# Patient Record
Sex: MALE | Race: BLACK OR AFRICAN AMERICAN | NOT HISPANIC OR LATINO | Employment: UNEMPLOYED | URBAN - METROPOLITAN AREA
[De-identification: names, ages, dates, MRNs, and addresses within clinical notes are randomized per-mention and may not be internally consistent; named-entity substitution may affect disease eponyms.]

---

## 2020-01-06 ENCOUNTER — APPOINTMENT (OUTPATIENT)
Dept: NON INVASIVE DIAGNOSTICS | Facility: HOSPITAL | Age: 55
DRG: 174 | End: 2020-01-06
Payer: COMMERCIAL

## 2020-01-06 ENCOUNTER — APPOINTMENT (EMERGENCY)
Dept: RADIOLOGY | Facility: HOSPITAL | Age: 55
DRG: 174 | End: 2020-01-06
Payer: COMMERCIAL

## 2020-01-06 ENCOUNTER — HOSPITAL ENCOUNTER (INPATIENT)
Facility: HOSPITAL | Age: 55
LOS: 1 days | Discharge: HOME | DRG: 174 | End: 2020-01-07
Attending: INTERNAL MEDICINE | Admitting: INTERNAL MEDICINE
Payer: COMMERCIAL

## 2020-01-06 DIAGNOSIS — I21.02 ACUTE ST ELEVATION MYOCARDIAL INFARCTION (STEMI) INVOLVING LEFT ANTERIOR DESCENDING (LAD) CORONARY ARTERY (HCC): Primary | ICD-10-CM

## 2020-01-06 DIAGNOSIS — I21.3 STEMI (ST ELEVATION MYOCARDIAL INFARCTION) (HCC): ICD-10-CM

## 2020-01-06 PROBLEM — E11.9 DIABETES (HCC): Status: ACTIVE | Noted: 2020-01-06

## 2020-01-06 LAB
ALBUMIN SERPL BCP-MCNC: 4.6 G/DL (ref 3.5–5)
ALP SERPL-CCNC: 82 U/L (ref 46–116)
ALT SERPL W P-5'-P-CCNC: 41 U/L (ref 12–78)
ANION GAP BLD CALC-SCNC: 16 MMOL/L (ref 4–13)
ANION GAP SERPL CALCULATED.3IONS-SCNC: 11 MMOL/L (ref 4–13)
APTT PPP: 125 SECONDS (ref 23–37)
AST SERPL W P-5'-P-CCNC: 36 U/L (ref 5–45)
ATRIAL RATE: 72 BPM
ATRIAL RATE: 78 BPM
ATRIAL RATE: 88 BPM
BASOPHILS # BLD AUTO: 0.05 THOUSANDS/ΜL (ref 0–0.1)
BASOPHILS NFR BLD AUTO: 1 % (ref 0–1)
BILIRUB SERPL-MCNC: 0.42 MG/DL (ref 0.2–1)
BUN BLD-MCNC: 21 MG/DL (ref 5–25)
BUN SERPL-MCNC: 20 MG/DL (ref 5–25)
CA-I BLD-SCNC: 1.08 MMOL/L (ref 1.12–1.32)
CALCIUM SERPL-MCNC: 9.4 MG/DL (ref 8.3–10.1)
CHLORIDE BLD-SCNC: 105 MMOL/L (ref 100–108)
CHLORIDE SERPL-SCNC: 103 MMOL/L (ref 100–108)
CO2 SERPL-SCNC: 28 MMOL/L (ref 21–32)
CREAT BLD-MCNC: 1.2 MG/DL (ref 0.6–1.3)
CREAT SERPL-MCNC: 1.33 MG/DL (ref 0.6–1.3)
EOSINOPHIL # BLD AUTO: 0.04 THOUSAND/ΜL (ref 0–0.61)
EOSINOPHIL NFR BLD AUTO: 0 % (ref 0–6)
ERYTHROCYTE [DISTWIDTH] IN BLOOD BY AUTOMATED COUNT: 14.4 % (ref 11.6–15.1)
GFR SERPL CREATININE-BSD FRML MDRD: 69 ML/MIN/1.73SQ M
GFR SERPL CREATININE-BSD FRML MDRD: 70 ML/MIN/1.73SQ M
GLUCOSE SERPL-MCNC: 101 MG/DL (ref 65–140)
GLUCOSE SERPL-MCNC: 105 MG/DL (ref 65–140)
GLUCOSE SERPL-MCNC: 74 MG/DL (ref 65–140)
GLUCOSE SERPL-MCNC: 75 MG/DL (ref 65–140)
GLUCOSE SERPL-MCNC: 79 MG/DL (ref 65–140)
GLUCOSE SERPL-MCNC: 91 MG/DL (ref 65–140)
HCT VFR BLD AUTO: 47.6 % (ref 36.5–49.3)
HCT VFR BLD CALC: 48 % (ref 36.5–49.3)
HGB BLD-MCNC: 15.6 G/DL (ref 12–17)
HGB BLDA-MCNC: 16.3 G/DL (ref 12–17)
IMM GRANULOCYTES # BLD AUTO: 0.04 THOUSAND/UL (ref 0–0.2)
IMM GRANULOCYTES NFR BLD AUTO: 0 % (ref 0–2)
INR PPP: 1.1 (ref 0.84–1.19)
KCT BLD-ACNC: 212 SEC (ref 89–137)
LYMPHOCYTES # BLD AUTO: 3.51 THOUSANDS/ΜL (ref 0.6–4.47)
LYMPHOCYTES NFR BLD AUTO: 34 % (ref 14–44)
MCH RBC QN AUTO: 29.9 PG (ref 26.8–34.3)
MCHC RBC AUTO-ENTMCNC: 32.8 G/DL (ref 31.4–37.4)
MCV RBC AUTO: 91 FL (ref 82–98)
MONOCYTES # BLD AUTO: 0.65 THOUSAND/ΜL (ref 0.17–1.22)
MONOCYTES NFR BLD AUTO: 6 % (ref 4–12)
NEUTROPHILS # BLD AUTO: 6.12 THOUSANDS/ΜL (ref 1.85–7.62)
NEUTS SEG NFR BLD AUTO: 59 % (ref 43–75)
NRBC BLD AUTO-RTO: 0 /100 WBCS
P AXIS: 71 DEGREES
P AXIS: 77 DEGREES
P AXIS: 79 DEGREES
PCO2 BLD: 26 MMOL/L (ref 21–32)
PLATELET # BLD AUTO: 202 THOUSANDS/UL (ref 149–390)
PMV BLD AUTO: 11.3 FL (ref 8.9–12.7)
POTASSIUM BLD-SCNC: 4.2 MMOL/L (ref 3.5–5.3)
POTASSIUM SERPL-SCNC: 4.1 MMOL/L (ref 3.5–5.3)
PR INTERVAL: 146 MS
PR INTERVAL: 148 MS
PR INTERVAL: 162 MS
PROT SERPL-MCNC: 8.1 G/DL (ref 6.4–8.2)
PROTHROMBIN TIME: 13.6 SECONDS (ref 11.6–14.5)
QRS AXIS: 57 DEGREES
QRS AXIS: 61 DEGREES
QRS AXIS: 73 DEGREES
QRSD INTERVAL: 74 MS
QRSD INTERVAL: 76 MS
QRSD INTERVAL: 82 MS
QT INTERVAL: 354 MS
QT INTERVAL: 360 MS
QT INTERVAL: 364 MS
QTC INTERVAL: 398 MS
QTC INTERVAL: 403 MS
QTC INTERVAL: 435 MS
RBC # BLD AUTO: 5.22 MILLION/UL (ref 3.88–5.62)
SODIUM BLD-SCNC: 142 MMOL/L (ref 136–145)
SODIUM SERPL-SCNC: 142 MMOL/L (ref 136–145)
SPECIMEN SOURCE: ABNORMAL
SPECIMEN SOURCE: ABNORMAL
SPECIMEN SOURCE: NORMAL
T WAVE AXIS: 32 DEGREES
T WAVE AXIS: 36 DEGREES
T WAVE AXIS: 39 DEGREES
TROPONIN I BLD-MCNC: 0.07 NG/ML (ref 0–0.08)
TROPONIN I SERPL-MCNC: 0.12 NG/ML
VENTRICULAR RATE: 72 BPM
VENTRICULAR RATE: 78 BPM
VENTRICULAR RATE: 88 BPM
WBC # BLD AUTO: 10.41 THOUSAND/UL (ref 4.31–10.16)

## 2020-01-06 PROCEDURE — C1887 CATHETER, GUIDING: HCPCS | Performed by: NURSE PRACTITIONER

## 2020-01-06 PROCEDURE — 36415 COLL VENOUS BLD VENIPUNCTURE: CPT | Performed by: EMERGENCY MEDICINE

## 2020-01-06 PROCEDURE — 93010 ELECTROCARDIOGRAM REPORT: CPT | Performed by: INTERNAL MEDICINE

## 2020-01-06 PROCEDURE — 99152 MOD SED SAME PHYS/QHP 5/>YRS: CPT | Performed by: NURSE PRACTITIONER

## 2020-01-06 PROCEDURE — C1894 INTRO/SHEATH, NON-LASER: HCPCS | Performed by: NURSE PRACTITIONER

## 2020-01-06 PROCEDURE — 93005 ELECTROCARDIOGRAM TRACING: CPT

## 2020-01-06 PROCEDURE — 84484 ASSAY OF TROPONIN QUANT: CPT | Performed by: EMERGENCY MEDICINE

## 2020-01-06 PROCEDURE — 85610 PROTHROMBIN TIME: CPT | Performed by: EMERGENCY MEDICINE

## 2020-01-06 PROCEDURE — 92941 PRQ TRLML REVSC TOT OCCL AMI: CPT | Performed by: INTERNAL MEDICINE

## 2020-01-06 PROCEDURE — C1769 GUIDE WIRE: HCPCS | Performed by: NURSE PRACTITIONER

## 2020-01-06 PROCEDURE — 027035Z DILATION OF CORONARY ARTERY, ONE ARTERY WITH TWO DRUG-ELUTING INTRALUMINAL DEVICES, PERCUTANEOUS APPROACH: ICD-10-PCS | Performed by: INTERNAL MEDICINE

## 2020-01-06 PROCEDURE — 84484 ASSAY OF TROPONIN QUANT: CPT

## 2020-01-06 PROCEDURE — 80053 COMPREHEN METABOLIC PANEL: CPT | Performed by: EMERGENCY MEDICINE

## 2020-01-06 PROCEDURE — 99153 MOD SED SAME PHYS/QHP EA: CPT | Performed by: NURSE PRACTITIONER

## 2020-01-06 PROCEDURE — 85025 COMPLETE CBC W/AUTO DIFF WBC: CPT | Performed by: EMERGENCY MEDICINE

## 2020-01-06 PROCEDURE — 99152 MOD SED SAME PHYS/QHP 5/>YRS: CPT | Performed by: INTERNAL MEDICINE

## 2020-01-06 PROCEDURE — B240ZZ3 ULTRASONOGRAPHY OF SINGLE CORONARY ARTERY, INTRAVASCULAR: ICD-10-PCS | Performed by: INTERNAL MEDICINE

## 2020-01-06 PROCEDURE — 4A023N7 MEASUREMENT OF CARDIAC SAMPLING AND PRESSURE, LEFT HEART, PERCUTANEOUS APPROACH: ICD-10-PCS | Performed by: INTERNAL MEDICINE

## 2020-01-06 PROCEDURE — 80047 BASIC METABLC PNL IONIZED CA: CPT

## 2020-01-06 PROCEDURE — NC001 PR NO CHARGE: Performed by: INTERNAL MEDICINE

## 2020-01-06 PROCEDURE — 85014 HEMATOCRIT: CPT

## 2020-01-06 PROCEDURE — 93306 TTE W/DOPPLER COMPLETE: CPT | Performed by: INTERNAL MEDICINE

## 2020-01-06 PROCEDURE — 93458 L HRT ARTERY/VENTRICLE ANGIO: CPT | Performed by: INTERNAL MEDICINE

## 2020-01-06 PROCEDURE — 99285 EMERGENCY DEPT VISIT HI MDM: CPT | Performed by: EMERGENCY MEDICINE

## 2020-01-06 PROCEDURE — B2111ZZ FLUOROSCOPY OF MULTIPLE CORONARY ARTERIES USING LOW OSMOLAR CONTRAST: ICD-10-PCS | Performed by: INTERNAL MEDICINE

## 2020-01-06 PROCEDURE — 92978 ENDOLUMINL IVUS OCT C 1ST: CPT | Performed by: INTERNAL MEDICINE

## 2020-01-06 PROCEDURE — 93458 L HRT ARTERY/VENTRICLE ANGIO: CPT | Performed by: NURSE PRACTITIONER

## 2020-01-06 PROCEDURE — 96374 THER/PROPH/DIAG INJ IV PUSH: CPT

## 2020-01-06 PROCEDURE — 85347 COAGULATION TIME ACTIVATED: CPT

## 2020-01-06 PROCEDURE — 85730 THROMBOPLASTIN TIME PARTIAL: CPT | Performed by: EMERGENCY MEDICINE

## 2020-01-06 PROCEDURE — C1874 STENT, COATED/COV W/DEL SYS: HCPCS

## 2020-01-06 PROCEDURE — 93306 TTE W/DOPPLER COMPLETE: CPT

## 2020-01-06 PROCEDURE — 82948 REAGENT STRIP/BLOOD GLUCOSE: CPT

## 2020-01-06 PROCEDURE — C1725 CATH, TRANSLUMIN NON-LASER: HCPCS | Performed by: NURSE PRACTITIONER

## 2020-01-06 PROCEDURE — 96375 TX/PRO/DX INJ NEW DRUG ADDON: CPT

## 2020-01-06 PROCEDURE — C9606 PERC D-E COR REVASC W AMI S: HCPCS | Performed by: NURSE PRACTITIONER

## 2020-01-06 PROCEDURE — C1753 CATH, INTRAVAS ULTRASOUND: HCPCS | Performed by: NURSE PRACTITIONER

## 2020-01-06 PROCEDURE — 92978 ENDOLUMINL IVUS OCT C 1ST: CPT | Performed by: NURSE PRACTITIONER

## 2020-01-06 PROCEDURE — 99285 EMERGENCY DEPT VISIT HI MDM: CPT

## 2020-01-06 PROCEDURE — 71045 X-RAY EXAM CHEST 1 VIEW: CPT

## 2020-01-06 RX ORDER — MIDAZOLAM HYDROCHLORIDE 2 MG/2ML
INJECTION, SOLUTION INTRAMUSCULAR; INTRAVENOUS CODE/TRAUMA/SEDATION MEDICATION
Status: COMPLETED | OUTPATIENT
Start: 2020-01-06 | End: 2020-01-06

## 2020-01-06 RX ORDER — ATORVASTATIN CALCIUM 40 MG/1
80 TABLET, FILM COATED ORAL
Status: DISCONTINUED | OUTPATIENT
Start: 2020-01-06 | End: 2020-01-07 | Stop reason: HOSPADM

## 2020-01-06 RX ORDER — VERAPAMIL HCL 2.5 MG/ML
AMPUL (ML) INTRAVENOUS CODE/TRAUMA/SEDATION MEDICATION
Status: COMPLETED | OUTPATIENT
Start: 2020-01-06 | End: 2020-01-06

## 2020-01-06 RX ORDER — ASPIRIN 81 MG/1
81 TABLET, CHEWABLE ORAL DAILY
Status: DISCONTINUED | OUTPATIENT
Start: 2020-01-07 | End: 2020-01-07 | Stop reason: HOSPADM

## 2020-01-06 RX ORDER — ONDANSETRON 2 MG/ML
4 INJECTION INTRAMUSCULAR; INTRAVENOUS EVERY 6 HOURS PRN
Status: DISCONTINUED | OUTPATIENT
Start: 2020-01-06 | End: 2020-01-07 | Stop reason: HOSPADM

## 2020-01-06 RX ORDER — ACETAMINOPHEN 325 MG/1
650 TABLET ORAL EVERY 4 HOURS PRN
Status: DISCONTINUED | OUTPATIENT
Start: 2020-01-06 | End: 2020-01-07 | Stop reason: HOSPADM

## 2020-01-06 RX ORDER — NITROGLYCERIN 20 MG/100ML
INJECTION INTRAVENOUS CODE/TRAUMA/SEDATION MEDICATION
Status: COMPLETED | OUTPATIENT
Start: 2020-01-06 | End: 2020-01-06

## 2020-01-06 RX ORDER — NITROGLYCERIN 0.4 MG/1
0.4 TABLET SUBLINGUAL
Status: DISCONTINUED | OUTPATIENT
Start: 2020-01-06 | End: 2020-01-07 | Stop reason: HOSPADM

## 2020-01-06 RX ORDER — FENTANYL CITRATE 50 UG/ML
50 INJECTION, SOLUTION INTRAMUSCULAR; INTRAVENOUS ONCE
Status: COMPLETED | OUTPATIENT
Start: 2020-01-06 | End: 2020-01-06

## 2020-01-06 RX ORDER — FUROSEMIDE 10 MG/ML
INJECTION INTRAMUSCULAR; INTRAVENOUS CODE/TRAUMA/SEDATION MEDICATION
Status: COMPLETED | OUTPATIENT
Start: 2020-01-06 | End: 2020-01-06

## 2020-01-06 RX ORDER — FENTANYL CITRATE 50 UG/ML
INJECTION, SOLUTION INTRAMUSCULAR; INTRAVENOUS CODE/TRAUMA/SEDATION MEDICATION
Status: COMPLETED | OUTPATIENT
Start: 2020-01-06 | End: 2020-01-06

## 2020-01-06 RX ORDER — INSULIN ASPART 100 [IU]/ML
20 INJECTION, SUSPENSION SUBCUTANEOUS
Status: DISCONTINUED | OUTPATIENT
Start: 2020-01-06 | End: 2020-01-07 | Stop reason: HOSPADM

## 2020-01-06 RX ORDER — LIDOCAINE HYDROCHLORIDE 10 MG/ML
INJECTION, SOLUTION EPIDURAL; INFILTRATION; INTRACAUDAL; PERINEURAL CODE/TRAUMA/SEDATION MEDICATION
Status: COMPLETED | OUTPATIENT
Start: 2020-01-06 | End: 2020-01-06

## 2020-01-06 RX ORDER — HEPARIN SODIUM 5000 [USP'U]/ML
5000 INJECTION, SOLUTION INTRAVENOUS; SUBCUTANEOUS EVERY 8 HOURS SCHEDULED
Status: DISCONTINUED | OUTPATIENT
Start: 2020-01-06 | End: 2020-01-07 | Stop reason: HOSPADM

## 2020-01-06 RX ORDER — HEPARIN SODIUM 1000 [USP'U]/ML
INJECTION, SOLUTION INTRAVENOUS; SUBCUTANEOUS CODE/TRAUMA/SEDATION MEDICATION
Status: COMPLETED | OUTPATIENT
Start: 2020-01-06 | End: 2020-01-06

## 2020-01-06 RX ORDER — HEPARIN SODIUM 1000 [USP'U]/ML
4000 INJECTION, SOLUTION INTRAVENOUS; SUBCUTANEOUS ONCE
Status: COMPLETED | OUTPATIENT
Start: 2020-01-06 | End: 2020-01-06

## 2020-01-06 RX ORDER — SODIUM CHLORIDE 9 MG/ML
75 INJECTION, SOLUTION INTRAVENOUS CONTINUOUS
Status: DISPENSED | OUTPATIENT
Start: 2020-01-06 | End: 2020-01-06

## 2020-01-06 RX ADMIN — ATORVASTATIN CALCIUM 80 MG: 40 TABLET, FILM COATED ORAL at 16:31

## 2020-01-06 RX ADMIN — FENTANYL CITRATE 50 MCG: 50 INJECTION, SOLUTION INTRAMUSCULAR; INTRAVENOUS at 11:20

## 2020-01-06 RX ADMIN — HEPARIN SODIUM 4000 UNITS: 1000 INJECTION INTRAVENOUS; SUBCUTANEOUS at 11:43

## 2020-01-06 RX ADMIN — HEPARIN SODIUM 5000 UNITS: 5000 INJECTION INTRAVENOUS; SUBCUTANEOUS at 21:08

## 2020-01-06 RX ADMIN — INSULIN ASPART 20 UNITS: 100 INJECTION, SUSPENSION SUBCUTANEOUS at 15:26

## 2020-01-06 RX ADMIN — LIDOCAINE HYDROCHLORIDE 0.1 ML: 10 INJECTION, SOLUTION EPIDURAL; INFILTRATION; INTRACAUDAL; PERINEURAL at 11:40

## 2020-01-06 RX ADMIN — VERAPAMIL HYDROCHLORIDE 1.25 MG: 2.5 INJECTION, SOLUTION INTRAVENOUS at 11:43

## 2020-01-06 RX ADMIN — HEPARIN SODIUM 5000 UNITS: 5000 INJECTION INTRAVENOUS; SUBCUTANEOUS at 15:26

## 2020-01-06 RX ADMIN — FUROSEMIDE 20 MG: 10 INJECTION, SOLUTION INTRAMUSCULAR; INTRAVENOUS at 12:41

## 2020-01-06 RX ADMIN — NITROGLYCERIN 200 MCG: 20 INJECTION INTRAVENOUS at 11:43

## 2020-01-06 RX ADMIN — FENTANYL CITRATE 25 MCG: 50 INJECTION, SOLUTION INTRAMUSCULAR; INTRAVENOUS at 12:11

## 2020-01-06 RX ADMIN — MIDAZOLAM HYDROCHLORIDE 1 MG: 1 INJECTION, SOLUTION INTRAMUSCULAR; INTRAVENOUS at 12:00

## 2020-01-06 RX ADMIN — HEPARIN SODIUM 4000 UNITS: 1000 INJECTION INTRAVENOUS; SUBCUTANEOUS at 11:08

## 2020-01-06 RX ADMIN — IOHEXOL 170 ML: 350 INJECTION, SOLUTION INTRAVENOUS at 12:29

## 2020-01-06 RX ADMIN — MIDAZOLAM HYDROCHLORIDE 1 MG: 1 INJECTION, SOLUTION INTRAMUSCULAR; INTRAVENOUS at 12:11

## 2020-01-06 RX ADMIN — MIDAZOLAM HYDROCHLORIDE 2 MG: 1 INJECTION, SOLUTION INTRAMUSCULAR; INTRAVENOUS at 11:36

## 2020-01-06 RX ADMIN — METOPROLOL TARTRATE 25 MG: 25 TABLET ORAL at 21:09

## 2020-01-06 RX ADMIN — FENTANYL CITRATE 50 MCG: 50 INJECTION, SOLUTION INTRAMUSCULAR; INTRAVENOUS at 11:09

## 2020-01-06 RX ADMIN — SODIUM CHLORIDE 75 ML/HR: 0.9 INJECTION, SOLUTION INTRAVENOUS at 13:02

## 2020-01-06 RX ADMIN — HEPARIN SODIUM 4000 UNITS: 1000 INJECTION INTRAVENOUS; SUBCUTANEOUS at 11:55

## 2020-01-06 RX ADMIN — FENTANYL CITRATE 25 MCG: 50 INJECTION, SOLUTION INTRAMUSCULAR; INTRAVENOUS at 11:59

## 2020-01-06 RX ADMIN — FENTANYL CITRATE 50 MCG: 50 INJECTION, SOLUTION INTRAMUSCULAR; INTRAVENOUS at 11:36

## 2020-01-06 RX ADMIN — TICAGRELOR 90 MG: 90 TABLET ORAL at 22:36

## 2020-01-06 RX ADMIN — TICAGRELOR 180 MG: 90 TABLET ORAL at 11:06

## 2020-01-06 NOTE — PLAN OF CARE
Problem: Potential for Falls  Goal: Patient will remain free of falls  Description  INTERVENTIONS:  - Assess patient frequently for physical needs  -  Identify cognitive and physical deficits and behaviors that affect risk of falls    -  [place] fall precautions as indicated by assessment   - Educate patient/family on patient safety including physical limitations  - Instruct patient to call for assistance with activity based on assessment  - Modify environment to reduce risk of injury  - Consider OT/PT consult to assist with strengthening/mobility  Outcome: Progressing     Problem: PAIN - ADULT  Goal: Verbalizes/displays adequate comfort level or baseline comfort level  Description  Interventions:  - Encourage patient to monitor pain and request assistance  - Assess pain using appropriate pain scale  - Administer analgesics based on type and severity of pain and evaluate response  - Implement non-pharmacological measures as appropriate and evaluate response  - Consider cultural and social influences on pain and pain management  - Notify physician/advanced practitioner if interventions unsuccessful or patient reports new pain  Outcome: Progressing

## 2020-01-06 NOTE — SOCIAL WORK
CM was notified by cardiology team that they had escribed Brilinta to [address] to verify coverage and cost     CM received a call from Carolyn Conti who reported that patient will have a $0 co-payment monthly going forward  CM updated Cardiology team and will continue to follow for any additional discharge needs

## 2020-01-06 NOTE — ED PROVIDER NOTES
History  Chief Complaint   Patient presents with    Chest Pain     Patient was at gym, returned home when sudden onset of "crushing chest pain" began       History provided by:  Patient   used: No      Patient is a 41-year-old male presenting to emergency department with chest pain  Started before arrival   Patient went to the gym today morning  Was feeling fine afterwards  Went home  ate breakfast   While at rest start having chest pain  Pressure and squeeze  Called an ambulance  EMS gave aspirin 324  Pre-hospital STEMI alert called based on EMS ECG  Patient was diaphoretic  Heart rate would become bradycardic  No fevers  No vomiting  No tenderness  No history of MI or stents  Diabetic  Smoker  No drug use except for marijuana  No alcohol use  No hypertension hyperlipidemia  EMS ECG consistent with MI with elevations V1 V2 V3 and depressions inferiorly  I did speak with Dr Julito Juan from Interventional Cardiology, he is on his way in to the hospital     Patient given Brilinta, heparin bolus, fentanyl  Repeat fentanyl for pain  ECG in the ER also consistent with STEMI, elevations eval V1 V2 V3, depressions inferiorly  With patient becoming bradycardic, concern for right-sided involvement as well, nitro helped  Prior to Admission Medications   Prescriptions Last Dose Informant Patient Reported? Taking?   insulin aspart protamine-insulin aspart (NovoLOG 70/30) 100 Units/mL injection pen   Yes Yes   Sig: Inject 20 Units under the skin 2 (two) times a day with meals   metFORMIN (GLUCOPHAGE) 1000 MG tablet   Yes Yes   Sig: Take 1,000 mg by mouth 2 (two) times a day with meals      Facility-Administered Medications: None       Past Medical History:   Diagnosis Date    Diabetes mellitus ([facility] [address] )        History reviewed  No pertinent surgical history  History reviewed  No pertinent family history  I have reviewed and agree with the history as documented      Social History     Tobacco Use    Smoking status: Current Every Day Smoker     Types: Cigarettes    Smokeless tobacco: Never Used   Substance Use Topics    Alcohol use: Not Currently    Drug use: Yes     Types: Marijuana        Review of Systems   Constitutional: Positive for diaphoresis  Negative for chills and fever  HENT: Negative for congestion and sore throat  Respiratory: Negative for cough, shortness of breath, wheezing and stridor  Cardiovascular: Positive for chest pain  Negative for palpitations and leg swelling  Gastrointestinal: Negative for abdominal pain, blood in stool, diarrhea, nausea and vomiting  Genitourinary: Negative for dysuria, frequency and urgency  Musculoskeletal: Negative for neck pain and neck stiffness  Skin: Negative for pallor and rash  Neurological: Negative for dizziness, syncope, weakness, light-headedness and headaches  All other systems reviewed and are negative  Physical Exam  Physical Exam   Constitutional: He is oriented to person, place, and time  He appears well-developed and well-nourished  He appears distressed  HENT:   Head: Normocephalic and atraumatic  Eyes: Pupils are equal, round, and reactive to light  Neck: Neck supple  Cardiovascular: Normal rate, regular rhythm, normal heart sounds and intact distal pulses  Pulmonary/Chest: Effort normal and breath sounds normal  No respiratory distress  Abdominal: Soft  Bowel sounds are normal  There is no tenderness  Musculoskeletal: Normal range of motion  He exhibits no edema or deformity  Neurological: He is alert and oriented to person, place, and time  Skin: Skin is warm  Capillary refill takes less than 2 seconds  He is diaphoretic  No erythema  No pallor  Vitals reviewed        Vital Signs  ED Triage Vitals   Temperature Pulse Respirations Blood Pressure SpO2   01/06/20 1248 01/06/20 1109 01/06/20 1248 01/06/20 1105 01/06/20 1109   98 4 °F (36 9 °C) 72 16 145/92 94 %      Temp Source Heart Rate Source Patient Position - Orthostatic VS BP Location FiO2 (%)   01/06/20 1248 01/06/20 1248 01/06/20 1248 01/06/20 1248 --   Oral Monitor Lying Left arm       Pain Score       --                  Vitals:    01/06/20 1830 01/06/20 1856 01/06/20 1900 01/06/20 2000   BP: 128/58 133/63 133/63 140/67   Pulse: 65 65 65 66   Patient Position - Orthostatic VS:  Lying           Visual Acuity  Visual Acuity      Most Recent Value   L Pupil Size (mm)  2   R Pupil Size (mm)  2   L Pupil Shape  Round   R Pupil Shape  Round          ED Medications  Medications   insulin aspart protamine-insulin aspart (NovoLOG 70/30) 100 units/mL subcutaneous injection 20 Units (20 Units Subcutaneous Given 1/6/20 1526)   nicotine (NICODERM CQ) 7 mg/24hr TD 24 hr patch 1 patch (1 patch Transdermal Not Given 1/6/20 1302)   heparin (porcine) subcutaneous injection 5,000 Units (5,000 Units Subcutaneous Given 1/6/20 1526)   acetaminophen (TYLENOL) tablet 650 mg (has no administration in time range)   ondansetron (ZOFRAN) injection 4 mg (has no administration in time range)   aspirin chewable tablet 81 mg (has no administration in time range)   nitroglycerin (NITROSTAT) SL tablet 0 4 mg (has no administration in time range)   insulin lispro (HumaLOG) 100 units/mL subcutaneous injection 1-5 Units (1 Units Subcutaneous Not Given 1/6/20 1529)   insulin lispro (HumaLOG) 100 units/mL subcutaneous injection 1-5 Units (has no administration in time range)   ticagrelor (BRILINTA) tablet 90 mg (has no administration in time range)   sodium chloride 0 9 % infusion (75 mL/hr Intravenous New Bag 1/6/20 1302)   atorvastatin (LIPITOR) tablet 80 mg (80 mg Oral Given 1/6/20 1631)   metoprolol tartrate (LOPRESSOR) tablet 25 mg (has no administration in time range)   fentanyl citrate (PF) 100 MCG/2ML 50 mcg (50 mcg Intravenous Given 1/6/20 1109)   fentanyl citrate (PF) 100 MCG/2ML 50 mcg (50 mcg Intravenous Given 1/6/20 1120)   fentanyl citrate (PF) 100 MCG/2ML (50 mcg Intravenous Given 1/6/20 1136)   midazolam (VERSED) injection (2 mg Intravenous Given 1/6/20 1136)   lidocaine (PF) (XYLOCAINE-MPF) 1 % injection (0 1 mL Infiltration Given 1/6/20 1140)   nitroGLYcerin (TRIDIL) 50 mg in 250 mL infusion (premix) (200 mcg Intra-arterial Given 1/6/20 1143)   verapamil (ISOPTIN) injection (1 25 mg Intra-arterial Given 1/6/20 1143)   heparin (porcine) injection (4,000 Units Intravenous Given 1/6/20 1155)   heparin (porcine) injection 4,000 Units (4,000 Units Intravenous Given 1/6/20 1108)   ticagrelor (BRILINTA) tablet 180 mg (180 mg Oral Given 1/6/20 1106)   fentanyl citrate (PF) 100 MCG/2ML (25 mcg Intravenous Given 1/6/20 1211)   midazolam (VERSED) injection (1 mg Intravenous Given 1/6/20 1211)   iohexol (OMNIPAQUE) 350 MG/ML injection (SINGLE-DOSE) (170 mL Intravenous Given 1/6/20 1229)   furosemide (LASIX) injection (20 mg Intravenous Given 1/6/20 1241)       Diagnostic Studies  Results Reviewed     Procedure Component Value Units Date/Time    Protime-INR [[id number]]  (Normal) Collected:  01/06/20 1118    Lab Status:  Final result Specimen:  Blood from Arm, Right Updated:  01/06/20 1250     Protime 13 6 seconds      INR 1 10    APTT [[id number]]  (Abnormal) Collected:  01/06/20 1118    Lab Status:  Final result Specimen:  Blood from Arm, Right Updated:  01/06/20 1250      seconds     Narrative:       Verified by repeat analysis    POCT activated clotting time [[id number]]  (Abnormal) Collected:  01/06/20 1151    Lab Status:  Final result Specimen:  Venous Updated:  01/06/20 1157     Activated Clotting Time, i-STAT 212 sec      Specimen Type VENOUS    Troponin I [[id number]]  (Abnormal) Collected:  01/06/20 1118    Lab Status:  Final result Specimen:  Blood from Arm, Right Updated:  01/06/20 1147     Troponin I 0 12 ng/mL     Comprehensive metabolic panel [[id number]]  (Abnormal) Collected:  01/06/20 1118    Lab Status:  Final result Specimen:  Blood from Arm, Right Updated:  01/06/20 1144     Sodium 142 mmol/L      Potassium 4 1 mmol/L      Chloride 103 mmol/L      CO2 28 mmol/L      ANION GAP 11 mmol/L      BUN 20 mg/dL      Creatinine 1 33 mg/dL      Glucose 105 mg/dL      Calcium 9 4 mg/dL      AST 36 U/L      ALT 41 U/L      Alkaline Phosphatase 82 U/L      Total Protein 8 1 g/dL      Albumin 4 6 g/dL      Total Bilirubin 0 42 mg/dL      eGFR 70 ml/min/1 73sq m     Narrative:       National Kidney Disease Foundation guidelines for Chronic Kidney Disease (CKD):     Stage 1 with normal or high GFR (GFR > 90 mL/min/1 73 square meters)    Stage 2 Mild CKD (GFR = 60-89 mL/min/1 73 square meters)    Stage 3A Moderate CKD (GFR = 45-59 mL/min/1 73 square meters)    Stage 3B Moderate CKD (GFR = 30-44 mL/min/1 73 square meters)    Stage 4 Severe CKD (GFR = 15-29 mL/min/1 73 square meters)    Stage 5 End Stage CKD (GFR <15 mL/min/1 73 square meters)  Note: GFR calculation is accurate only with a steady state creatinine    POCT troponin [[id number]] Collected:  01/06/20 1118    Lab Status:  Final result Specimen:  Venous Updated:  01/06/20 1131     POC Troponin I 0 07 ng/ml      Specimen Type VENOUS    Narrative:       [employer] i-Stat handheld analyzer 99% cutoff is > 0 08ng/mL in network Emergency Departments    o cTnI 99% cutoff is useful only when applied to patients in the clinical setting of myocardial ischemia  o cTnI 99% cutoff should be interpreted in the context of clinical history, ECG findings and possibly cardiac imaging to establish correct diagnosis  o cTnI 99% cutoff may be suggestive but clearly not indicative of a coronary event without the clinical setting of myocardial ischemia        CBC and differential [[id number]]  (Abnormal) Collected:  01/06/20 1118    Lab Status:  Final result Specimen:  Blood from Arm, Right Updated:  01/06/20 1127     WBC 10 41 Thousand/uL      RBC 5 22 Million/uL      Hemoglobin 15 6 g/dL      Hematocrit 47 6 %      MCV 91 fL MCH 29 9 pg      MCHC 32 8 g/dL      RDW 14 4 %      MPV 11 3 fL      Platelets 180 Thousands/uL      nRBC 0 /100 WBCs      Neutrophils Relative 59 %      Immat GRANS % 0 %      Lymphocytes Relative 34 %      Monocytes Relative 6 %      Eosinophils Relative 0 %      Basophils Relative 1 %      Neutrophils Absolute 6 12 Thousands/µL      Immature Grans Absolute 0 04 Thousand/uL      Lymphocytes Absolute 3 51 Thousands/µL      Monocytes Absolute 0 65 Thousand/µL      Eosinophils Absolute 0 04 Thousand/µL      Basophils Absolute 0 05 Thousands/µL     POCT Chem 8+ [[id number]]  (Abnormal) Collected:  01/06/20 1120    Lab Status:  Final result Specimen:  Venous Updated:  01/06/20 1125     SODIUM, I-STAT 142 mmol/l      Potassium, i-STAT 4 2 mmol/L      Chloride, istat 105 mmol/L      CO2, i-STAT 26 mmol/L      Anion Gap, i-STAT 16 mmol/L      Calcium, Ionized i-STAT 1 08 mmol/L      BUN, I-STAT 21 mg/dl      Creatinine, i-STAT 1 2 mg/dl      eGFR 69 ml/min/1 73sq m      Glucose, i-STAT 101 mg/dl      Hct, i-STAT 48 %      Hgb, i-STAT 16 3 g/dl      Specimen Type VENOUS    Narrative:       National Kidney Disease Foundation guidelines for Chronic Kidney Disease (CKD):     Stage 1 with normal or high GFR (GFR > 90 mL/min/1 73 square meters)    Stage 2 Mild CKD (GFR = 60-89 mL/min/1 73 square meters)    Stage 3A Moderate CKD (GFR = 45-59 mL/min/1 73 square meters)    Stage 3B Moderate CKD (GFR = 30-44 mL/min/1 73 square meters)    Stage 4 Severe CKD (GFR = 15-29 mL/min/1 73 square meters)    Stage 5 End Stage CKD (GFR <15 mL/min/1 73 square meters)  Note: GFR calculation is accurate only with a steady state creatinine    Fingerstick Glucose (POCT) [[id number]]  (Normal) Collected:  01/06/20 1107    Lab Status:  Final result Updated:  01/06/20 1121     POC Glucose 74 mg/dl                  XR chest 1 view portable   Final Result by Jairo Merrill MD (01/06 1417)      No acute cardiopulmonary disease  Workstation performed: [id number]                    Procedures  Procedures         ED Course                               MDM      Disposition  Final diagnoses:   STEMI (ST elevation myocardial infarction) ([facility] [address] )     Time reflects when diagnosis was documented in both MDM as applicable and the Disposition within this note     Time User Action Codes Description Comment    1/6/2020 11:22 AM Stefania Bravo [I21 3] STEMI (ST elevation myocardial infarction) [facility])       ED Disposition     ED Disposition Condition Date/Time Comment    Admit  Mon Jan 6, 2020 12:25 PM Admitting Physician: Radha Hanson [[id number]]   Level of Care: Critical Care [15]        Follow-up Information    None         Current Discharge Medication List      START taking these medications    Details   ticagrelor (BRILINTA) 90 MG Take 1 tablet (90 mg total) by mouth every 12 (twelve) hours  Qty: 60 tablet, Refills: 0    Comments: Price check only, please do not fill  Associated Diagnoses: [facility] infarction) [facility]         CONTINUE these medications which have NOT CHANGED    Details   insulin aspart protamine-insulin aspart (NovoLOG 70/30) 100 Units/mL injection pen Inject 20 Units under the skin 2 (two) times a day with meals      metFORMIN (GLUCOPHAGE) 1000 MG tablet Take 1,000 mg by mouth 2 (two) times a day with meals           No discharge procedures on file      ED Provider  Electronically Signed by           Melchor Correa MD  01/06/20 2042

## 2020-01-06 NOTE — H&P
History and Physical   General Cardiology  [facility] TANNA 49 y o  male MRN: [id number]  Unit/Bed#: BAIRON Encounter: [id number]    Principal Problem: Anterior STEMI      INPATIENT     HPI: [facility] TANNA is a 49y o  year old male who presents with chest pain  Patient has history of diabetes, tobacco abuse and marijuana use  He was a pre hospital MI alert  He reports he went to the gym today and had no report of chest pain at rest or with exertion  Once he came home, he reports he developed 10/10 chest pain in mid sternum with radiation to bilateral arms  He is also reporting abdominal pain  ECG showed ST elevation in anterior leads with reciprocal inferior changes  He was given aspirin en route and loaded with Brilinta 180 mg PO once and heparin 4,000 units  He reports no family history of known CAD  He does have a strong family history of diabetes  He smokes approximately a black and mild every other day and 2 blunts of marijuana a day  He denies other illicit drug use or alcohol use  Review of Systems   Constitution: Negative for chills and fever  Cardiovascular: Positive for chest pain  Respiratory: Positive for shortness of breath  Negative for cough  Musculoskeletal: Negative for falls  Gastrointestinal: Positive for abdominal pain  Negative for nausea and vomiting  Neurological: Negative for dizziness and light-headedness  Psychiatric/Behavioral: Negative for altered mental status  All other systems reviewed and are negative  Historical Information   No past medical history on file  No past surgical history on file    Social History     Substance and Sexual Activity   Alcohol Use Not on file     Social History     Substance and Sexual Activity   Drug Use Not on file     Social History     Tobacco Use   Smoking Status Not on file     Social History     Socioeconomic History    Marital status: Single     Spouse name: Not on file    Number of children: Not on file    Years of education: Not on file    Highest education level: Not on file   Occupational History    Not on file   Social Needs    Financial resource strain: Not on file    Food insecurity:     Worry: Not on file     Inability: Not on file    Transportation needs:     Medical: Not on file     Non-medical: Not on file   Tobacco Use    Smoking status: Not on file   Substance and Sexual Activity    Alcohol use: Not on file    Drug use: Not on file    Sexual activity: Not on file   Lifestyle    Physical activity:     Days per week: Not on file     Minutes per session: Not on file    Stress: Not on file   Relationships    Social connections:     Talks on phone: Not on file     Gets together: Not on file     Attends Rastafari service: Not on file     Active member of club or organization: Not on file     Attends meetings of clubs or organizations: Not on file     Relationship status: Not on file    Intimate partner violence:     Fear of current or ex partner: Not on file     Emotionally abused: Not on file     Physically abused: Not on file     Forced sexual activity: Not on file   Other Topics Concern    Not on file   Social History Narrative    Not on file       Family History:No family history on file  Meds/Allergies      Current Facility-Administered Medications   Medication Dose Route Frequency    fentanyl citrate (PF) 100 MCG/2ML 50 mcg  50 mcg Intravenous Once        None     Invasive Devices     None               Weight (last 2 days)     None          Physical Exam   Constitutional: He is oriented to person, place, and time  He appears well-developed  He appears distressed  Uncomfortable    HENT:   Head: Normocephalic  Neck: Neck supple  Cardiovascular: Normal rate, regular rhythm, normal heart sounds and intact distal pulses  Pulmonary/Chest: Effort normal and breath sounds normal  No stridor  No respiratory distress  NC   Abdominal: Soft   Bowel sounds are normal    Musculoskeletal: He exhibits no edema  Neurological: He is alert and oriented to person, place, and time  No cranial nerve deficit  Skin: Skin is warm and dry  He is not diaphoretic  Psychiatric: He has a normal mood and affect  His behavior is normal        LABORATORY RESULTS:        CBC with diff:   Results from last 7 days   Lab Units 01/06/20  1120 01/06/20  1118   WBC Thousand/uL  --  10 41*   HEMOGLOBIN g/dL  --  15 6   I STAT HEMOGLOBIN g/dl 16 3  --    HEMATOCRIT %  --  47 6   HEMATOCRIT, ISTAT % 48  --    MCV fL  --  91   PLATELETS Thousands/uL  --  202   MCH pg  --  29 9   MCHC g/dL  --  32 8   RDW %  --  14 4   MPV fL  --  11 3   NRBC AUTO /100 WBCs  --  0       CMP:  Results from last 7 days   Lab Units 01/06/20  1120   CO2, I-STAT mmol/L 26   GLUCOSE, ISTAT mg/dl 101   EGFR ml/min/1 73sq m 69       BMP:  Results from last 7 days   Lab Units 01/06/20  1120   CO2, I-STAT mmol/L 26   GLUCOSE, ISTAT mg/dl 101           Lipid Profile:   No results found for: CHOL  No results found for: HDL  No results found for: LDLCALC  No results found for: TRIG      Cardiac Testing:   No results found for this or any previous visit  No procedure found  No results found for this or any previous visit  No results found  I have personally reviewed pertinent reports  EKG: Normal sinus rhythm with ST elevation in anterior leads   Ventricular rate 72 bpm  DE interval 148 ms  QRSD 74 ms  QT interval 364 ms  QTc interval 398 ms     Assessment/Plan     1  Anterior STEMI-   Urgent cardiac cath   Patient was loaded with Brilinta 180 mg PO daily,  heparin 4000 units and received aspirin prior to hospital   Echocardiogram ordered   Further recommendation post cardiac cath     2  Diabetes- check hemoglobin A1C   Patient takes Humalog 70/30 20 units BID and metformin 1000 mg PO BID   He took prior to admission     VTE Prophylaxis: Sequential compression device (Venodyne)   Expected length of stay:   greater than 2 midnights Counseling / Coordination of Care  Total floor / unit time spent today 45 minutes  Greater than 50% of total time was spent with the patient and / or family counseling and / or coordination of care  ERIC Hager      ** Please Note: Dragon 360 Dictation voice to text software may have been used in the creation of this document   **

## 2020-01-07 VITALS
RESPIRATION RATE: 18 BRPM | BODY MASS INDEX: 30.47 KG/M2 | HEART RATE: 55 BPM | OXYGEN SATURATION: 98 % | DIASTOLIC BLOOD PRESSURE: 71 MMHG | SYSTOLIC BLOOD PRESSURE: 122 MMHG | TEMPERATURE: 98.6 F | WEIGHT: 224.65 LBS

## 2020-01-07 PROBLEM — F17.290 CIGAR SMOKER: Status: ACTIVE | Noted: 2020-01-07

## 2020-01-07 LAB
ANION GAP SERPL CALCULATED.3IONS-SCNC: 8 MMOL/L (ref 4–13)
BUN SERPL-MCNC: 20 MG/DL (ref 5–25)
CALCIUM SERPL-MCNC: 8.6 MG/DL (ref 8.3–10.1)
CHLORIDE SERPL-SCNC: 106 MMOL/L (ref 100–108)
CHOLEST SERPL-MCNC: 125 MG/DL (ref 50–200)
CO2 SERPL-SCNC: 26 MMOL/L (ref 21–32)
CREAT SERPL-MCNC: 0.94 MG/DL (ref 0.6–1.3)
ERYTHROCYTE [DISTWIDTH] IN BLOOD BY AUTOMATED COUNT: 14.4 % (ref 11.6–15.1)
EST. AVERAGE GLUCOSE BLD GHB EST-MCNC: 94 MG/DL
GFR SERPL CREATININE-BSD FRML MDRD: 107 ML/MIN/1.73SQ M
GLUCOSE SERPL-MCNC: 112 MG/DL (ref 65–140)
GLUCOSE SERPL-MCNC: 76 MG/DL (ref 65–140)
GLUCOSE SERPL-MCNC: 81 MG/DL (ref 65–140)
HBA1C MFR BLD: 4.9 % (ref 4.2–6.3)
HCT VFR BLD AUTO: 42.2 % (ref 36.5–49.3)
HDLC SERPL-MCNC: 56 MG/DL
HGB BLD-MCNC: 14 G/DL (ref 12–17)
LDLC SERPL CALC-MCNC: 61 MG/DL (ref 0–100)
MCH RBC QN AUTO: 29.9 PG (ref 26.8–34.3)
MCHC RBC AUTO-ENTMCNC: 33.2 G/DL (ref 31.4–37.4)
MCV RBC AUTO: 90 FL (ref 82–98)
PLATELET # BLD AUTO: 190 THOUSANDS/UL (ref 149–390)
PMV BLD AUTO: 11.8 FL (ref 8.9–12.7)
POTASSIUM SERPL-SCNC: 4.6 MMOL/L (ref 3.5–5.3)
RBC # BLD AUTO: 4.68 MILLION/UL (ref 3.88–5.62)
SODIUM SERPL-SCNC: 140 MMOL/L (ref 136–145)
TRIGL SERPL-MCNC: 39 MG/DL
TSH SERPL DL<=0.05 MIU/L-ACNC: 1.17 UIU/ML (ref 0.36–3.74)
WBC # BLD AUTO: 12.33 THOUSAND/UL (ref 4.31–10.16)

## 2020-01-07 PROCEDURE — 84443 ASSAY THYROID STIM HORMONE: CPT | Performed by: NURSE PRACTITIONER

## 2020-01-07 PROCEDURE — NC001 PR NO CHARGE: Performed by: INTERNAL MEDICINE

## 2020-01-07 PROCEDURE — 99238 HOSP IP/OBS DSCHRG MGMT 30/<: CPT | Performed by: INTERNAL MEDICINE

## 2020-01-07 PROCEDURE — 83036 HEMOGLOBIN GLYCOSYLATED A1C: CPT | Performed by: NURSE PRACTITIONER

## 2020-01-07 PROCEDURE — 80048 BASIC METABOLIC PNL TOTAL CA: CPT | Performed by: NURSE PRACTITIONER

## 2020-01-07 PROCEDURE — 82948 REAGENT STRIP/BLOOD GLUCOSE: CPT

## 2020-01-07 PROCEDURE — 85027 COMPLETE CBC AUTOMATED: CPT | Performed by: NURSE PRACTITIONER

## 2020-01-07 PROCEDURE — 80061 LIPID PANEL: CPT | Performed by: NURSE PRACTITIONER

## 2020-01-07 RX ORDER — ATORVASTATIN CALCIUM 80 MG/1
80 TABLET, FILM COATED ORAL
Qty: 30 TABLET | Refills: 1 | Status: SHIPPED | OUTPATIENT
Start: 2020-01-07 | End: 2020-10-27 | Stop reason: SDUPTHER

## 2020-01-07 RX ORDER — NITROGLYCERIN 0.4 MG/1
0.4 TABLET SUBLINGUAL
Qty: 90 TABLET | Refills: 0 | Status: SHIPPED | OUTPATIENT
Start: 2020-01-07

## 2020-01-07 RX ORDER — ASPIRIN 81 MG/1
81 TABLET, CHEWABLE ORAL DAILY
Qty: 30 TABLET | Refills: 11 | Status: SHIPPED | OUTPATIENT
Start: 2020-01-08 | End: 2020-10-27 | Stop reason: SDUPTHER

## 2020-01-07 RX ADMIN — METOPROLOL TARTRATE 25 MG: 25 TABLET ORAL at 08:45

## 2020-01-07 RX ADMIN — TICAGRELOR 90 MG: 90 TABLET ORAL at 12:00

## 2020-01-07 RX ADMIN — INSULIN ASPART 20 UNITS: 100 INJECTION, SUSPENSION SUBCUTANEOUS at 08:44

## 2020-01-07 RX ADMIN — ASPIRIN 81 MG 81 MG: 81 TABLET ORAL at 08:44

## 2020-01-07 RX ADMIN — HEPARIN SODIUM 5000 UNITS: 5000 INJECTION INTRAVENOUS; SUBCUTANEOUS at 06:00

## 2020-01-07 NOTE — PROGRESS NOTES
Progress Note - Cardiology   [facility] 49 y o  male MRN: [id number]  Encounter: [id number]  01/07/20  7:54 AM        Assessment/Plan:    1  Anterior STEMI s/p 2 Synergy MR HALI to proximal LAD 90% stenosis  EF preserved by echo  On aspirin, statin, beta blocker, Brilinta  Check cost of Brilinta for discharge  Referral to cardiac rehab post discharge, he is agreeable  2  DM  On insulin and Metformin PTA  Hgb A1c pending  Lipid panel 1/7/20 total 125, TG 39, HDL 56, LDL 61      3  Tobacco use  Ordered for nicotine patch  Reports he only smokes cigars every other day to help increase the effects of marijuana that he smokes  Agreeable to stopping smoking cigars, wishes to continue marijuana use  Stable for discharge later today, pending CM assessment  Subjective/Objective   Chief Complaint:   Chief Complaint   Patient presents with    Chest Pain     Patient was at gym, returned home when sudden onset of "crushing chest pain" began         Subjective: 49 y o  with history of DM, tobacco use, admitted with chest pain/abdominal pain with radiation to bilateral arms, EKG showed anterior STEMI, taken urgently to cardiac catheterization 1/6/20 which showed 90% proximal LAD stenosis, treated with 2 Synergy MR HALI  EDP was elevated at 20, increasing to 33 mm Hg after atrial systole  Echo 1/6/20 showed normal LV size and function, possible mild anteroseptal hypokinesis, LVH, normal RV size and function, PASP 35-40 mm Hg  No current CP or SOB  No LE edema  Had some oozing/bleeding from right radial cath site, currently has pressure dressing in place  No palpitations  No fevers  No dizziness or lightheadedness         Patient Active Problem List   Diagnosis    Acute ST elevation myocardial infarction (STEMI) involving left anterior descending (LAD) coronary artery (HCC)    Diabetes ([facility] [address] )     Past Medical History:   Diagnosis Date    [facility]        No Known Allergies    Current Facility-Administered Medications   Medication Dose Route Frequency Provider Last Rate Last Dose    acetaminophen (TYLENOL) tablet 650 mg  650 mg Oral Q4H PRN ERIC Sneed        aspirin chewable tablet 81 mg  81 mg Oral Daily ERIC Sneed        atorvastatin (LIPITOR) tablet 80 mg  80 mg Oral Daily [email or web address]   80 mg at 01/06/20 1631    heparin (porcine) subcutaneous injection 5,000 Units  5,000 Units Subcutaneous [facility] ERIC Sneed   5,000 Units at 01/07/20 0600    insulin aspart protamine-insulin aspart (NovoLOG 70/30) 100 units/mL subcutaneous injection 20 Units  20 Units Subcutaneous BID AC ERIC Sneed   20 Units at 01/06/20 1526    insulin lispro (HumaLOG) 100 units/mL subcutaneous injection 1-5 Units  1-5 Units Subcutaneous TID AC ERIC Jenkins        insulin lispro (HumaLOG) 100 units/mL subcutaneous injection 1-5 Units  1-5 Units Subcutaneous HS ERIC Sneed        metoprolol tartrate (LOPRESSOR) tablet 25 mg  25 mg Oral Q12H [address] ERIC Sneed   25 mg at 01/06/20 2109    nicotine (NICODERM CQ) 7 mg/24hr TD 24 hr patch 1 patch  1 patch Transdermal Daily ERIC Sneed        nitroglycerin (NITROSTAT) SL tablet 0 4 mg  0 4 mg Sublingual Q5 Min PRN ERIC Sneed        ondansetron [facility]) injection 4 mg  4 mg Intravenous Q6H PRN ERIC Sneed        ticagror [facility]) tablet 90 mg  90 mg Oral Q12H [address] ERIC Sneed   90 mg at 01/06/20 2236       Vitals: /89   Pulse 60   Temp 98 4 °F (36 9 °C) (Oral)   Resp 16   Wt 102 kg (224 lb 10 4 oz)   SpO2 99%   BMI 30 47 kg/m²     Intake/Output Summary (Last 24 hours) at 1/7/2020 0754  Last data filed at 1/7/2020 0603  Gross per 24 hour   Intake 2026 25 ml   Output 1850 ml   Net 176 25 ml     Wt Readings from Last 3 Encounters:   01/06/20 102 kg (224 lb 10 4 oz)   05/20/15 112 kg (246 lb 6 oz)   04/20/15 110 kg (243 lb 6 2 oz)       Body mass index is 30 47 kg/m²  ,     Vitals:    01/07/20 0400 01/07/20 0500 01/07/20 0600 01/07/20 0700   BP: 118/65 119/64 117/62 129/89   Pulse: 56 58 59 60   Patient Position - Orthostatic VS:           Physical Exam:     GEN: Alert and oriented x 3, in no acute distress  HEENT: Sclera anicteric, conjunctivae pink, mucous membranes moist   NECK: Supple, no carotid bruits, no significant JVD  HEART: Regular rhythm, normal S1 and S2, no murmurs, clicks, gallops or rubs  LUNGS: Clear to auscultation bilaterally; no wheezes, rales, or rhonchi   ABDOMEN: Soft, nontender, nondistended, normoactive bowel sounds  EXTREMITIES: Skin warm and well perfused, no clubbing, cyanosis, or edema  Pressure dressing over right radial cath site  NEURO: No focal findings  SKIN: Normal without suspicious lesions on exposed skin        Lab Results:     BMP:  Results from last 7 days   Lab Units 01/07/20  0433 01/06/20  1120 01/06/20  1118   POTASSIUM mmol/L 4 6  --  4 1   CHLORIDE mmol/L 106  --  103   CO2 mmol/L 26  --  28   CO2, I-STAT mmol/L  --  26  --    BUN mg/dL 20  --  20   CREATININE mg/dL 0 94  --  1 33*   GLUCOSE, ISTAT mg/dl  --  101  --    CALCIUM mg/dL 8 6  --  9 4       CBC:   Results from last 7 days   Lab Units 01/07/20  0433 01/06/20  1120 01/06/20  1118   WBC Thousand/uL 12 33*  --  10 41*   HEMOGLOBIN g/dL 14 0  --  15 6   I STAT HEMOGLOBIN g/dl  --  16 3  --    HEMATOCRIT % 42 2  --  47 6   HEMATOCRIT, ISTAT %  --  48  --    MCV fL 90  --  91   PLATELETS Thousands/uL 190  --  202   MCH pg 29 9  --  29 9   MCHC g/dL 33 2  --  32 8   RDW % 14 4  --  14 4   MPV fL 11 8  --  11 3   NRBC AUTO /100 WBCs  --   --  0       Results from last 7 days   Lab Units 01/06/20  1118   TROPONIN I ng/mL 0 12*     INR:   Results from last 7 days   Lab Units 01/06/20  1118   INR  1 10     Lipid Profile:   No results found for: CHOL  Lab Results   Component Value Date    HDL 56 01/07/2020     Lab Results   Component Value Date    LDLCALC 61 01/07/2020     Lab Results   Component Value Date    TRIG 39 01/07/2020         Hgb A1c:         Telemetry: personally reviewed, SR, SB to 47 BPM, 6 beats NSVT

## 2020-01-07 NOTE — UTILIZATION REVIEW
Initial Clinical Review    Admission: Date/Time/Statement: Inpatient Admission Orders (From admission, onward)     Ordered        01/06/20 1252  Inpatient Admission  Once                   Orders Placed This Encounter   Procedures    Inpatient Admission     Standing Status:   Standing     Number of Occurrences:   1     Order Specific Question:   Admitting Physician     Answer:   Porsche Serna [[id number]]     Order Specific Question:   Level of Care     Answer:   Critical Care [15]     Order Specific Question:   Estimated length of stay     Answer:   More than 2 Midnights     Order Specific Question:   Certification     Answer:   I certify that inpatient services are medically necessary for this patient for a duration of greater than two midnights  See H&P and MD Progress Notes for additional information about the patient's course of treatment  ED Arrival Information     Expected Arrival Acuity Means of Arrival Escorted By Service Admission Type    - 1/6/2020 11:03 Emergent Ambulance [facility] Cardiology Emergency    Arrival Complaint    STEMI alert        Chief Complaint   Patient presents with    Chest Pain     Patient was at gym, returned home when sudden onset of "crushing chest pain" began     Assessment/Plan: 50 yo male to ED by EMS admitted as Inpatient due to STEMI  Patient went to gym in am, feeling baseline after workout  He went home & ate breakfast then began to have chest pain feeling pressure & squeezing in nature  He notified EMS  He was a pre hospital "STEMI" alert based on EMS ECG  Patient symptoms included diaphoresis with HR becoming bradycardiac  Nitro given in EMS with some relief  In ED: EKG:  EKG: Normal sinus rhythm with ST elevation in anterior leads  Labs: elevated TROP & NADEEM  MD exam with patient as distressed, diaphoretic  Received IV anticoagulation, IV pain medication, NTG & IV calcium channel blocker  Consult Cardiology   Called for Urgent cardiac cath: cath revealed acute anterior STEMI S/P HALI x2 to prox LAD      1/6/2020 Cardiology  Assessment/Plan   1  Anterior STEMI-   Urgent cardiac cath   Patient was loaded with Brilinta 180 mg PO daily,  heparin 4000 units and received aspirin prior to hospital   Echocardiogram ordered   Further recommendation post cardiac cath   2  Diabetes- check hemoglobin A1C   Patient takes Humalog 70/30 20 units BID and metformin 1000 mg PO BID   He took prior to admission      ED Triage Vitals   Temperature Pulse Respirations Blood Pressure SpO2   01/06/20 1248 01/06/20 1109 01/06/20 1248 01/06/20 1105 01/06/20 1109   98 4 °F (36 9 °C) 72 16 145/92 94 %      Temp Source Heart Rate Source Patient Position - Orthostatic VS BP Location FiO2 (%)   01/06/20 1248 01/06/20 1248 01/06/20 1248 01/06/20 1248 --   Oral Monitor Lying Left arm       Pain Score       01/06/20 2000       No Pain        Wt Readings from Last 1 Encounters:   01/06/20 102 kg (224 lb 10 4 oz)     Additional Vital Signs:   Date/Time  Temp  Pulse  Resp  BP  MAP (mmHg)  SpO2  O2 Device  Patient Position - Orthostatic VS   01/07/20 1100  98 6 °F (37 °C)  55  18  122/71  91  98 %  --  --   01/07/20 0845  --  63  --  119/63  --  --  --  --   01/07/20 0700  98 4 °F (36 9 °C)  60  16  129/89  105  99 %  --  --   01/07/20 0600  --  59  12  117/62  79  100 %  --  --   01/07/20 0500  --  58  15  119/64  85  99 %  --  --   01/07/20 0400  --  56  15  118/65  86  98 %  --  --   01/07/20 0300  97 9 °F (36 6 °C)  67  18  124/65  88  97 %  Nasal cannula  Lying   01/07/20 0200  --  63  17  139/66  94  97 %  --  --   01/07/20 0100  --  62  9Abnormal   117/67  86  99 %  --  --   01/07/20 0000  --  63  22  147/64  94  97 %  --  --   01/06/20 2300  97 9 °F (36 6 °C)  60  17  131/66  90  98 %  Nasal cannula  Lying   01/06/20 2200  --  64  19  138/70  96  99 %  --  --   01/06/20 2100  --  63  17  130/67  91  98 %  --  --   01/06/20 2000  --  66  18  140/67  97  95 %  --  --   01/06/20 1900  --  65  17  133/63  90 97 %  --  --   01/06/20 1856  98 °F (36 7 °C)  65  16  133/63  90  97 %  Nasal cannula  Lying   01/06/20 1830  --  65  --  128/58  83  --  --  --   01/06/20 1800  --  67  17  130/67  92  97 %  --  --   01/06/20 1730  --  68  --  132/60  87  --  --  --   01/06/20 1700  --  65  13  125/77  95  96 %  --  --   01/06/20 1630  --  61  --  118/61  84  --  --  --   01/06/20 1600  98 °F (36 7 °C)  61  15  123/65  89  100 %  --  --   01/06/20 1530  --  66  --  114/56  77  --  --  --   01/06/20 1500  --  72  14  114/58  84  98 %  --  --   01/06/20 1430  --  73  --  107/55  75  --  --  --   01/06/20 1415  --  78  --  --  --  --  --  --   01/06/20 1400  --  75  --  110/58  81  --  --  --   01/06/20 1345  --  79  --  --  --  --  --  --   01/06/20 1330  --  79  --  130/63  88  --  --  --   01/06/20 1300  --  84  18  141/70  99  94 %  --  --   01/06/20 1248  98 4 °F (36 9 °C)  87  16  --  --  --  --  Lying   01/06/20 11:19:53  --  76  --  --  --  99 %  --  --   01/06/20 11:14:53  --  74  --  --  --  98 %  --  --   01/06/20 11:09:53  --  72  --  --  --  94 %  --  --   01/06/20 11:05:12  --  --  --  145/92  --  --  --  --      Weights (last 14 days) before discharge     Date/Time  Weight  Weight Method   01/06/20 1105  102 kg (224 lb 10 4 oz)  Bed scale       Pertinent Labs/Diagnostic Test Results:   Results from last 7 days   Lab Units 01/07/20  0433 01/06/20  1120 01/06/20  1118   WBC Thousand/uL 12 33*  --  10 41*   HEMOGLOBIN g/dL 14 0  --  15 6   I STAT HEMOGLOBIN g/dl  --  16 3  --    HEMATOCRIT % 42 2  --  47 6   HEMATOCRIT, ISTAT %  --  48  --    PLATELETS Thousands/uL 190  --  202   NEUTROS ABS Thousands/µL  --   --  6 12         Results from last 7 days   Lab Units 01/07/20  0433 01/06/20  1120 01/06/20  1118   SODIUM mmol/L 140  --  142   POTASSIUM mmol/L 4 6  --  4 1   CHLORIDE mmol/L 106  --  103   CO2 mmol/L 26  --  28   CO2, I-STAT mmol/L  --  26  --    AGAP mmol/L  --  16*  --    ANION GAP mmol/L 8  --  11   BUN mg/dL 20  --  20   CREATININE mg/dL 0 94  --  1 33*   EGFR ml/min/1 73sq m 107 69 70   CALCIUM mg/dL 8 6  --  9 4   CALCIUM, IONIZED, ISTAT mmol/L  --  1 08*  --      Results from last 7 days   Lab Units 01/06/20  1118   AST U/L 36   ALT U/L 41   ALK PHOS U/L 82   TOTAL PROTEIN g/dL 8 1   ALBUMIN g/dL 4 6   TOTAL BILIRUBIN mg/dL 0 42     Results from last 7 days   Lab Units 01/07/20  1125 01/07/20  0711 01/06/20  2104 01/06/20  1529 01/06/20  1252 01/06/20  1107   POC GLUCOSE mg/dl 112 76 75 91 79 74     Results from last 7 days   Lab Units 01/07/20  0433 01/06/20  1118   GLUCOSE RANDOM mg/dL 81 105         Results from last 7 days   Lab Units 01/07/20  0433   HEMOGLOBIN A1C % 4 9   EAG mg/dl 94         Results from last 7 days   Lab Units 01/06/20  1118   TROPONIN I ng/mL 0 12*         Results from last 7 days   Lab Units 01/06/20  1118   PROTIME seconds 13 6   INR  1 10   PTT seconds 125*     Results from last 7 days   Lab Units 01/07/20  0433   TSH 3RD GENERATON uIU/mL 1 169     1/6/2020 EKG: Normal sinus rhythm with ST elevation in anterior leads   Ventricular rate 72 bpm  VT interval 148 ms  QRSD 74 ms  QT interval 364 ms  QTc interval 398 ms    1/7/2020 Cardiac cath:  Acute anterior STEMI S/P HALI x2 to prox LAD  Procedures:   Left heart catheterization 01/06/2020  CORONARY CIRCULATION:  Left main: Normal   LAD: The vessel was normal sized  There was a 90% proximal stenosis  This was the culprit for the patient's anterior STEMI  The diagonal branches were free of obstructive disease  Circumflex: The vessel was normal sized and gave rise to two OM brancvhes  There were no significant lesions  RCA: The vessel was normal sized and dominant, giving rise to the PDA and a posterolateral branch  No atherosclerosis was seen    1ST LESION INTERVENTIONS:  Following pre-dilation and IVUS interrogation, a Synergy MR 4 5 x 24mm drug-eluting stent was placed across the 90% lesion in the proximal LAD and deployed at a maximum inflation pressure of 11 ct  Repeat IVUS disclosed incomplete  expansion at the proximal aspect of the stent and 1-2mm of proximal disease that was not covered by the stent  The decision was made to place a second stent  A Synergy MR 5 0 x 12mm drug-eluting stent was positioned to overlap the first    1/7/2020 ECHO:  SUMMARY  LEFT VENTRICLE:  Systolic function was normal   There were no definite regional wall motion abnormalities  Possible mild anteroseptal hypokinesis  Wall thickness was increased  Hypertrophy was noted    Left ventricular diastolic function parameters were normal      ED Treatment:   Medication Administration from 01/06/2020 1103 to 01/06/2020 1246       Date/Time Order Dose Route Action     01/06/2020 1109 fentanyl citrate (PF) 100 MCG/2ML 50 mcg 50 mcg Intravenous Given     01/06/2020 1120 fentanyl citrate (PF) 100 MCG/2ML 50 mcg 50 mcg Intravenous Given     01/06/2020 1136 fentanyl citrate (PF) 100 MCG/2ML 50 mcg Intravenous Given     01/06/2020 1136 midazolam (VERSED) injection 2 mg Intravenous Given     01/06/2020 1140 lidocaine (PF) (XYLOCAINE-MPF) 1 % injection 0 1 mL Infiltration Given     01/06/2020 1143 nitroGLYcerin (TRIDIL) 50 mg in 250 mL infusion (premix) 200 mcg Intra-arterial Given     01/06/2020 1143 verapamil (ISOPTIN) injection 1 25 mg Intra-arterial Given     01/06/2020 1155 heparin (porcine) injection 4,000 Units Intravenous Given     01/06/2020 1143 heparin (porcine) injection 4,000 Units Intravenous Given     01/06/2020 1108 heparin (porcine) injection 4,000 Units 4,000 Units Intravenous Given     01/06/2020 1106 ticagrelor (BRILINTA) tablet 180 mg 180 mg Oral Given     01/06/2020 1211 fentanyl citrate (PF) 100 MCG/2ML 25 mcg Intravenous Given     01/06/2020 1159 fentanyl citrate (PF) 100 MCG/2ML 25 mcg Intravenous Given     01/06/2020 1211 midazolam (VERSED) injection 1 mg Intravenous Given     01/06/2020 1200 midazolam (VERSED) injection 1 mg Intravenous Given 01/06/2020 1241 furosemide (LASIX) injection 20 mg Intravenous Given        Past Medical History:   Diagnosis Date    Diabetes mellitus ([facility] )      Present on Admission:   Acute ST elevation myocardial infarction (STEMI) involving left anterior descending (LAD) coronary artery (HCC)   Diabetes ([facility] )   Cigar smoker    Admitting Diagnosis: STEMI (ST elevation myocardial infarction) ([facility] ) [I21 3]  Age/Sex: 49 y o  male  Admission Orders:  Telemetry  cardiac cath  echo  Scheduled Medications:    Medications:  aspirin 81 mg Oral Daily   atorvastatin 80 mg Oral Daily With Dinner   heparin (porcine) 5,000 Units Subcutaneous Q8H [facility]   insulin aspart protamine-insulin aspart 20 Units Subcutaneous BID AC   insulin lispro 1-5 Units Subcutaneous TID AC   insulin lispro 1-5 Units Subcutaneous HS   metoprolol tartrate 25 mg Oral Q12H [facility]   nicotine 1 patch Transdermal Daily   ticagrelor 90 mg Oral Q12H [facility]     Continuous IV Infusions:  PRN Meds:    acetaminophen 650 mg Oral Q4H PRN   nitroglycerin 0 4 mg Sublingual Q5 Min PRN   ondansetron 4 mg Intravenous Q6H PRN     Network Utilization Review Department  [email or web address]  ATTENTION: Please call with any questions or concerns to [phone] and carefully listen to the prompts so that you are directed to the right person  All voicemails are confidential   Sallye Jerel all requests for admission clinical reviews, approved or denied determinations and any other requests to dedicated fax number below belonging to the campus where the patient is receiving treatment   List of dedicated fax numbers for the Facilities:  FACILITY NAME UR FAX NUMBER   ADMISSION DENIALS (Administrative/Medical Necessity) [phone]   [address]  (Maternity/NICU/Pediatrics) [phone]   Geovani Augustin [phone]   Chichi Doshi [phone]   Jamison Bolaños [phone]   Dillon Hdez   [address] [place] [address] [facility] [phone]   [facility] [address] [phone]   [address] [facility] [phone]

## 2020-01-07 NOTE — NURSING NOTE
Pt received Brilinta from [address] [place] [address], other prescriptions sent to selected [facility] pharmacy, AVS explained and given to patient  Pt discharged walking with family member

## 2020-01-07 NOTE — DISCHARGE INSTRUCTIONS
1  Please see the post cardiac catheterization/ angioseal closure device instructions and stent booklet  No heavy lifting, greater than 10 lbs  or strenuous  activity for 48 hrs  See the post cardiac catheterization/ angioseal closure device instructions  2 Remove band aid tomorrow  Shower and wash area- wrist gently with soap and water- beginning tomorrow  Rinse and pat dry  Apply new water seal band aid  Repeat this process for 5 days  No powders, creams lotions or antibiotic ointments  for 5 days  No tub baths, hot tubs or swimming for 5 days  3 No driving for 2 days    4  Do not stop aspirin or Brilinta (ticagrelor) for any reason without a cardiologists consent, or the stent could block up and cause a heart attack  Coronary Intravascular Stent Placement   AMBULATORY CARE:   What you need to know about coronary intravascular stent placement:  Coronary intravascular stent placement is a procedure to place a stent in an artery of your heart that has plaque buildup  Plaque is a mixture of fat and cholesterol  A stent is a small mesh tube made of metal that helps keep your artery open  Your healthcare provider may place a bare metal stent or a drug-eluting stent (HALI) in your artery  A HALI is coated with medicine that is slowly released and helps prevent more plaque buildup in the area where the stent is placed  The stent remains in your artery for life  You may need more than one stent  How to prepare for your procedure:   · Your healthcare provider will talk to you about how to prepare for your procedure  He may tell you not to eat or drink anything after midnight on the day of your procedure  He will tell you what medicines to take or not take on the day of your procedure  Arrange to have someone drive you home when you leave the hospital      · You may need blood tests and a stress test before your procedure  Talk to your healthcare provider about these or other tests you may need  Contrast liquid will be used during your procedure to help healthcare providers see your heart better  Tell the healthcare provider if you have ever had an allergic reaction to contrast liquid  What will happen during the procedure:   · You may be given an antibiotic through your IV to help prevent a bacterial infection  You will be given medicine in your IV to help you relax or make you drowsy  You will also get local anesthesia that will numb the area where the catheter will be placed  You will be awake during the procedure so healthcare providers can give you instructions  You may be asked to cough, hold your breath, or to tell them how you feel during the procedure  · A catheter (long, thin tube) will be put into an artery in your wrist, groin, or neck  The catheter will be gently guided through this artery to your heart and into the narrowed or blocked artery  Healthcare providers will use x-rays and contrast liquid to find the area where the stent needs to be placed  You may feel warm as the contrast liquid is put into the catheter  This feeling should go away quickly  · A guidewire will then be placed into the catheter  The balloon catheter will be guided into the narrowed or blocked artery using the guidewire  Healthcare providers will inflate the balloon several times for short periods  The inflated balloon pushes the plaque against the artery walls  This opens them and allows more blood flow to your heart  Another balloon catheter with a stent is then inserted into the artery  The balloon is inflated  This expands the stent and pushes it into place against the artery wall  The stent will be left in your artery to help keep it open  What will happen after the procedure:   · The catheter and guidewire will be taken out of the artery and a pressure bandage will be put on the area  Your healthcare provider may apply a collagen plug or other closure device to stop the bleeding   Healthcare providers will put pressure on the bandaged area to help stop the bleeding  · You will be taken to a room to rest  Healthcare providers will monitor you closely for any problems  You will then be taken to your hospital room  You may need to lie still and flat for 3 to 6 hours after the procedure to prevent bleeding  Do not get out of bed until your healthcare provider says it is okay  Risks of the procedure:   · You may develop a hematoma (swelling caused by collection of blood) or bleed more than expected from your catheter site  The contrast liquid used during this procedure may cause an allergic reaction or kidney problems  You may develop an infection  · Your artery may be injured when the catheter is inserted  During or after your procedure, blood clots may form, or plaque may break off  The blood clot or plaque may block your artery and cause a heart attack or stroke  The stent could collapse, or a clot could form on the stent  This could cause the artery to become blocked again  You may need another procedure to open your artery  Call 911 for any of the following:   · You have any of the following signs of a heart attack:      ¨ Squeezing, pressure, or pain in your chest that lasts longer than 5 minutes or returns    ¨ Discomfort or pain in your back, neck, jaw, stomach, or arm     ¨ Trouble breathing    ¨ Nausea or vomiting    ¨ Lightheadedness or a sudden cold sweat, especially with chest pain or trouble breathing    · You have any of the following signs of a stroke:      ¨ Numbness or drooping on one side of your face     ¨ Weakness in an arm or leg    ¨ Confusion or difficulty speaking    ¨ Dizziness, a severe headache, or vision loss  Seek care immediately if:   · Your arm or leg feels warm, tender, and painful  It may look swollen and red  · Your leg or arm becomes numb or turns white or blue      · The area where the catheter was placed is swollen, red, or has pus or foul-smelling fluid coming from it      · You start to bleed from your catheter site again  Contact your cardiologist if:   · You have a fever or chills  · You have questions or concerns about your condition or care  Medicine:   · Medicines  may be given to prevent blood clots around your stent, lower your blood pressure, and decrease cholesterol in your blood  You may also be given medicine to relieve chest pain  · Take your medicine as directed  Contact your healthcare provider if you think your medicine is not helping or if you have side effects  Tell him or her if you are allergic to any medicine  Keep a list of the medicines, vitamins, and herbs you take  Include the amounts, and when and why you take them  Bring the list or the pill bottles to follow-up visits  Carry your medicine list with you in case of an emergency  Activity:   · Avoid unnecessary stair climbing for 48 hours, if a catheter was put in your groin  · Do not place pressure on your arm, hand, or wrist, if the catheter was placed in your wrist  Avoid pushing, pulling, or heavy lifting with that arm  · If you need to cough, support the area where the catheter was inserted with your hand  · Ask your cardiologist how long you need to limit movement and avoid certain activities  · You may feel like resting more after your procedure  Slowly start to do more each day  Rest when you feel it is needed  Wound care:  Ask your cardiologist about how to care for your incision wound  Ask when you can get into a tub, shower, or pool  Do not smoke:  Nicotine and other chemicals in cigarettes and cigars can cause heart and lung damage  Ask your healthcare provider for information if you currently smoke and need help to quit  E-cigarettes or smokeless tobacco still contain nicotine  Talk to your healthcare provider before you use these products  Cardiac rehab:  Your cardiologist may recommend that you attend cardiac rehabilitation (rehab)   This is a program run by specialists who will help you safely strengthen your heart and reduce the risk for more heart disease  The plan includes exercise, relaxation, stress management, and heart-healthy nutrition  Healthcare providers will also check to make sure any medicines you are taking are working  Follow up with your cardiologist as directed: If you need an MRI, wait at least 6 to 8 weeks after stent placement, or as directed  Write down your questions so you remember to ask them during your visits  © 2017 [employer]  Information is for End User's use only and may not be sold, redistributed or otherwise used for commercial purposes  All illustrations and images included in CareNotes® are the copyrighted property of [employer]  or [employer] Candel[employer]  The above information is an  only  It is not intended as medical advice for individual conditions or treatments  Talk to your doctor, nurse or pharmacist before following any medical regimen to see if it is safe and effective for you  Myocardial Infarction   WHAT YOU NEED TO KNOW:   A myocardial infarction (MI) is a heart attack  A heart attack happens when the blood vessels that supply blood to your heart (coronary arteries) are blocked  This can damage your heart  It can lead to an abnormal heart rhythm, heart failure, or may become life-threatening  DISCHARGE INSTRUCTIONS:   Medicines: You may  need any of the following:  · Heart medicines  help decrease blood pressure, control your heart rate, and help your heart function better  · Nitroglycerin  opens the arteries to your heart, increases oxygen levels, and can decrease chest pain  You may get your nitroglycerin as a pill, a patch, or a paste  Ask your healthcare provider or cardiologist how to safely take this medicine  · Aspirin  helps prevent clots from forming and causing blood flow problems   If healthcare providers want you to take aspirin daily, do not take acetaminophen or ibuprofen instead  Do not take more or less aspirin than healthcare providers say to take  If you are on another blood thinner medicine, ask your healthcare provider or cardiologist before you take aspirin for any reason  · Blood thinners    help prevent blood clots  Examples of blood thinners include heparin and warfarin  Clots can cause strokes, heart attacks, and death  The following are general safety guidelines to follow while you are taking a blood thinner:    ¨ Watch for bleeding and bruising while you take blood thinners  Watch for bleeding from your gums or nose  Watch for blood in your urine and bowel movements  Use a soft washcloth on your skin, and a soft toothbrush to brush your teeth  This can keep your skin and gums from bleeding  If you shave, use an electric shaver  Do not play contact sports  ¨ Tell your dentist and other healthcare providers that you take anticoagulants  Wear a bracelet or necklace that says you take this medicine  ¨ Do not start or stop any medicines unless your healthcare provider tells you to  Many medicines cannot be used with blood thinners  ¨ Tell your healthcare provider right away if you forget to take the medicine, or if you take too much  ¨ Warfarin  is a blood thinner that you may need to take  The following are things you should be aware of if you take warfarin  § Foods and medicines can affect the amount of warfarin in your blood  Do not make major changes to your diet while you take warfarin  Warfarin works best when you eat about the same amount of vitamin K every day  Vitamin K is found in green leafy vegetables and certain other foods  Ask for more information about what to eat when you are taking warfarin  § You will need to see your healthcare provider for follow-up visits when you are on warfarin  You will need regular blood tests  These tests are used to decide how much medicine you need      · Cholesterol medicine decreases cholesterol and the amount of plaque in your blood  · Do not take certain medicines without asking your healthcare provider first   These include NSAIDs, herbal or vitamin supplements, or hormones (estrogen or progestin)  · Take your medicine as directed  Contact your healthcare provider if you think your medicine is not helping or if you have side effects  Tell him or her if you are allergic to any medicine  Keep a list of the medicines, vitamins, and herbs you take  Include the amounts, and when and why you take them  Bring the list or the pill bottles to follow-up visits  Carry your medicine list with you in case of an emergency  Cardiac rehabilitation (rehab)  is a program run by specialists who will help you safely strengthen your heart and prevent more heart disease  The plan includes exercise, relaxation, stress management, and heart-healthy nutrition  Healthcare providers will also check to make sure any medicines you take are working  The plan may also include instructions for when you can drive, return to work, and do other normal daily activities  Follow up with your healthcare provider or cardiologist within 14 days or as directed:  Ask for information about continuing care, treatments, and home services  Write down your questions so you remember to ask them during your visits  Lifestyle changes:   · Go to cardiac rehabilitation as directed  This is a program run by specialists who will help you safely strengthen your heart and prevent more heart disease  This plan includes exercise, relaxation, stress management, and heart-healthy nutrition  Healthcare providers will also check to make sure any medicines you are taking are working  The plan may also include instructions for when you can drive, return to work, and do other normal daily activities  · Eat a heart healthy diet    Get enough calories, protein, vitamins, and minerals to help prevent poor nutrition and promote muscle strength  You may be told to eat foods low in cholesterol or sodium (salt)  You also may be told to limit saturated and trans fats  Eat foods that contain healthy fats, such as walnuts, salmon, and canola and soybean oils  Eat foods that help protect the heart, including plenty of fruits and vegetables, nuts, and sources of fiber  · Do not smoke  If you smoke, it is never too late to quit  Smoking increases your risk of another MI      · Exercise  Ask your healthcare provider or cardiologist about the best exercise plan for you  Exercise makes your heart stronger, lowers blood pressure, and helps prevent an MI  The goal is 30 to 60 minutes a day, 5 to 7 days a week  Ask your healthcare provider or cardiologist how often and how long to exercise  · Maintain a healthy weight  Ask your healthcare provider or cardiologist how much you should weigh  Ask him to help you create a weight loss plan if you are overweight  · Manage your stress  Stress may slow healing and lead to illness  Learn ways to control stress, such as relaxation, deep breathing, and music  Talk to someone about things that upset you  · Get a flu vaccine  every year as soon as it is available  The vaccine will help prevent the flu  Ask about other vaccinations you may need  Contact your healthcare provider or cardiologist if:   · You have trouble taking your heart medicine  · You have questions or concerns about your condition or care  Seek care immediately or call 911 if:   · You have any of the following signs of a heart attack:      ¨ Squeezing, pressure, or pain in your chest that lasts longer than 5 minutes or returns    ¨ Discomfort or pain in your back, neck, jaw, stomach, or arm     ¨ Trouble breathing    ¨ Nausea or vomiting    ¨ Lightheadedness or a sudden cold sweat, especially with chest pain or trouble breathing    · You are tired and cannot think clearly       · Your heart is beating faster than usual  · You are bleeding from your gums or nose  · You see blood in your urine or bowel movements  · You urinate less than usual or not at all  · You have new or increased swelling in your feet or ankles  © 2017 [employer] Information is for End User's use only and may not be sold, redistributed or otherwise used for commercial purposes  All illustrations and images included in CareNotes® are the copyrighted property of [employer]  or Christos Gaona  The above information is an  only  It is not intended as medical advice for individual conditions or treatments  Talk to your doctor, nurse or pharmacist before following any medical regimen to see if it is safe and effective for you

## 2020-01-07 NOTE — DISCHARGE SUMMARY
Discharge Summary - Chace Sigala 49 y o  male MRN: [id number]    Unit/Bed#: ICU 04 Encounter: [id number]    Admission Date: 1/6/2020   Discharge Date: 1/7/2020    Disposition: Home    PCP: Dr Adeline Swanson  Interventional cardiologist: Dr Sabino Emmanuel    Discharge Diagnosis:  Acute anterior STEMI S/P HALI x2 to prox LAD  Secondary Diagnoses: Type 2 diabetes, preserved LV function  Condition at Discharge: good   Consultants: N/A  Procedures:   Left heart catheterization 01/06/2020  CORONARY CIRCULATION:  Left main: Normal   LAD: The vessel was normal sized  There was a 90% proximal stenosis  This was the culprit for the patient's anterior STEMI  The diagonal branches were free of obstructive disease  Circumflex: The vessel was normal sized and gave rise to two OM brancvhes  There were no significant lesions  RCA: The vessel was normal sized and dominant, giving rise to the PDA and a posterolateral branch  No atherosclerosis was seen      1ST LESION INTERVENTIONS:  Following pre-dilation and IVUS interrogation, a Synergy MR 4 5 x 24mm drug-eluting stent was placed across the 90% lesion in the proximal LAD and deployed at a maximum inflation pressure of 11 ct  Repeat IVUS disclosed incomplete  expansion at the proximal aspect of the stent and 1-2mm of proximal disease that was not covered by the stent  The decision was made to place a second stent  A Synergy MR 5 0 x 12mm drug-eluting stent was positioned to overlap the first  stent and to cover 1-2mm of residual disease proximal to the first stent, without extending into the left main ostiaum  The stent was deployed at a maximum inflation pressure of 11 ct  There was 0% residual stenosis  IVUS disclosed full  expansion and apposition of both stents  Echocardiogram 1/6/2020: Normal LV systolic function, possible mild anteroseptal hypokinesis, hypertrophy was noted  Normal LV diastolic function  Bi-atrial dilatation, trace MR, trace TR       /71   Pulse 55 Temp 98 6 °F (37 °C) (Oral)   Resp 18   Wt 102 kg (224 lb 10 4 oz)   SpO2 98%   BMI 30 47 kg/m²   ROS  Physical Exam  Please see progress note from day of discharge for ROS and physical exam     HPI and Hospital Course: Elo Murcia is a 49year old male with diabetes, tobacco use, and marijuana use who presented to [facility] ED as a pre-hospital MI alert 1/6/2020  He went to the gym that morning without any exertional symptoms  Once he returned home, however, he developed 10/10 mid-sternal chest pain with radiation to both arms, his back, and his abdomen  This was associated with shortness of breath  ECG showed anterior ST elevations with reciprocal changes in inferior leads  He received 324 mg ASA en route and was then loaded with 180 mg Brilinta and 4,000 units IV heparin upon arrival to the ED  He was taken emergently for cardiac catheterization which showed 90% proximal LAD stenosis  He is now status post successful placement of 2 overlapping drug-eluting stents with 0% residual stenosis  An echocardiogram demonstrated normal LV systolic function  He recovered without event; vitals and labs are stable on day of discharge  Right radial cath site healing well without active bleeding or hematoma  He will be discharged on DAPT with ASA and Brilinta  Jaelynjose Shresthai will be a $0 copay for him  High intensity statin and beta blocker will also be prescribed at discharge  He has follow up scheduled in the office on 1/20/20 at which time he will be referred to cardiac rehab, if appropriate  Discharge Medications:  See after visit summary for reconciled discharge medications provided to patient and family        Pertinent Labs/diagnostics:  Results from last 7 days   Lab Units 01/06/20  1118   TROPONIN I ng/mL 0 12*     CBC with diff:   Results from last 7 days   Lab Units 01/07/20  0433 01/06/20  1120 01/06/20  1118   WBC Thousand/uL 12 33*  --  10 41*   HEMOGLOBIN g/dL 14 0  --  15 6   I STAT HEMOGLOBIN g/dl  --  16 3  --    HEMATOCRIT % 42 2  --  47 6   HEMATOCRIT, ISTAT %  --  48  --    MCV fL 90  --  91   PLATELETS Thousands/uL 190  --  202   MCH pg 29 9  --  29 9   MCHC g/dL 33 2  --  32 8   RDW % 14 4  --  14 4   MPV fL 11 8  --  11 3   NRBC AUTO /100 WBCs  --   --  0     CMP:  Results from last 7 days   Lab Units 20  0433 20  1120 20  1118   POTASSIUM mmol/L 4 6  --  4 1   CHLORIDE mmol/L 106  --  103   CO2 mmol/L 26  --  28   CO2, I-STAT mmol/L  --  26  --    BUN mg/dL 20  --  20   CREATININE mg/dL 0 94  --  1 33*   GLUCOSE, ISTAT mg/dl  --  101  --    CALCIUM mg/dL 8 6  --  9 4   AST U/L  --   --  36   ALT U/L  --   --  41   ALK PHOS U/L  --   --  82   EGFR ml/min/1 73sq m 107 69 70     Lipid Profile:   No results found for: CHOL  Lab Results   Component Value Date    HDL 56 2020     Lab Results   Component Value Date    LDLCALC 61 2020     Lab Results   Component Value Date    TRIG 39 2020     Cardiac testing:   Results for orders placed during the hospital encounter of 20   Echo complete with contrast if indicated    Narrative [place] [address] [address] [place] [address]  [phone]    Transthoracic Echocardiogram  2D, M-mode, Doppler, and Color Doppler    Study date:  2020    Patient: Prabha Lerner  MR number: [id number]  Account number: [de-identified]  : 1965  Age: 49 years  Gender: Male  Status: Inpatient  Location: Bedside  Height: 72 in  Weight: 223 5 lb  BP: 145/ 92 mmHg    Indications: MI    Diagnoses: I21 3 - ST elevation (STEMI) myocardial infarction of unspecified site    Sonographer:  Ayesha Lazcano RDCS  Interpreting Physician:  Johanny Peterson MD  Referring Physician:  Johanny Peterson MD  Group:  [facility]    SUMMARY    LEFT VENTRICLE:  Systolic function was normal   There were no definite regional wall motion abnormalities  Possible mild anteroseptal hypokinesis    Wall thickness was increased  Hypertrophy was noted  Left ventricular diastolic function parameters were normal     TRICUSPID VALVE:  Estimated peak PA pressure was 35 - 40 mmHg  HISTORY: PRIOR HISTORY: DM, Smoker    PROCEDURE: The procedure was performed at the bedside  This was a routine study  The transthoracic approach was used  The study included complete 2D imaging, M-mode, complete spectral Doppler, and color Doppler  The heart rate was 67 bpm,  at the start of the study  Images were obtained from the parasternal, apical, subcostal, and suprasternal notch acoustic windows  Image quality was adequate  LEFT VENTRICLE: Size was normal  Systolic function was normal  There were no definite regional wall motion abnormalities  Possible mild anteroseptal hypokinesis  Wall thickness was increased  Hypertrophy was noted  DOPPLER: The ratio of  early ventricular filling to atrial contraction velocities was within the normal range  Left ventricular diastolic function parameters were normal     RIGHT VENTRICLE: The size was normal  Systolic function was normal     LEFT ATRIUM: The atrium was mildly dilated  RIGHT ATRIUM: The atrium was mildly dilated  MITRAL VALVE: Valve structure was normal  There was normal leaflet separation  DOPPLER: There was no evidence for stenosis  There was trace regurgitation  AORTIC VALVE: The valve was probably trileaflet  Leaflets exhibited normal thickness and normal cuspal separation  DOPPLER: There was no evidence for stenosis  There was no significant regurgitation  TRICUSPID VALVE: The valve structure was normal  There was normal leaflet separation  DOPPLER: There was no evidence for stenosis  There was mild regurgitation  Estimated peak PA pressure was 35 - 40 mmHg  PULMONIC VALVE: Leaflets exhibited normal thickness, no calcification, and normal cuspal separation  DOPPLER: The transpulmonic velocity was within the normal range  There was trace regurgitation      PERICARDIUM: There was no pericardial effusion  The pericardium was normal in appearance  AORTA: The root exhibited normal size  SYSTEM MEASUREMENT TABLES    2D  %FS: 36 46 %  Ao Diam: 2 8 cm  EDV(Teich): 126 07 ml  EF(Teich): 65 86 %  ESV(Teich): 43 03 ml  IVSd: 1 26 cm  LA Area: 18 92 cm2  LA Diam: 3 24 cm  LVEDV MOD A4C: 105 93 ml  LVEF MOD A4C: 62 01 %  LVESV MOD A4C: 40 24 ml  LVIDd: 5 14 cm  LVIDs: 3 27 cm  LVLd A4C: 9 15 cm  LVLs A4C: 7 25 cm  LVPWd: 1 cm  RA Area: 20 84 cm2  RVIDd: 3 43 cm  SV MOD A4C: 65 68 ml  SV(Teich): 83 04 ml    CW  TR MaxP 16 mmHg  TR Vmax: 2 92 m/s    PW  E': 0 09 m/s  E/E': 11 08  MV A Jg: 0 81 m/s  MV Dec Wells: 5 31 m/s2  MV DecT: 180 64 ms  MV E Jg: 0 96 m/s  MV E/A Ratio: 1 19  MV PHT: 52 39 ms  MVA By PHT: 4 2 cm2    [facility]    Prepared and electronically signed by    Kelsie Saavedra MD  Signed 2020 15:56:52       Discharge instructions/Information to patient and family:   See after visit summary for information provided to patient and family  Provisions for Follow-Up Care:  See after visit summary for information related to follow-up care and any pertinent home health orders  Planned Readmission: No    Discharge Statement   I spent 45 minutes minutes discharging the patient  This time was spent on the day of discharge  I had direct contact with the patient on the day of discharge  Additional documentation is required if more than 30 minutes were spent on discharge  ** Please Note: Dragon 360 Dictation voice to text software may have been used in the creation of this document   **

## 2020-02-29 DIAGNOSIS — I21.02 ACUTE ST ELEVATION MYOCARDIAL INFARCTION (STEMI) INVOLVING LEFT ANTERIOR DESCENDING (LAD) CORONARY ARTERY (HCC): ICD-10-CM

## 2020-03-03 DIAGNOSIS — I21.02 ACUTE ST ELEVATION MYOCARDIAL INFARCTION (STEMI) INVOLVING LEFT ANTERIOR DESCENDING (LAD) CORONARY ARTERY (HCC): ICD-10-CM

## 2020-07-08 RX ORDER — NITROGLYCERIN 0.4 MG/1
0.4 TABLET SUBLINGUAL
Qty: 100 TABLET | Refills: 0 | OUTPATIENT
Start: 2020-07-08

## 2020-07-08 RX ORDER — ATORVASTATIN CALCIUM 80 MG/1
TABLET, FILM COATED ORAL
Qty: 30 TABLET | Refills: 1 | OUTPATIENT
Start: 2020-07-08

## 2020-08-29 DIAGNOSIS — I21.02 ACUTE ST ELEVATION MYOCARDIAL INFARCTION (STEMI) INVOLVING LEFT ANTERIOR DESCENDING (LAD) CORONARY ARTERY (HCC): ICD-10-CM

## 2020-08-29 DIAGNOSIS — I21.3 ST ELEVATION (STEMI) MYOCARDIAL INFARCTION OF UNSPECIFIED SITE (HCC): ICD-10-CM

## 2020-09-01 RX ORDER — ATORVASTATIN CALCIUM 80 MG/1
TABLET, FILM COATED ORAL
Qty: 90 TABLET | Refills: 1 | OUTPATIENT
Start: 2020-09-01

## 2020-09-01 RX ORDER — ASPIRIN 81 MG/1
TABLET, COATED ORAL
Qty: 90 TABLET | Refills: 1 | OUTPATIENT
Start: 2020-09-01

## 2020-09-01 NOTE — TELEPHONE ENCOUNTER
He must come in to establish care considering he had a STEMI in January and it appears that he had no follow-up anywhere status post hospitalization    Please call and schedule this patient urgently

## 2020-09-17 ENCOUNTER — TELEPHONE (OUTPATIENT)
Dept: CARDIOLOGY CLINIC | Facility: CLINIC | Age: 55
End: 2020-09-17

## 2020-09-17 DIAGNOSIS — I21.3 STEMI (ST ELEVATION MYOCARDIAL INFARCTION) (HCC): ICD-10-CM

## 2020-09-17 NOTE — TELEPHONE ENCOUNTER
Called patient  He sounded confused and said he didn't call in for anything but was going to check with his regular PCP  He will call us back after he talks to his doctor

## 2020-10-27 ENCOUNTER — OFFICE VISIT (OUTPATIENT)
Dept: FAMILY MEDICINE CLINIC | Facility: CLINIC | Age: 55
End: 2020-10-27
Payer: COMMERCIAL

## 2020-10-27 VITALS
BODY MASS INDEX: 29.93 KG/M2 | TEMPERATURE: 97.8 F | SYSTOLIC BLOOD PRESSURE: 128 MMHG | OXYGEN SATURATION: 98 % | HEIGHT: 72 IN | DIASTOLIC BLOOD PRESSURE: 78 MMHG | RESPIRATION RATE: 12 BRPM | WEIGHT: 221 LBS | HEART RATE: 76 BPM

## 2020-10-27 DIAGNOSIS — E78.2 MIXED HYPERLIPIDEMIA: ICD-10-CM

## 2020-10-27 DIAGNOSIS — E11.00 TYPE 2 DIABETES MELLITUS WITH HYPEROSMOLARITY WITHOUT COMA, WITH LONG-TERM CURRENT USE OF INSULIN (HCC): ICD-10-CM

## 2020-10-27 DIAGNOSIS — Z79.4 TYPE 2 DIABETES MELLITUS WITH HYPEROSMOLARITY WITHOUT COMA, WITH LONG-TERM CURRENT USE OF INSULIN (HCC): ICD-10-CM

## 2020-10-27 DIAGNOSIS — I21.02 ACUTE ST ELEVATION MYOCARDIAL INFARCTION (STEMI) INVOLVING LEFT ANTERIOR DESCENDING (LAD) CORONARY ARTERY (HCC): ICD-10-CM

## 2020-10-27 DIAGNOSIS — E08.41 DIABETIC MONONEUROPATHY ASSOCIATED WITH DIABETES MELLITUS DUE TO UNDERLYING CONDITION (HCC): ICD-10-CM

## 2020-10-27 DIAGNOSIS — I21.3 STEMI (ST ELEVATION MYOCARDIAL INFARCTION) (HCC): Primary | ICD-10-CM

## 2020-10-27 PROCEDURE — 99213 OFFICE O/P EST LOW 20 MIN: CPT | Performed by: NURSE PRACTITIONER

## 2020-10-27 PROCEDURE — 3725F SCREEN DEPRESSION PERFORMED: CPT | Performed by: NURSE PRACTITIONER

## 2020-10-27 PROCEDURE — 3008F BODY MASS INDEX DOCD: CPT | Performed by: NURSE PRACTITIONER

## 2020-10-27 RX ORDER — GABAPENTIN 600 MG/1
600 TABLET ORAL 2 TIMES DAILY
Qty: 180 TABLET | Refills: 1 | Status: SHIPPED | OUTPATIENT
Start: 2020-10-27 | End: 2021-03-11

## 2020-10-27 RX ORDER — GABAPENTIN 600 MG/1
1 TABLET ORAL 2 TIMES DAILY
COMMUNITY
End: 2020-10-27 | Stop reason: SDUPTHER

## 2020-10-27 RX ORDER — ASPIRIN 81 MG/1
81 TABLET, CHEWABLE ORAL DAILY
Qty: 90 TABLET | Refills: 1 | Status: SHIPPED | OUTPATIENT
Start: 2020-10-27 | End: 2021-06-16 | Stop reason: SDUPTHER

## 2020-10-27 RX ORDER — ATORVASTATIN CALCIUM 80 MG/1
80 TABLET, FILM COATED ORAL
Qty: 90 TABLET | Refills: 1 | Status: SHIPPED | OUTPATIENT
Start: 2020-10-27 | End: 2021-05-24

## 2020-10-27 RX ORDER — INSULIN NPH HUM/REG INSULIN HM 70-30/ML
1 VIAL (ML) SUBCUTANEOUS 2 TIMES DAILY
COMMUNITY
End: 2021-03-11

## 2021-01-05 ENCOUNTER — TELEPHONE (OUTPATIENT)
Dept: FAMILY MEDICINE CLINIC | Facility: CLINIC | Age: 56
End: 2021-01-05

## 2021-01-05 NOTE — TELEPHONE ENCOUNTER
L/M for patient to inform him his upcoming appointment was changed from 8:30AM to 2:00PM for 1/27/2021 per provider request  If he needs different time or date please reschedule

## 2021-01-19 DIAGNOSIS — I21.02 ACUTE ST ELEVATION MYOCARDIAL INFARCTION (STEMI) INVOLVING LEFT ANTERIOR DESCENDING (LAD) CORONARY ARTERY (HCC): ICD-10-CM

## 2021-03-10 PROBLEM — Z79.4 TYPE 2 DIABETES MELLITUS WITHOUT COMPLICATION, WITH LONG-TERM CURRENT USE OF INSULIN (HCC): Status: ACTIVE | Noted: 2020-01-06

## 2021-03-10 PROBLEM — I25.10 CORONARY ARTERY DISEASE INVOLVING NATIVE CORONARY ARTERY OF NATIVE HEART WITHOUT ANGINA PECTORIS: Status: ACTIVE | Noted: 2021-03-10

## 2021-03-10 PROBLEM — I21.02 ACUTE ST ELEVATION MYOCARDIAL INFARCTION (STEMI) INVOLVING LEFT ANTERIOR DESCENDING (LAD) CORONARY ARTERY (HCC): Status: RESOLVED | Noted: 2020-01-06 | Resolved: 2021-03-10

## 2021-03-10 PROBLEM — E11.42 DIABETIC POLYNEUROPATHY (HCC): Status: ACTIVE | Noted: 2021-03-10

## 2021-03-11 ENCOUNTER — OFFICE VISIT (OUTPATIENT)
Dept: FAMILY MEDICINE CLINIC | Facility: CLINIC | Age: 56
End: 2021-03-11
Payer: COMMERCIAL

## 2021-03-11 VITALS
DIASTOLIC BLOOD PRESSURE: 82 MMHG | BODY MASS INDEX: 30.61 KG/M2 | TEMPERATURE: 97.2 F | HEIGHT: 72 IN | OXYGEN SATURATION: 97 % | WEIGHT: 226 LBS | HEART RATE: 67 BPM | SYSTOLIC BLOOD PRESSURE: 142 MMHG

## 2021-03-11 DIAGNOSIS — Z76.89 ENCOUNTER TO ESTABLISH CARE WITH NEW DOCTOR: Primary | ICD-10-CM

## 2021-03-11 DIAGNOSIS — Z12.11 COLON CANCER SCREENING: ICD-10-CM

## 2021-03-11 DIAGNOSIS — Z79.4 TYPE 2 DIABETES MELLITUS WITHOUT COMPLICATION, WITH LONG-TERM CURRENT USE OF INSULIN (HCC): ICD-10-CM

## 2021-03-11 DIAGNOSIS — E11.9 TYPE 2 DIABETES MELLITUS WITHOUT COMPLICATION, WITH LONG-TERM CURRENT USE OF INSULIN (HCC): ICD-10-CM

## 2021-03-11 DIAGNOSIS — Z12.5 PROSTATE CANCER SCREENING: ICD-10-CM

## 2021-03-11 DIAGNOSIS — Z28.21 INFLUENZA VACCINATION DECLINED: ICD-10-CM

## 2021-03-11 DIAGNOSIS — I25.10 CORONARY ARTERY DISEASE INVOLVING NATIVE CORONARY ARTERY OF NATIVE HEART WITHOUT ANGINA PECTORIS: ICD-10-CM

## 2021-03-11 PROBLEM — E78.2 MIXED HYPERLIPIDEMIA: Status: ACTIVE | Noted: 2021-03-11

## 2021-03-11 PROBLEM — G62.9 NEUROPATHY: Status: ACTIVE | Noted: 2021-03-11

## 2021-03-11 LAB — SL AMB POCT HEMOGLOBIN AIC: 5.4 (ref ?–6.5)

## 2021-03-11 PROCEDURE — 36416 COLLJ CAPILLARY BLOOD SPEC: CPT | Performed by: INTERNAL MEDICINE

## 2021-03-11 PROCEDURE — 83036 HEMOGLOBIN GLYCOSYLATED A1C: CPT | Performed by: INTERNAL MEDICINE

## 2021-03-11 PROCEDURE — 3044F HG A1C LEVEL LT 7.0%: CPT | Performed by: INTERNAL MEDICINE

## 2021-03-11 PROCEDURE — 3008F BODY MASS INDEX DOCD: CPT | Performed by: INTERNAL MEDICINE

## 2021-03-11 PROCEDURE — 1036F TOBACCO NON-USER: CPT | Performed by: INTERNAL MEDICINE

## 2021-03-11 PROCEDURE — 99214 OFFICE O/P EST MOD 30 MIN: CPT | Performed by: INTERNAL MEDICINE

## 2021-03-11 RX ORDER — PEN NEEDLE, DIABETIC 29 G X1/2"
NEEDLE, DISPOSABLE MISCELLANEOUS 4 TIMES DAILY
COMMUNITY
Start: 2020-12-24 | End: 2021-06-16

## 2021-03-11 RX ORDER — INSULIN NPH HUM/REG INSULIN HM 70-30/ML
20 VIAL (ML) SUBCUTANEOUS 2 TIMES DAILY
Qty: 12 ML | Refills: 3 | Status: CANCELLED | OUTPATIENT
Start: 2021-03-11 | End: 2021-04-10

## 2021-03-11 NOTE — ASSESSMENT & PLAN NOTE
Lab Results   Component Value Date    HGBA1C 5 4 03/11/2021   In light of today's A1c, will d/c /hold insulin and continue the metformin-pt was advised to adhere to his diet and exercise program-needs podiatrist and ophthalmologist

## 2021-03-11 NOTE — ASSESSMENT & PLAN NOTE
Lab Results   Component Value Date    HGBA1C 5 4 03/11/2021   On gabapentin, but not really using it

## 2021-03-11 NOTE — PROGRESS NOTES
Assessment/Plan:         Problem List Items Addressed This Visit        Endocrine    Type 2 diabetes mellitus without complication, with long-term current use of insulin ([facility] [address] )       Lab Results   Component Value Date    HGBA1C 5 4 03/11/2021   In light of today's A1c, will d/c /hold insulin and continue the metformin-pt was advised to adhere to his diet and exercise program-needs podiatrist and ophthalmologist         Relevant Orders    CBC and differential    Comprehensive metabolic panel    TSH, 3rd generation    Microalbumin / creatinine urine ratio    POCT hemoglobin A1c (Completed)    Ambulatory referral to Podiatry    Ambulatory referral to Ophthalmology       Cardiovascular and Mediastinum    Coronary artery disease involving native coronary artery of native heart without angina pectoris     Needs to see cardiologist          Relevant Orders    Ambulatory referral to Cardiology      Other Visit Diagnoses     Encounter to establish care with new doctor    -  Primary    Prostate cancer screening        Relevant Orders    PSA, Total Screen    Colon cancer screening        discussed cologuard and colonoscopy but pt declines at the moment    BMI 30 0-30 9,adult        Influenza vaccination declined                Subjective:      Patient ID: Pritesh El is a 48 y o  male  Rachel Escalona here to establish care-he has a hx of CAD s/p placement of 2 HALI in January 2020, DM II, HTN, HL, has been out of his insulin for several days but A1c% came back at 5 4% today!  He is on a very strict lean protein, low carb diet, and exercises essentially all day-doing great with no residual cardiac symptoms-he has not seen a cardiologist in follow up because of covid, so needs to be referred-is compliant with his other medications-declines other screening tests, such as colonoscopy or cologuard for now-still taking Yoselin Donahue, so will defer to Cardiology as to how much longer to continue that      The following portions of the patient's history were reviewed and updated as appropriate:   Past Medical History:  He has a past medical history of Acute ST segment elevation myocardial infarction ([facility] ), Hyperlipidemia, Myocardial infarction ([facility] ), Neuropathy, Nocturia, Type II diabetes mellitus ([facility] ), and Vitamin D deficiency  ,  _______________________________________________________________________  Medical Problems:  does not have any pertinent problems on file ,  _______________________________________________________________________  Past Surgical History:   has a past surgical history that includes Other surgical history; Cardiac catheterization (1/60/20); and Coronary angioplasty with stent (01/06/2020)  ,  _______________________________________________________________________  Family History:  family history includes Diabetes in his mother; Pancreatic cancer in his mother ,  _______________________________________________________________________  Social History:   reports that he has quit smoking  His smoking use included cigarettes  He has never used smokeless tobacco  He reports previous alcohol use  He reports current drug use  Drug: Marijuana  ,  _______________________________________________________________________  Allergies:  has No Known Allergies     _______________________________________________________________________  Current Outpatient Medications   Medication Sig Dispense Refill    aspirin 81 mg chewable tablet Chew 1 tablet (81 mg total) daily 90 tablet 1    atorvastatin (LIPITOR) 80 mg tablet Take 1 tablet (80 mg total) by mouth daily with dinner 90 tablet 1    metFORMIN (GLUCOPHAGE) 1000 MG tablet Take 1 tablet by mouth 2 (two) times a day      metoprolol tartrate (LOPRESSOR) 25 mg tablet TAKE 1 TABLET (25 MG TOTAL) BY MOUTH EVERY 12 (TWELVE) HOURS 60 tablet 2    nitroglycerin (NITROSTAT) 0 4 mg SL tablet Place 1 tablet (0 4 mg total) under the tongue every 5 (five) minutes as needed for chest pain 90 tablet 0    ticagrelor (BRILINTA) 90 MG Take 1 tablet (90 mg total) by mouth every 12 (twelve) hours 90 tablet 1    BD Insulin Syringe U/F 31G X 5/16" 0 5 ML MISC 4 (four) times a day       No current facility-administered medications for this visit       _______________________________________________________________________  Review of Systems   Constitutional: Negative  Negative for fever  HENT: Negative  Respiratory: Negative  Cardiovascular: Negative  Gastrointestinal: Negative  Endocrine: Negative  Genitourinary: Negative  Musculoskeletal: Negative  Neurological: Positive for numbness  Hematological: Negative for adenopathy  Psychiatric/Behavioral: Negative  Patient's shoes and socks removed  Right Foot/Ankle   Right Foot Inspection  Skin Exam: skin normal and skin intact no dry skin, no warmth, no callus, no erythema, no maceration, no abnormal color, no pre-ulcer, no ulcer and no callus                          Toe Exam: ROM and strength within normal limits  Sensory       Monofilament testing: diminished  Vascular  Capillary refills: < 3 seconds  The right DP pulse is 1+  The right PT pulse is 1+  Left Foot/Ankle  Left Foot Inspection  Skin Exam: skin normal and skin intactno dry skin, no warmth, no erythema, no maceration, normal color, no pre-ulcer, no ulcer and no callus                         Toe Exam: ROM and strength within normal limits                   Sensory       Monofilament: diminished  Vascular  Capillary refills: < 3 seconds  The left DP pulse is 1+  The left PT pulse is 1+  Assign Risk Category:  No deformity present; Loss of protective sensation; Weak pulses       Risk: 2    Objective:  Vitals:    03/11/21 0959   BP: 142/82   BP Location: Left arm   Patient Position: Sitting   Cuff Size: Standard   Pulse: 67   Temp: (!) 97 2 °F (36 2 °C)   TempSrc: Temporal   SpO2: 97%   Weight: 103 kg (226 lb)   Height: 6' (1 829 m)     Body mass index is 30 65 kg/m²  Physical Exam  Vitals signs reviewed  Constitutional:       Appearance: He is well-developed  HENT:      Head: Normocephalic and atraumatic  Right Ear: External ear normal       Left Ear: External ear normal       Nose: Nose normal    Eyes:      Conjunctiva/sclera: Conjunctivae normal       Pupils: Pupils are equal, round, and reactive to light  Neck:      Musculoskeletal: Normal range of motion and neck supple  Cardiovascular:      Rate and Rhythm: Normal rate and regular rhythm  Pulses: Pulses are weak  Dorsalis pedis pulses are 1+ on the right side and 1+ on the left side  Posterior tibial pulses are 1+ on the right side and 1+ on the left side  Heart sounds: No murmur  Pulmonary:      Effort: Pulmonary effort is normal       Breath sounds: Normal breath sounds  Abdominal:      Palpations: Abdomen is soft  Tenderness: There is no abdominal tenderness  There is no guarding  Musculoskeletal: Normal range of motion  Feet:      Right foot:      Skin integrity: No ulcer, skin breakdown, erythema, warmth, callus or dry skin  Left foot:      Skin integrity: No ulcer, skin breakdown, erythema, warmth, callus or dry skin  Skin:     General: Skin is warm  Findings: No rash  Neurological:      Mental Status: He is alert and oriented to person, place, and time  Psychiatric:         Behavior: Behavior normal          Thought Content: Thought content normal          Judgment: Judgment normal        BMI Counseling: Body mass index is 30 65 kg/m²  The BMI is above normal  Nutrition recommendations include reducing portion sizes, decreasing overall calorie intake, 3-5 servings of fruits/vegetables daily, reducing fast food intake, consuming healthier snacks, decreasing soda and/or juice intake and moderation in carbohydrate intake  Exercise recommendations include joining a gym

## 2021-05-24 DIAGNOSIS — I21.02 ACUTE ST ELEVATION MYOCARDIAL INFARCTION (STEMI) INVOLVING LEFT ANTERIOR DESCENDING (LAD) CORONARY ARTERY (HCC): ICD-10-CM

## 2021-05-24 RX ORDER — ATORVASTATIN CALCIUM 80 MG/1
TABLET, FILM COATED ORAL
Qty: 90 TABLET | Refills: 1 | Status: SHIPPED | OUTPATIENT
Start: 2021-05-24

## 2021-06-11 DIAGNOSIS — I21.3 STEMI (ST ELEVATION MYOCARDIAL INFARCTION) (HCC): ICD-10-CM

## 2021-06-11 RX ORDER — TICAGRELOR 90 MG/1
TABLET ORAL
Qty: 60 TABLET | Refills: 1 | Status: SHIPPED | OUTPATIENT
Start: 2021-06-11 | End: 2021-08-20

## 2021-06-16 ENCOUNTER — OFFICE VISIT (OUTPATIENT)
Dept: FAMILY MEDICINE CLINIC | Facility: CLINIC | Age: 56
End: 2021-06-16
Payer: COMMERCIAL

## 2021-06-16 VITALS
TEMPERATURE: 97.9 F | BODY MASS INDEX: 29.61 KG/M2 | RESPIRATION RATE: 16 BRPM | HEIGHT: 72 IN | HEART RATE: 73 BPM | SYSTOLIC BLOOD PRESSURE: 130 MMHG | DIASTOLIC BLOOD PRESSURE: 90 MMHG | OXYGEN SATURATION: 98 % | WEIGHT: 218.6 LBS

## 2021-06-16 DIAGNOSIS — I21.02 ACUTE ST ELEVATION MYOCARDIAL INFARCTION (STEMI) INVOLVING LEFT ANTERIOR DESCENDING (LAD) CORONARY ARTERY (HCC): ICD-10-CM

## 2021-06-16 DIAGNOSIS — I25.10 CORONARY ARTERY DISEASE INVOLVING NATIVE CORONARY ARTERY OF NATIVE HEART WITHOUT ANGINA PECTORIS: ICD-10-CM

## 2021-06-16 DIAGNOSIS — Z12.12 SCREENING FOR COLORECTAL CANCER: ICD-10-CM

## 2021-06-16 DIAGNOSIS — E78.2 MIXED HYPERLIPIDEMIA: ICD-10-CM

## 2021-06-16 DIAGNOSIS — E11.42 TYPE 2 DIABETES MELLITUS WITH DIABETIC POLYNEUROPATHY, WITHOUT LONG-TERM CURRENT USE OF INSULIN (HCC): Primary | ICD-10-CM

## 2021-06-16 DIAGNOSIS — Z11.59 NEED FOR HEPATITIS C SCREENING TEST: ICD-10-CM

## 2021-06-16 DIAGNOSIS — Z12.5 PROSTATE CANCER SCREENING: ICD-10-CM

## 2021-06-16 DIAGNOSIS — Z00.00 ANNUAL PHYSICAL EXAM: ICD-10-CM

## 2021-06-16 DIAGNOSIS — Z12.11 SCREENING FOR COLORECTAL CANCER: ICD-10-CM

## 2021-06-16 PROBLEM — G62.9 NEUROPATHY: Status: RESOLVED | Noted: 2021-03-11 | Resolved: 2021-06-16

## 2021-06-16 PROBLEM — F17.290 CIGAR SMOKER: Status: RESOLVED | Noted: 2020-01-07 | Resolved: 2021-06-16

## 2021-06-16 LAB — SL AMB POCT HEMOGLOBIN AIC: 5.9 (ref ?–6.5)

## 2021-06-16 PROCEDURE — 3008F BODY MASS INDEX DOCD: CPT | Performed by: FAMILY MEDICINE

## 2021-06-16 PROCEDURE — 1036F TOBACCO NON-USER: CPT | Performed by: FAMILY MEDICINE

## 2021-06-16 PROCEDURE — 36416 COLLJ CAPILLARY BLOOD SPEC: CPT | Performed by: FAMILY MEDICINE

## 2021-06-16 PROCEDURE — 3725F SCREEN DEPRESSION PERFORMED: CPT | Performed by: FAMILY MEDICINE

## 2021-06-16 PROCEDURE — 99396 PREV VISIT EST AGE 40-64: CPT | Performed by: FAMILY MEDICINE

## 2021-06-16 PROCEDURE — 3044F HG A1C LEVEL LT 7.0%: CPT | Performed by: FAMILY MEDICINE

## 2021-06-16 PROCEDURE — 83036 HEMOGLOBIN GLYCOSYLATED A1C: CPT | Performed by: FAMILY MEDICINE

## 2021-06-16 RX ORDER — ASPIRIN 81 MG/1
81 TABLET, CHEWABLE ORAL DAILY
Qty: 90 TABLET | Refills: 1 | Status: SHIPPED | OUTPATIENT
Start: 2021-06-16

## 2021-06-16 NOTE — PROGRESS NOTES
Assessment/Plan:    No problem-specific Assessment & Plan notes found for this encounter  Diagnoses and all orders for this visit:    Type 2 diabetes mellitus with diabetic polyneuropathy, without long-term current use of insulin (HCC)  -     Microalbumin / creatinine urine ratio ([facility]); Future  -     Ambulatory Referral to Ophthalmology; Future  -     metFORMIN (GLUCOPHAGE) 1000 MG tablet; Take 1 tablet (1,000 mg total) by mouth 2 (two) times a day  His A1c in office today is 5 9  Will continue the metformin  Refilled today  Encouraged him to see eye doctor  Referral placed today  Need for hepatitis C screening test  -     Hepatitis C Antibody ([facility]); Future    Screening for colorectal cancer    Acute ST elevation myocardial infarction (STEMI) involving left anterior descending (LAD) coronary artery (HCC)  -     aspirin 81 mg chewable tablet; Chew 1 tablet (81 mg total) daily  -     Ambulatory referral to Cardiology; Future    Mixed hyperlipidemia  -     Lipid panel; Future  Lab Results   Component Value Date    CHOLESTEROL 125 01/07/2020     Lab Results   Component Value Date    HDL 56 01/07/2020     Lab Results   Component Value Date    TRIG 39 01/07/2020     No results found for: Haley  Tolerating high dose statin  Will repeat lipid panel and LFTs  Coronary artery disease involving native coronary artery of native heart without angina pectoris  -     Lipid panel; Future  -     Ambulatory referral to Cardiology; Future  S/p HALI in January of 2020  Still on brillinta  Encouraged f/u with cardiology referral placed again today  Prostate cancer screening  -     PSA, Total Screen; Future    Annual physical exam  Discussed diet and exercise  He does not see dentist    Discussed USPSTF recommendations for prostate cancer screening  Ordered PSA  Has never had colonoscopy  Declines this  Is agreeable to oli  Advised him to call insurance company to be sure it is covered     He declines pneumovax  Had both covid vaccines  Other orders  -     Cancel: PNEUMOCOCCAL POLYSACCHARIDE VACCINE 23-VALENT =>1YO SQ IM  -     Cancel: IRIS Diabetic eye exam  -     Cancel: HIV 1/2 Antigen/Antibody (4th Generation) w Reflex [facility]; Future  -     Cancel: TDAP VACCINE GREATER THAN OR EQUAL TO 8YO IM  -     Cancel: Ambulatory referral to Gastroenterology; Future  -     Cancel: Hemoglobin A1C ([facility]); Future          Subjective:      Patient ID: Kalina Gasca is a 48 y o  male with well controlled type 2 diabetes, diabetic neuropathy, CAD s/p HALI and hyperlipidemia who is here today for annual physical      He saw Dr Kamini Gupta in March as new patient  A1c at that visit was 5 4  his insulin was d/c'd at that visit  He was referred to cardiology for f/u on his CAD  No appt scheduled  He has not had colonoscopy  He declines this  Is willing to do cologuard  Is overdue for diabetic eye exam and foot exam  Was given referral to both ophthalmology and podiatry at last office visit in March  He did not get labs that were ordered at his last visit (including urine microalbumin) done  He declines pneumovax  Declines HIV screening  Has diabetic neuropathy  Feels like he is walking on glass  Had tried gabapentin with no relief  States that he currently uses marijuana "heavily" for his neuropathic pain  HPI    The following portions of the patient's history were reviewed and updated as appropriate:   He  has a past medical history of Acute ST segment elevation myocardial infarction ([facility] [address] ), Hyperlipidemia, Myocardial infarction ([facility] [address] ), Neuropathy, Nocturia, Type II diabetes mellitus ([facility] [address] ), and Vitamin D deficiency  He  has a past surgical history that includes Other surgical history; Cardiac catheterization (1/60/20); and Coronary angioplasty with stent (01/06/2020)  He  reports that he quit smoking about 2 months ago  His smoking use included cigarettes and cigars   He has a 10 00 pack-year smoking history  He has never used smokeless tobacco  He reports previous alcohol use  He reports current drug use  Drug: Marijuana  Current Outpatient Medications on File Prior to Visit   Medication Sig    atorvastatin (LIPITOR) 80 mg tablet TAKE 1 TABLET BY MOUTH DAILY WITH DINNER    Brilinta 90 MG TAKE 1 TABLET (90 MG TOTAL) BY MOUTH EVERY 12 (TWELVE) HOURS    metoprolol tartrate (LOPRESSOR) 25 mg tablet TAKE 1 TABLET (25 MG TOTAL) BY MOUTH EVERY 12 (TWELVE) HOURS    nitroglycerin (NITROSTAT) 0 4 mg SL tablet Place 1 tablet (0 4 mg total) under the tongue every 5 (five) minutes as needed for chest pain    [DISCONTINUED] aspirin 81 mg chewable tablet Chew 1 tablet (81 mg total) daily    [DISCONTINUED] metFORMIN (GLUCOPHAGE) 1000 MG tablet Take 1 tablet by mouth 2 (two) times a day    [DISCONTINUED] BD Insulin Syringe U/F 31G X 5/16" 0 5 ML MISC 4 (four) times a day     No current facility-administered medications on file prior to visit  He has No Known Allergies       Review of Systems   Constitutional: Negative for chills, fatigue, fever and unexpected weight change  Eyes: Positive for visual disturbance  Respiratory: Negative for cough, shortness of breath and wheezing  Cardiovascular: Negative for chest pain, palpitations and leg swelling  Gastrointestinal: Negative for constipation, diarrhea, nausea and vomiting  Endocrine: Negative for polydipsia, polyphagia and polyuria  Genitourinary: Negative for enuresis, frequency, hematuria and urgency  Musculoskeletal: Positive for arthralgias (left shoulder pain)  Skin: Negative for rash  Neurological: Negative for dizziness, light-headedness and headaches  Neuropathic pain in feet   Psychiatric/Behavioral: Negative for dysphoric mood and sleep disturbance  The patient is not nervous/anxious            Objective:      /90 (BP Location: Left arm, Patient Position: Sitting, Cuff Size: Large)   Pulse 73   Temp 97 9 °F (36 6 °C) (Temporal)   Resp 16   Ht 6' (1 829 m)   Wt 99 2 kg (218 lb 9 6 oz)   SpO2 98%   BMI 29 65 kg/m²          Physical Exam  Vitals and nursing note reviewed  Constitutional:       General: He is not in acute distress  Appearance: Normal appearance  He is not ill-appearing, toxic-appearing or diaphoretic  HENT:      Head: Normocephalic and atraumatic  Right Ear: Tympanic membrane normal       Left Ear: Tympanic membrane normal       Nose: Nose normal       Mouth/Throat:      Mouth: Mucous membranes are moist       Pharynx: No oropharyngeal exudate or posterior oropharyngeal erythema  Eyes:      General:         Right eye: No discharge  Left eye: No discharge  Conjunctiva/sclera: Conjunctivae normal       Pupils: Pupils are equal, round, and reactive to light  Cardiovascular:      Rate and Rhythm: Normal rate and regular rhythm  Pulses: Normal pulses  Heart sounds: No murmur heard  Pulmonary:      Effort: Pulmonary effort is normal       Breath sounds: Normal breath sounds  Abdominal:      General: Bowel sounds are normal       Palpations: Abdomen is soft  There is no mass  Tenderness: There is no abdominal tenderness  There is no guarding  Musculoskeletal:         General: Normal range of motion  Cervical back: Normal range of motion  Right lower leg: No edema  Left lower leg: No edema  Lymphadenopathy:      Cervical: No cervical adenopathy  Skin:     General: Skin is warm  Findings: No rash  Neurological:      General: No focal deficit present  Mental Status: He is alert     Psychiatric:         Mood and Affect: Mood normal

## 2021-07-14 ENCOUNTER — TELEPHONE (OUTPATIENT)
Dept: FAMILY MEDICINE CLINIC | Facility: CLINIC | Age: 56
End: 2021-07-14

## 2021-07-14 NOTE — TELEPHONE ENCOUNTER
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

## 2021-07-14 NOTE — TELEPHONE ENCOUNTER
Patient called requesting to have form filled out for 's license exam  States he just had ov on 6/16/21 and wants to know if he can just have form filled out or does he need another office visit? Please call patient to let him know

## 2021-07-14 NOTE — TELEPHONE ENCOUNTER
Patient seen for PE in June  Normally would be able to just complete his  physical form  He, however, had drug eluting stent placed in January of 2020 and has not followed up with cardiology  Would need to see cardiology in f/u before I can complete his  physical form

## 2021-08-20 DIAGNOSIS — I25.10 CORONARY ARTERY DISEASE INVOLVING NATIVE CORONARY ARTERY OF NATIVE HEART WITHOUT ANGINA PECTORIS: Primary | ICD-10-CM

## 2021-08-20 DIAGNOSIS — I21.3 STEMI (ST ELEVATION MYOCARDIAL INFARCTION) (HCC): ICD-10-CM

## 2021-08-20 NOTE — TELEPHONE ENCOUNTER
Spoke to pt and advised him about refill request  He had an appt with cardiology and they changed the bus schedule and was unable to make his appt  He is nervous about going out of the house with COVID and taking public transportation  I did make him aware [facility] provides transportation for pt   When he calls to schedule he just needs to let the office know he needs it and they can set it up for him  John Arrieta

## 2021-08-20 NOTE — TELEPHONE ENCOUNTER
Pt is s/p HALI in January of 2020  Still on brillinta 90 bid  I first saw this patient on 6/16/21 and advised him that he needed to see cardiology in consultation  Referral was placed  Note on referral states "Referral Worked in last Six months "    Not sure what that means, but in any case, patient has NOT scheduled with cardiology as recommended  Since recommendation is for decrease in dose after first year to 60 mg bid, I will send rx refill but at 60 mg dose  Please call him to have him schedule appt with cardiology  Thanks

## 2021-08-29 NOTE — PROGRESS NOTES
Cardiology   Follow Up   Office Visit Note  Romeo Knowles   48 y o    male   MRN: [id number]  [address]  [address] [place] [address] Margot Michele  [id number] [phone]    PCP: Sushil Woodard DO  Cardiologist: will be Dr Max Bowers                Summary of recommendations  Smoking cessation  The 1 800 quit now phone number was provided  Heart healthy diet  Educational information provided  Await updated lasb, ordered by his PCP   will get an updated echocardiogram  Follow up will be scheduled with Dr Marcus Lei (new pt) 6-8 weeks       Assessment/plan  CAD; S/P anterior STEMI, S/P PCI with 2 overlapping HALI prLAD Jan 2020  --on aspirin, Brilinta 60 mg Q 12, high-intensity statin, beta-blocker  Tobacco abuse-cessation strongly advised  Smokes 1 cigar a day  Denies cigarettes  Hyperlipidemia, on atorvastatin 80 mg daily  --Lipid panel 1/7/20:  LDL 61, non HDL 69  Diet heart healthy diet recommended  Follow-up lipid profile is pending  Type 2 diabetes mellitus  HgA1c 5 9 June 2021   On insulin and metformin  Marijuana use daily  Used since 16 yo  COVID 19 vaccine 5/19 and 6/10/21  Cardiac testing  --TTE 1/6/2020  normal LV size and function, possible mild anteroseptal hypokinesis, LVH, normal RV size and function, PASP 35-40 mm Hg    --cardiac catheterization 1/6/20  Left main: Normal   LAD: The vessel was normal sized  There was a 90% proximal stenosis  This was the culprit for the patient's anterior STEMI  The diagonal branches were free of obstructive disease  Circumflex: The vessel was normal sized and gave rise to two OM brancvhes  There were no significant lesions  RCA: The vessel was normal sized and dominant, giving rise to the PDA and a posterolateral branch  No atherosclerosis was seen  -->Single vessel disease, with a 90% proximal LAD stenosis representing the culprit for the patient's anterior STEMI    Successful IVUS-guided PCI, proximal LAD, with placement of two overlapping drug-eluting stents  Mildly-moderately elevated LVEDP            HPI  Aaron Ruffin is a 48 yo male with diabetes, tobacco abuse and marijuana use  He presented to [facility] as a pre-hospital MI alert on 1/6/20  That morning he went to the gym and upon returning home he developed 10/10 midsternal substernal chest pain and abdominal pain with radiation to his both his arms, back and abdomen with associated dyspnea  His EKG on arrival showed anterior ST elevation with reciprocal ST changes in the inferior leads  He was taken emergently for coronary angiography  This demonstrated a 90% proximal LAD stenosis  He underwent PCI and deployment of 2 overlapping drug-eluting stents with a good result  An echocardiogram demonstrated preserved LV function  He was placed on dual antiplatelet therapy with aspirin and Brilinta  He was placed on a high-intensity statin  He was advised complete tobacco cessation  Reports he only smokes cigars every other day to help increase the affects of marijuana that he smokes regularly  He is agreeable to stopping smoking cigars but desires to continue marijuana use  His echocardiogram showed preserved LV function possible mild anterior septal hypokinesis and left ventricular hypertrophy  RV size was normal     8/30/21   General cardiology follow-up  This is his 1st office visit since January 2020 when he presented with an anterior MI and received overlapping2 drug-eluting stents to his  Proximal LAD  He has transportation issues  He has no car  He tries to protect himself given the COVID-19 pandemic  He does not like to use public transportation with the bus, etc     Reports where he lives most people are not masking  He did receive the  PfizerCOVID-19 vaccine himself, May 19 and Yulissa 10  Today, he received a ride from [place]  Unfortunately, they were late  He need to call alternative transportation    Our visit was abbreviated, given this   He is on disability, since around 2007  He works out regularly, at home  He does weights, and has a lot of lifting equipment  He does ride the stationary bike, "for fun"  Sometimes this can be for 10 minutes or 30 minutes or 2 hours  With his usual activity, he denies chest pain, shortness of breath, palpitations lightheadedness or dizziness  He currently has a broken toe on his left foot    He continues to smoke marijuana, daily  He tells me he has chronic lower extremity neuropathy from his diabetes and this helps  He smokes 1 cigar a day  He denies any cigarette use  He is compliant with his medications  He has had refills, per his PCP  For now, he will remain on Brilinta 60 mg b i d  along with his aspirin  Will get an updated echocardiogram   He will return in a short interval, to meet an office cardiologist for the 1st time  He has a full set of labs requested, by his PCP which he anticipates getting very soon  His EKG today in the office showed sinus rhythm 68 beats per minute with normal intervals  Blood pressure by me 132/90 in the left arm  He tells me it was elevated because he was angry about his transportation issues  Will continue to monitor  He does not have equipment at home      Assessment  Diagnoses and all orders for this visit:    Coronary artery disease involving native coronary artery of native heart without angina pectoris    Mixed hyperlipidemia    History of placement of stent in LAD coronary artery    History of acute anterior wall MI    Tobacco abuse    Essential hypertension          Past Medical History:   Diagnosis Date    [facility]     Hyperlipidemia     [facility]     Neuropathy     Nocturia     Type II diabetes mellitus (HCC)     Vitamin D deficiency        Review of Systems   Constitutional: Negative for chills     Cardiovascular: Negative for chest pain, claudication, cyanosis, dyspnea on exertion, irregular heartbeat, leg swelling, near-syncope, orthopnea, palpitations, paroxysmal nocturnal dyspnea and syncope  Respiratory: Negative for cough and shortness of breath  Gastrointestinal: Negative for heartburn and nausea  Neurological: Positive for numbness  Negative for dizziness, focal weakness, headaches, light-headedness and weakness  Reports bilateral lower extremity neuropathy   All other systems reviewed and are negative  No Known Allergies        Current Outpatient Medications:     aspirin 81 mg chewable tablet, Chew 1 tablet (81 mg total) daily, Disp: 90 tablet, Rfl: 1    atorvastatin (LIPITOR) 80 mg tablet, TAKE 1 TABLET BY MOUTH DAILY WITH DINNER, Disp: 90 tablet, Rfl: 1    metFORMIN (GLUCOPHAGE) 1000 MG tablet, Take 1 tablet (1,000 mg total) by mouth 2 (two) times a day, Disp: 180 tablet, Rfl: 3    metoprolol tartrate (LOPRESSOR) 25 mg tablet, TAKE 1 TABLET (25 MG TOTAL) BY MOUTH EVERY 12 (TWELVE) HOURS, Disp: 60 tablet, Rfl: 2    nitroglycerin (NITROSTAT) 0 4 mg SL tablet, Place 1 tablet (0 4 mg total) under the tongue every 5 (five) minutes as needed for chest pain, Disp: 90 tablet, Rfl: 0    ticagrelor 60 MG, Take 1 tablet (60 mg total) by mouth every 12 (twelve) hours, Disp: 60 tablet, Rfl: 0        Social History     Socioeconomic History    Marital status: Single     Spouse name: Not on file    Number of children: Not on file    Years of education: Not on file    Highest education level: Not on file   Occupational History    Not on file   Tobacco Use    Smoking status: Former Smoker     Packs/day: 1 00     Years: 10 00     Pack years: 10 00     Types: Cigarettes, Cigars     Quit date: 2021     Years since quittin 4    Smokeless tobacco: Never Used   Vaping Use    Vaping Use: Never used   Substance and Sexual Activity    Alcohol use: Not Currently    Drug use: Yes     Types: Marijuana     Comment: "heavily"    Sexual activity: Yes Partners: Female   Other Topics Concern    Not on file   Social History Narrative    Not working  States that he is on SSI for his diabetes? ? Social Determinants of Health     Financial Resource Strain:     Difficulty of Paying Living Expenses:    Food Insecurity:     Worried About Running Out of Food in the Last Year:     [address] in the Last Year:    Transportation Needs:     Lack of Transportation (Medical):  Lack of Transportation (Non-Medical):    Physical Activity:     Days of Exercise per Week:     Minutes of Exercise per Session:    Stress:     Feeling of Stress :    Social Connections:     Frequency of Communication with Friends and Family:     Frequency of Social Gatherings with Friends and Family:     Attends Church Services:     Active Member of Clubs or Organizations:     Attends Club or Organization Meetings:     Marital Status:    Intimate Partner Violence:     Fear of Current or Ex-Partner:     Emotionally Abused:     Physically Abused:     Sexually Abused:        Family History   Problem Relation Age of Onset    Diabetes Mother     Pancreatic cancer Mother     Alcohol abuse Father     No Known Problems Sister     No Known Problems Brother     Mental illness Daughter     No Known Problems Brother     No Known Problems Sister     No Known Problems Sister        Physical Exam  Vitals and nursing note reviewed  Constitutional:       General: He is not in acute distress  HENT:      Head: Normocephalic and atraumatic  Eyes:      Conjunctiva/sclera: Conjunctivae normal    Cardiovascular:      Rate and Rhythm: Normal rate and regular rhythm  Pulses: Intact distal pulses  Heart sounds: Normal heart sounds  Pulmonary:      Effort: Pulmonary effort is normal       Breath sounds: Normal breath sounds  Abdominal:      General: Bowel sounds are normal       Palpations: Abdomen is soft  Musculoskeletal:         General: Normal range of motion  Cervical back: Normal range of motion and neck supple  Skin:     General: Skin is warm and dry  Neurological:      Mental Status: He is alert and oriented to person, place, and time  Vitals: There were no vitals taken for this visit  Wt Readings from Last 3 Encounters:   21 99 2 kg (218 lb 9 6 oz)   21 103 kg (226 lb)   10/27/20 100 kg (221 lb)         Labs & Results:  Lab Results   Component Value Date    WBC 12 33 (H) 2020    HGB 14 0 2020    HCT 42 2 2020    MCV 90 2020     2020     No results found for: BNP  No components found for: CHEM  Troponin I   Date Value Ref Range Status   2020 0 12 (H) <=0 04 ng/mL Final     Comment:       Siemens Chemistry analyzer 99% cutoff is > 0 04 ng/mL in network labs     o cTnI 99% cutoff is useful only when applied to patients in the clinical setting of myocardial ischemia   o cTnI 99% cutoff should be interpreted in the context of clinical history, ECG findings and possibly cardiac imaging to establish correct diagnosis  o cTnI 99% cutoff may be suggestive but clearly not indicative of a coronary event without the clinical setting of myocardial ischemia      Results indicate test should be repeated on new specimen collected within 4-6 hours of the original     Results for orders placed during the hospital encounter of 20    Echo complete with contrast if indicated    Narrative  [place] [address], [address] [facility]  [phone]    Transthoracic Echocardiogram  2D, M-mode, Doppler, and Color Doppler    Study date:  2020    Patient: Felix Raman  MR number: [id number]  Account number: [de-identified]  : 1965  Age: 49 years  Gender: Male  Status: Inpatient  Location: Bedside  Height: 72 in  Weight: 223 5 lb  BP: 145/ 92 mmHg    Indications: MI    Diagnoses: I21 3 - ST elevation (STEMI) myocardial infarction of unspecified site    Sonographer:  Simone Lu [facility]  Interpreting Physician:  Michael Parham MD  Referring Physician:  Michael Parham MD  Group:  [facility]    SUMMARY    LEFT VENTRICLE:  Systolic function was normal   There were no definite regional wall motion abnormalities  Possible mild anteroseptal hypokinesis  Wall thickness was increased  Hypertrophy was noted  Left ventricular diastolic function parameters were normal     TRICUSPID VALVE:  Estimated peak PA pressure was 35 - 40 mmHg  HISTORY: PRIOR HISTORY: DM, Smoker    PROCEDURE: The procedure was performed at the bedside  This was a routine study  The transthoracic approach was used  The study included complete 2D imaging, M-mode, complete spectral Doppler, and color Doppler  The heart rate was 67 bpm,  at the start of the study  Images were obtained from the parasternal, apical, subcostal, and suprasternal notch acoustic windows  Image quality was adequate  LEFT VENTRICLE: Size was normal  Systolic function was normal  There were no definite regional wall motion abnormalities  Possible mild anteroseptal hypokinesis  Wall thickness was increased  Hypertrophy was noted  DOPPLER: The ratio of  early ventricular filling to atrial contraction velocities was within the normal range  Left ventricular diastolic function parameters were normal     RIGHT VENTRICLE: The size was normal  Systolic function was normal     LEFT ATRIUM: The atrium was mildly dilated  RIGHT ATRIUM: The atrium was mildly dilated  MITRAL VALVE: Valve structure was normal  There was normal leaflet separation  DOPPLER: There was no evidence for stenosis  There was trace regurgitation  AORTIC VALVE: The valve was probably trileaflet  Leaflets exhibited normal thickness and normal cuspal separation  DOPPLER: There was no evidence for stenosis  There was no significant regurgitation  TRICUSPID VALVE: The valve structure was normal  There was normal leaflet separation   DOPPLER: There was no evidence for stenosis  There was mild regurgitation  Estimated peak PA pressure was 35 - 40 mmHg  PULMONIC VALVE: Leaflets exhibited normal thickness, no calcification, and normal cuspal separation  DOPPLER: The transpulmonic velocity was within the normal range  There was trace regurgitation  PERICARDIUM: There was no pericardial effusion  The pericardium was normal in appearance  AORTA: The root exhibited normal size  SYSTEM MEASUREMENT TABLES    2D  %FS: 36 46 %  Ao Diam: 2 8 cm  EDV(Teich): 126 07 ml  EF(Teich): 65 86 %  ESV(Teich): 43 03 ml  IVSd: 1 26 cm  LA Area: 18 92 cm2  LA Diam: 3 24 cm  LVEDV MOD A4C: 105 93 ml  LVEF MOD A4C: 62 01 %  LVESV MOD A4C: 40 24 ml  LVIDd: 5 14 cm  LVIDs: 3 27 cm  LVLd A4C: 9 15 cm  LVLs A4C: 7 25 cm  LVPWd: 1 cm  RA Area: 20 84 cm2  RVIDd: 3 43 cm  SV MOD A4C: 65 68 ml  SV(Teich): 83 04 ml    CW  TR MaxP 16 mmHg  TR Vmax: 2 92 m/s    PW  E': 0 09 m/s  E/E': 11 08  MV A Jg: 0 81 m/s  MV Dec [place]: 5 31 m/s2  MV DecT: 180 64 ms  MV E Jg: 0 96 m/s  MV E/A Ratio: 1 19  MV PHT: 52 39 ms  MVA By PHT: 4 2 cm2    [employer] Accredited Echocardiography Laboratory    Prepared and electronically signed by    El Branham MD  Signed 2020 15:56:52    No results found for this or any previous visit  This note was completed in part utilizing m-Bad[employer] fluency direct voice recognition software  Grammatical errors, random word insertion, spelling mistakes, and incomplete sentences may be an occasional consequence of the system secondary to software limitations, ambient noise and hardware issues  At the time of dictation, efforts were made to edit, clarify and /or correct errors  Please read the chart carefully and recognize, using context, where substitutions have occurred    If you have any questions or concerns about the context, text or information contained within the body of this dictation, please contact myself, the provider, for further clarification

## 2021-08-30 ENCOUNTER — OFFICE VISIT (OUTPATIENT)
Dept: CARDIOLOGY CLINIC | Facility: CLINIC | Age: 56
End: 2021-08-30
Payer: COMMERCIAL

## 2021-08-30 VITALS
WEIGHT: 220 LBS | BODY MASS INDEX: 29.8 KG/M2 | SYSTOLIC BLOOD PRESSURE: 132 MMHG | HEIGHT: 72 IN | OXYGEN SATURATION: 99 % | DIASTOLIC BLOOD PRESSURE: 90 MMHG | HEART RATE: 80 BPM

## 2021-08-30 DIAGNOSIS — I25.10 CORONARY ARTERY DISEASE INVOLVING NATIVE CORONARY ARTERY OF NATIVE HEART WITHOUT ANGINA PECTORIS: Primary | ICD-10-CM

## 2021-08-30 DIAGNOSIS — E78.2 MIXED HYPERLIPIDEMIA: ICD-10-CM

## 2021-08-30 DIAGNOSIS — Z72.0 TOBACCO ABUSE: ICD-10-CM

## 2021-08-30 DIAGNOSIS — Z95.5 HISTORY OF PLACEMENT OF STENT IN LAD CORONARY ARTERY: ICD-10-CM

## 2021-08-30 DIAGNOSIS — I25.2 HISTORY OF ACUTE ANTERIOR WALL MI: ICD-10-CM

## 2021-08-30 DIAGNOSIS — I10 ESSENTIAL HYPERTENSION: ICD-10-CM

## 2021-08-30 PROCEDURE — 3080F DIAST BP >= 90 MM HG: CPT | Performed by: NURSE PRACTITIONER

## 2021-08-30 PROCEDURE — 3075F SYST BP GE 130 - 139MM HG: CPT | Performed by: NURSE PRACTITIONER

## 2021-08-30 PROCEDURE — 93000 ELECTROCARDIOGRAM COMPLETE: CPT | Performed by: NURSE PRACTITIONER

## 2021-08-30 PROCEDURE — 99214 OFFICE O/P EST MOD 30 MIN: CPT | Performed by: NURSE PRACTITIONER

## 2021-08-30 NOTE — PATIENT INSTRUCTIONS
Mediterranean Diet   AMBULATORY CARE:   A Mediterranean diet  is a meal plan that includes foods that are commonly eaten in countries that border the [place]  This meal plan may provide several health benefits  These include losing or maintaining weight, and decreasing blood pressure, blood sugar, and cholesterol levels  It may also help protect against certain health conditions such as heart disease, cancer, type 2 diabetes, and Alzheimer disease  Work with a dietitian to develop a meal plan that is right for you  Foods to include in the [address] diet:   · Include fruits and vegetables in each meal   Eat a variety of fresh fruits and vegetables  · Choose whole grains every day  These foods include whole-grain breads, pastas, and cereals  It also includes brown rice, quinoa, and millet  · Use unsaturated fats instead of saturated fats  Cook with olive or canola oil  Limit saturated fats, such as butter, margarine, and shortening  Saturated fat is an unhealthy fat that can increase your cholesterol levels  · Choose plant foods, poultry, and fish as your main sources of protein  ? Eat plant-based foods that provide protein,  such as lentils, beans, chickpeas, nuts, and seeds  Choose mostly plant-based foods in place of meat on most days of the week  ? Eat protein foods high in omega-3 fats  Fish high in omega-3 fats include salmon, trout, and tuna  Include these types of fish 1 or 2 times each week  Limit fish high in mercury, such as shark, swordfish, tilefish, and ron mackerel  Omega-3 fats are also found in walnuts and flaxseed  ? Choose poultry (chicken or turkey)  without skin instead of red meat  Red meat is high in saturated fat  Limit eggs and high-fat meats, such as olson, sausage, and hot dogs  · Choose low-fat dairy foods  such as nonfat or 1% milk, or low-fat almond, cashew, or soy milk   Other examples include low-fat cheese, yogurt, and cottage cheese  · Limit sweets  Limit your intake of high-sugar foods, such as soda, desserts, and candy  · Talk to your healthcare provider about alcohol  Studies have shown that moderate intake of wine may reduce the risk of heart disease  A moderate amount of wine is 1 serving for women and men 65 years and older each day  Two servings is recommended for men 24to 59years of age each day  A serving of wine is 5 ounces  Other things you need to know if you follow the Mediterranean diet:   · Include foods high in iron and vitamin C   Plant-based foods that are high in iron include spinach, beans, tofu, and artichoke  Eat a serving of vitamin C with any iron-rich food to help your body absorb more iron  Examples include oranges, strawberries, cantaloupe, broccoli, and yellow peppers  · Get regular physical activity  The Mediterranean diet will have the most benefit if you get regular physical activity  Get 30 minutes of physical activity at least 5 days a week  Choose physical activities that increase your heart rate  Examples include walking, hiking, swimming, and riding a bike  Ask your healthcare provider about the best exercise plan for you  © Copyright [employer] 2021 Information is for End User's use only and may not be sold, redistributed or otherwise used for commercial purposes  All illustrations and images included in CareNotes® are the copyrighted property of [employer]  or Chris Galindo  The above information is an  only  It is not intended as medical advice for individual conditions or treatments  Talk to your doctor, nurse or pharmacist before following any medical regimen to see if it is safe and effective for you

## 2021-08-30 NOTE — LETTER
August 30, 2021     Silvana Davidson, [address] [facility]  [address]  [address] [facility]    Patient: Sinai Estrella   YOB: 1965   Date of Visit: 8/30/2021       Dear Dr Dinah Laurent:    Thank you for referring Sinai Estrella to me for evaluation  Below are my notes for this consultation  If you have questions, please do not hesitate to call me  I look forward to following your patient along with you  Sincerely,        ERIC Rogers        CC: MD Lynn Mart, [address]  8/30/2021  8:37 AM  Sign when Signing Visit  Cardiology   Follow Up   Office Visit Note  Sinai Estrella   48 y o    male   MRN: [id number]  [address]  [address]  [facility] [address] [facility] [address] [phone]    PCP: Silvana Davidson DO  Cardiologist: will be Dr Nakia Galarza                Summary of recommendations  Smoking cessation  The 1 800 quit now phone number was provided  Heart healthy diet  Educational information provided  Await updated lasb, ordered by his PCP   will get an updated echocardiogram  Follow up will be scheduled with Dr Jose C Ramirez (new pt) 6-8 weeks       Assessment/plan  CAD; S/P anterior STEMI, S/P PCI with 2 overlapping HALI prLAD Jan 2020  --on aspirin, Brilinta 60 mg Q 12, high-intensity statin, beta-blocker  Tobacco abuse-cessation strongly advised  Smokes 1 cigar a day  Denies cigarettes  Hyperlipidemia, on atorvastatin 80 mg daily  --Lipid panel 1/7/20:  LDL 61, non HDL 69  Diet heart healthy diet recommended  Follow-up lipid profile is pending  Type 2 diabetes mellitus  HgA1c 5 9 June 2021   On insulin and metformin  Marijuana use daily  Used since 16 yo  COVID 19 vaccine 5/19 and 6/10/21  Cardiac testing  --TTE 1/6/2020  normal LV size and function, possible mild anteroseptal hypokinesis, LVH, normal RV size and function, PASP 35-40 mm Hg    --cardiac catheterization 1/6/20  Left main: Normal   LAD: The vessel was normal sized  There was a 90% proximal stenosis  This was the culprit for the patient's anterior STEMI  The diagonal branches were free of obstructive disease  Circumflex: The vessel was normal sized and gave rise to two OM brancvhes  There were no significant lesions  RCA: The vessel was normal sized and dominant, giving rise to the PDA and a posterolateral branch  No atherosclerosis was seen  -->Single vessel disease, with a 90% proximal LAD stenosis representing the culprit for the patient's anterior STEMI  Successful IVUS-guided PCI, proximal LAD, with placement of two overlapping drug-eluting stents  Mildly-moderately elevated LVEDP            HPI  Dari Talavera is a 50 yo male with diabetes, tobacco abuse and marijuana use  He presented to [facility] as a pre-hospital MI alert on 1/6/20  That morning he went to the gym and upon returning home he developed 10/10 midsternal substernal chest pain and abdominal pain with radiation to his both his arms, back and abdomen with associated dyspnea  His EKG on arrival showed anterior ST elevation with reciprocal ST changes in the inferior leads  He was taken emergently for coronary angiography  This demonstrated a 90% proximal LAD stenosis  He underwent PCI and deployment of 2 overlapping drug-eluting stents with a good result  An echocardiogram demonstrated preserved LV function  He was placed on dual antiplatelet therapy with aspirin and Brilinta  He was placed on a high-intensity statin  He was advised complete tobacco cessation  Reports he only smokes cigars every other day to help increase the affects of marijuana that he smokes regularly  He is agreeable to stopping smoking cigars but desires to continue marijuana use  His echocardiogram showed preserved LV function possible mild anterior septal hypokinesis and left ventricular hypertrophy  RV size was normal     8/30/21   General cardiology follow-up      This is his 1st office visit since January 2020 when he presented with an anterior MI and received overlapping2 drug-eluting stents to his  Proximal LAD  He has transportation issues  He has no car  He tries to protect himself given the COVID-19 pandemic  He does not like to use public transportation with the bus, etc     Reports where he lives most people are not masking  He did receive the  PfizerCOVID-19 vaccine himself, May 19 and Yulissa 10  Today, he received a ride from [place]  Unfortunately, they were late  He need to call alternative transportation  Our visit was abbreviated, given this  He is on disability, since around 2007  He works out regularly, at home  He does weights, and has a lot of lifting equipment  He does ride the stationary bike, "for fun"  Sometimes this can be for 10 minutes or 30 minutes or 2 hours  With his usual activity, he denies chest pain, shortness of breath, palpitations lightheadedness or dizziness  He currently has a broken toe on his left foot    He continues to smoke marijuana, daily  He tells me he has chronic lower extremity neuropathy from his diabetes and this helps  He smokes 1 cigar a day  He denies any cigarette use  He is compliant with his medications  He has had refills, per his PCP  For now, he will remain on Brilinta 60 mg b i d  along with his aspirin  Will get an updated echocardiogram   He will return in a short interval, to meet an office cardiologist for the 1st time  He has a full set of labs requested, by his PCP which he anticipates getting very soon  His EKG today in the office showed sinus rhythm 68 beats per minute with normal intervals  Blood pressure by me 132/90 in the left arm  He tells me it was elevated because he was angry about his transportation issues  Will continue to monitor    He does not have equipment at home      Assessment  Diagnoses and all orders for this visit:    Coronary artery disease involving native coronary artery of native heart without angina pectoris    Mixed hyperlipidemia    History of placement of stent in LAD coronary artery    History of acute anterior wall MI    Tobacco abuse    Essential hypertension          Past Medical History:   Diagnosis Date    Acute ST segment elevation myocardial infarction ([facility] [address] )     Hyperlipidemia     Myocardial infarction (HCC)     Neuropathy     Nocturia     Type II diabetes mellitus (HCC)     Vitamin D deficiency        Review of Systems   Constitutional: Negative for chills  Cardiovascular: Negative for chest pain, claudication, cyanosis, dyspnea on exertion, irregular heartbeat, leg swelling, near-syncope, orthopnea, palpitations, paroxysmal nocturnal dyspnea and syncope  Respiratory: Negative for cough and shortness of breath  Gastrointestinal: Negative for heartburn and nausea  Neurological: Positive for numbness  Negative for dizziness, focal weakness, headaches, light-headedness and weakness  Reports bilateral lower extremity neuropathy   All other systems reviewed and are negative  No Known Allergies        Current Outpatient Medications:     aspirin 81 mg chewable tablet, Chew 1 tablet (81 mg total) daily, Disp: 90 tablet, Rfl: 1    atorvastatin (LIPITOR) 80 mg tablet, TAKE 1 TABLET BY MOUTH DAILY WITH DINNER, Disp: 90 tablet, Rfl: 1    metFORMIN (GLUCOPHAGE) 1000 MG tablet, Take 1 tablet (1,000 mg total) by mouth 2 (two) times a day, Disp: 180 tablet, Rfl: 3    metoprolol tartrate (LOPRESSOR) 25 mg tablet, TAKE 1 TABLET (25 MG TOTAL) BY MOUTH EVERY 12 (TWELVE) HOURS, Disp: 60 tablet, Rfl: 2    nitroglycerin (NITROSTAT) 0 4 mg SL tablet, Place 1 tablet (0 4 mg total) under the tongue every 5 (five) minutes as needed for chest pain, Disp: 90 tablet, Rfl: 0    ticagrelor 60 MG, Take 1 tablet (60 mg total) by mouth every 12 (twelve) hours, Disp: 60 tablet, Rfl: 0        Social History     Socioeconomic History    Marital status: Single     Spouse name: Not on file    Number of children: Not on file    Years of education: Not on file    Highest education level: Not on file   Occupational History    Not on file   Tobacco Use    Smoking status: Former Smoker     Packs/day: 1 00     Years: 10 00     Pack years: 10 00     Types: Cigarettes, Cigars     Quit date: 2021     Years since quittin 4    Smokeless tobacco: Never Used   Vaping Use    Vaping Use: Never used   Substance and Sexual Activity    Alcohol use: Not Currently    Drug use: Yes     Types: Marijuana     Comment: "heavily"    Sexual activity: Yes     Partners: Female   Other Topics Concern    Not on file   Social History Narrative    Not working  States that he is on SSI for his diabetes? ? Social Determinants of Health     Financial Resource Strain:     Difficulty of Paying Living Expenses:    Food Insecurity:     Worried About Running Out of Food in the Last Year:     [address] in the Last Year:    Transportation Needs:     Lack of Transportation (Medical):      Lack of Transportation (Non-Medical):    Physical Activity:     Days of Exercise per Week:     Minutes of Exercise per Session:    Stress:     Feeling of Stress :    Social Connections:     Frequency of Communication with Friends and Family:     Frequency of Social Gatherings with Friends and Family:     Attends [employer] Services:     Active Member of Clubs or Organizations:     Attends Club or Organization Meetings:     Marital Status:    Intimate Partner Violence:     Fear of Current or Ex-Partner:     Emotionally Abused:     Physically Abused:     Sexually Abused:        Family History   Problem Relation Age of Onset    Diabetes Mother     Pancreatic cancer Mother     Alcohol abuse Father     No Known Problems Sister     No Known Problems Brother     Mental illness Daughter     No Known Problems Brother     No Known Problems Sister     No Known Problems Sister        Physical Exam  Vitals and nursing note reviewed  Constitutional:       General: He is not in acute distress  HENT:      Head: Normocephalic and atraumatic  Eyes:      Conjunctiva/sclera: Conjunctivae normal    Cardiovascular:      Rate and Rhythm: Normal rate and regular rhythm  Pulses: Intact distal pulses  Heart sounds: Normal heart sounds  Pulmonary:      Effort: Pulmonary effort is normal       Breath sounds: Normal breath sounds  Abdominal:      General: Bowel sounds are normal       Palpations: Abdomen is soft  Musculoskeletal:         General: Normal range of motion  Cervical back: Normal range of motion and neck supple  Skin:     General: Skin is warm and dry  Neurological:      Mental Status: He is alert and oriented to person, place, and time  Vitals: There were no vitals taken for this visit  Wt Readings from Last 3 Encounters:   06/16/21 99 2 kg (218 lb 9 6 oz)   03/11/21 103 kg (226 lb)   10/27/20 100 kg (221 lb)         Labs & Results:  Lab Results   Component Value Date    WBC 12 33 (H) 01/07/2020    HGB 14 0 01/07/2020    HCT 42 2 01/07/2020    MCV 90 01/07/2020     01/07/2020     No results found for: BNP  No components found for: CHEM  Troponin I   Date Value Ref Range Status   01/06/2020 0 12 (H) <=0 04 ng/mL Final     Comment:       [employer] Chemistry analyzer 99% cutoff is > 0 04 ng/mL in network labs     o cTnI 99% cutoff is useful only when applied to patients in the clinical setting of myocardial ischemia   o cTnI 99% cutoff should be interpreted in the context of clinical history, ECG findings and possibly cardiac imaging to establish correct diagnosis  o cTnI 99% cutoff may be suggestive but clearly not indicative of a coronary event without the clinical setting of myocardial ischemia      Results indicate test should be repeated on new specimen collected within 4-6 hours of the original     Results for orders placed during the hospital encounter of 01/06/20    Echo complete with contrast if indicated    Narrative  [facility], [address] [facility]  [phone]    Transthoracic Echocardiogram  2D, M-mode, Doppler, and Color Doppler    Study date:  2020    Patient: Sameer Santos  MR number: [id number]  Account number: [de-identified]  : 1965  Age: 49 years  Gender: Male  Status: Inpatient  Location: Bedside  Height: 72 in  Weight: 223 5 lb  BP: 145/ 92 mmHg    Indications: MI    Diagnoses: I21 3 - ST elevation (STEMI) myocardial infarction of unspecified site    Sonographer:  Yumiko Kiser RDCS  Interpreting Physician:  West Fuller MD  Referring Physician:  West Fuller MD  Group:  [facility]    SUMMARY    LEFT VENTRICLE:  Systolic function was normal   There were no definite regional wall motion abnormalities  Possible mild anteroseptal hypokinesis  Wall thickness was increased  Hypertrophy was noted  Left ventricular diastolic function parameters were normal     TRICUSPID VALVE:  Estimated peak PA pressure was 35 - 40 mmHg  HISTORY: PRIOR HISTORY: DM, Smoker    PROCEDURE: The procedure was performed at the bedside  This was a routine study  The transthoracic approach was used  The study included complete 2D imaging, M-mode, complete spectral Doppler, and color Doppler  The heart rate was 67 bpm,  at the start of the study  Images were obtained from the parasternal, apical, subcostal, and suprasternal notch acoustic windows  Image quality was adequate  LEFT VENTRICLE: Size was normal  Systolic function was normal  There were no definite regional wall motion abnormalities  Possible mild anteroseptal hypokinesis  Wall thickness was increased  Hypertrophy was noted  DOPPLER: The ratio of  early ventricular filling to atrial contraction velocities was within the normal range   Left ventricular diastolic function parameters were normal     RIGHT VENTRICLE: The size was normal  Systolic function was normal     LEFT ATRIUM: The atrium was mildly dilated  RIGHT ATRIUM: The atrium was mildly dilated  MITRAL VALVE: Valve structure was normal  There was normal leaflet separation  DOPPLER: There was no evidence for stenosis  There was trace regurgitation  AORTIC VALVE: The valve was probably trileaflet  Leaflets exhibited normal thickness and normal cuspal separation  DOPPLER: There was no evidence for stenosis  There was no significant regurgitation  TRICUSPID VALVE: The valve structure was normal  There was normal leaflet separation  DOPPLER: There was no evidence for stenosis  There was mild regurgitation  Estimated peak PA pressure was 35 - 40 mmHg  PULMONIC VALVE: Leaflets exhibited normal thickness, no calcification, and normal cuspal separation  DOPPLER: The transpulmonic velocity was within the normal range  There was trace regurgitation  PERICARDIUM: There was no pericardial effusion  The pericardium was normal in appearance  AORTA: The root exhibited normal size  SYSTEM MEASUREMENT TABLES    2D  %FS: 36 46 %  Ao Diam: 2 8 cm  EDV(Teich): 126 07 ml  EF(Teich): 65 86 %  ESV(Teich): 43 03 ml  IVSd: 1 26 cm  LA Area: 18 92 cm2  LA Diam: 3 24 cm  LVEDV MOD A4C: 105 93 ml  LVEF MOD A4C: 62 01 %  LVESV MOD A4C: 40 24 ml  LVIDd: 5 14 cm  LVIDs: 3 27 cm  LVLd A4C: 9 15 cm  LVLs A4C: 7 25 cm  LVPWd: 1 cm  RA Area: 20 84 cm2  RVIDd: 3 43 cm  SV MOD A4C: 65 68 ml  SV(Teich): 83 04 ml    CW  TR MaxP 16 mmHg  TR Vmax: 2 92 m/s    PW  E': 0 09 m/s  E/E': 11 08  MV A Jg: 0 81 m/s  MV Dec [place]: 5 31 m/s2  MV DecT: 180 64 ms  MV E Jg: 0 96 m/s  MV E/A Ratio: 1 19  MV PHT: 52 39 ms  MVA By PHT: 4 2 cm2    [employer] Accredited Echocardiography Laboratory    Prepared and electronically signed by    Katarina Mcgee MD  Signed 2020 15:56:52    No results found for this or any previous visit  This note was completed in part utilizing [employer] direct voice recognition software     Grammatical errors, random word insertion, spelling mistakes, and incomplete sentences may be an occasional consequence of the system secondary to software limitations, ambient noise and hardware issues  At the time of dictation, efforts were made to edit, clarify and /or correct errors  Please read the chart carefully and recognize, using context, where substitutions have occurred    If you have any questions or concerns about the context, text or information contained within the body of this dictation, please contact myself, the provider, for further clarification

## 2021-10-14 PROBLEM — F12.90 MARIJUANA USE: Status: ACTIVE | Noted: 2021-10-14

## 2021-10-21 DIAGNOSIS — I25.10 CORONARY ARTERY DISEASE INVOLVING NATIVE CORONARY ARTERY OF NATIVE HEART WITHOUT ANGINA PECTORIS: ICD-10-CM

## 2021-10-21 DIAGNOSIS — I21.3 STEMI (ST ELEVATION MYOCARDIAL INFARCTION) (HCC): ICD-10-CM

## 2021-10-27 ENCOUNTER — HOSPITAL ENCOUNTER (OUTPATIENT)
Dept: NON INVASIVE DIAGNOSTICS | Facility: CLINIC | Age: 56
Discharge: HOME | End: 2021-10-27
Payer: COMMERCIAL

## 2021-10-27 VITALS
SYSTOLIC BLOOD PRESSURE: 132 MMHG | DIASTOLIC BLOOD PRESSURE: 90 MMHG | BODY MASS INDEX: 29.8 KG/M2 | HEART RATE: 90 BPM | WEIGHT: 220 LBS | HEIGHT: 72 IN

## 2021-10-27 DIAGNOSIS — I25.10 CORONARY ARTERY DISEASE INVOLVING NATIVE CORONARY ARTERY OF NATIVE HEART WITHOUT ANGINA PECTORIS: ICD-10-CM

## 2021-10-27 LAB
AORTIC ROOT: 2.7 CM
APICAL FOUR CHAMBER EJECTION FRACTION: 71 %
AV LVOT MEAN GRADIENT: 3 MMHG
AV LVOT PEAK GRADIENT: 6 MMHG
DOP CALC LVOT PEAK VEL VTI: 26.4 CM
DOP CALC LVOT PEAK VEL: 1.22 M/S
FRACTIONAL SHORTENING: 34 % (ref 28–44)
INTERVENTRICULAR SEPTUM IN DIASTOLE (PARASTERNAL SHORT AXIS VIEW): 1 CM
LEFT INTERNAL DIMENSION IN SYSTOLE: 3.3 CM (ref 2.1–4)
LEFT VENTRICULAR INTERNAL DIMENSION IN DIASTOLE: 5 CM (ref 6.91–10.3)
LEFT VENTRICULAR POSTERIOR WALL IN END DIASTOLE: 1.1 CM
LEFT VENTRICULAR STROKE VOLUME: 73 ML
LV EF: 58 %
RIGHT VENTRICLE ID DIMENSION: 3.4 CM
SL CV LV EF: 65
SL CV PED ECHO LEFT VENTRICLE DIASTOLIC VOLUME (MOD BIPLANE) 2D: 119 ML
SL CV PED ECHO LEFT VENTRICLE SYSTOLIC VOLUME (MOD BIPLANE) 2D: 45 ML
TR PEAK VELOCITY: 2.6 M/S
TRICUSPID VALVE PEAK REGURGITATION VELOCITY: 2.63 M/S
TRICUSPID VALVE S': 1.2 CM/S
TV PEAK GRADIENT: 28 MMHG
Z-SCORE OF LEFT VENTRICULAR DIMENSION IN END SYSTOLE: -5.23

## 2021-10-27 PROCEDURE — 93306 TTE W/DOPPLER COMPLETE: CPT

## 2021-10-27 PROCEDURE — 93306 TTE W/DOPPLER COMPLETE: CPT | Performed by: INTERNAL MEDICINE

## 2021-11-19 ENCOUNTER — TELEPHONE (OUTPATIENT)
Dept: CARDIOLOGY CLINIC | Facility: CLINIC | Age: 56
End: 2021-11-19
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
## 2022-05-11 NOTE — PATIENT INSTRUCTIONS
Diabetes and Nutrition   WHAT YOU NEED TO KNOW:   Why are nutrition plans important? Nutrition plans help with healthy eating patterns that improve health  Nutrition plans and regular exercise help keep your blood sugar levels steady  They also help delay or prevent complications of diabetes, such as diabetic kidney disease  How do I create a nutrition plan? A dietitian will help you create a nutrition plan to meet your needs and your family's needs  The goal is for you to reach or maintain healthy weight, blood sugar, blood pressure, and lipid levels  You should meet with the dietitian at least 1 time each year  You will learn the following: How food affects your blood sugar levels    How to create healthy eating habits    How to make food choices based on your activity level, weight, and glucose levels    How your favorite foods may fit into your plan    Foods that contain carbohydrates (sugars and starches), including simple and complex carbohydrates    How to keep track of all carbohydrates    Correct portion sizes for each food    Changes you can make to your plan if you get pregnant or are breastfeeding    What are some tips to do until I meet with the dietitian? Do not skip meals  The goal is to keep your blood sugar level steady  Blood sugar levels may drop too low if you have received insulin and do not eat  Eat more high-fiber foods, such as fresh or frozen fruits and vegetables, whole-grain breads, and beans  Fiber helps control or lower blood sugar and cholesterol levels  Choose whole fruits instead of fruit juice as much as possible  Sugar may be added to juice, and fiber may be removed  Choose heart-healthy fats  Foods high in heart-healthy fats include olive oil, nuts, avocados, and fatty fish, such as salmon and tuna  Foods high in unhealthy fats include red meat, full-fat dairy products, and soft margarine   Unhealthy fats can increase your risk for heart disease, increase bad cholesterol, and lower good cholesterol  Choose complex carbohydrates  Foods with complex carbohydrates include brown rice, whole-grain breads and cereals, and cooked beans  Foods with simple carbohydrates include white bread, white rice, most cold cereals, and snack foods  Your plan will include the amount of carbohydrate to have at one time or in a day  Your blood sugar level can get too high if you eat too much carbohydrate at one time  Blood sugar levels do not spike as high or drop as quickly with complex carbohydrates as with simple carbohydrates  Choose complex carbohydrates whenever possible  Have less sodium (salt)  The risk for high blood pressure (BP) increases with high-sodium foods  Limit high-sodium foods, such as soy sauce, potato chips, and canned soup  Do not add salt to food you cook  Limit your use of table salt  Read labels to have no more than 2,300 milligrams of sodium in one day  Limit artificial sweeteners  These may be found in food or drinks, such as diet soft drinks or other low-calorie beverages  Artificial sweeteners are low in calories  They may help you lower your overall calories and carbohydrates  It is important not to have more calories from other foods to make up for the calories saved  Artificial sweeteners do not have any nutrition  Eat whole foods and drink water as much as possible  Your plan may include beverages with artificial sweeteners for a short time  These can help you transition from high-sugar beverages to water  Use the plate method for each meal   This method can help you eat the right amount of carbohydrates and keep your blood sugar levels under control  Draw an imaginary line down the middle of a 9-inch dinner plate  On one side, draw another line to divide that section in half  Your plate will have one large section and 2 small sections  Fill the largest section with non-starchy vegetables   These include broccoli, spinach, cucumbers, peppers, cauliflower, and tomatoes  Add a starch to one of the small sections  Starches include pasta, rice, whole-grain bread, tortillas, corn, potatoes, and beans  Add meat or another source of protein to the other small section  Examples include chicken or turkey without skin, fish, lean beef or pork, low-fat cheese, tofu, and eggs  Add dairy products or fruit next to your plate if your meal plan allows  Examples of dairy include skim or 1% milk and low-fat yogurt  If you do not drink milk or eat dairy products, you may be able to add another serving of starchy food instead  Have a low-calorie or calorie-free drink with your meal  Examples include water or unsweetened tea or coffee  What are the risks of alcohol? Alcohol can cause hypoglycemia (very low blood sugar level), especially if you use insulin  Alcohol can cause high blood sugar and BP levels, and weight gain if you drink too much  Women 21 years or older and men 72 years or older should limit alcohol to 1 drink a day  Men aged 24 to 59 years should limit alcohol to 2 drinks a day  A drink of alcohol is 12 ounces of beer, 5 ounces of wine, or 1½ ounces of liquor  Hypoglycemia can happen hours after you drink alcohol  Check your blood sugar level for several hours after you drink alcohol  Have a source of fast-acting carbohydrates with you in case your level goes too low  You need immediate care if you have signs or symptoms of hypoglycemia, such as sweating, confusion, or fainting  Why is it important to maintain a healthy weight? A healthy weight can help you control your diabetes  You can maintain a healthy weight with a nutrition plan and exercise  Ask your healthcare provider how much you should weigh  Ask him or her to help you create a weight loss plan if you are overweight  Together you can set weight loss and maintenance goals    Call your local emergency number [phone] in the [address]) if:   You have any of the following signs of a heart attack:      Squeezing, pressure, or pain in your chest    You may  also have any of the following:     Discomfort or pain in your back, neck, jaw, stomach, or arm    Shortness of breath    Nausea or vomiting    Lightheadedness or a sudden cold sweat      When should I seek immediate care? You have a low blood sugar level and it does not improve with treatment  Symptoms are trouble thinking, a pounding heartbeat, and sweating  Your blood sugar level is above 240 mg/dL and does not come down within 15 minutes of treatment  You have ketones in your blood or urine  You have nausea or are vomiting and cannot keep any food or liquid down  You have blurred or double vision  Your breath has a fruity, sweet smell, or your breathing is shallow  When should I call my doctor or diabetes care team?   Your blood sugar levels are higher than your target goals  You often have low blood sugar levels  You have trouble coping with diabetes, or you feel anxious or depressed  You have questions or concerns about your condition or care  CARE AGREEMENT:   You have the right to help plan your care  Learn about your health condition and how it may be treated  Discuss treatment options with your healthcare providers to decide what care you want to receive  You always have the right to refuse treatment  The above information is an  only  It is not intended as medical advice for individual conditions or treatments  Talk to your doctor, nurse or pharmacist before following any medical regimen to see if it is safe and effective for you  © Copyright [employer] 2022 Information is for End User's use only and may not be sold, redistributed or otherwise used for commercial purposes   All illustrations and images included in CareNotes® are the copyrighted property of A D A M [employer] Inc  or Chris Galindo  Diabetes and Exercise   WHAT [place]:   How will exercise help me manage diabetes? Physical activity, such as exercise, can help keep your blood sugar level steady or improve insulin resistance  Activity can help decrease your risk for heart disease, and help you lose weight  Exercise can also help lower your A1c  Your diabetes care team will help you create an exercise plan  The plan will be based on the type of diabetes you have and your starting fitness level  What are some tips to help me create and meet my exercise goals? Set a goal for 150 minutes (2 5 hours) of moderate to vigorous aerobic activity each week  Aerobic activity helps your heart stay strong  Aerobic activity includes walking, bicycling, dancing, swimming, and raking leaves  Spread aerobic activity over 3 to 5 days  Do not take more than 2 days off in a row  It is best to do at least 10 minutes at a time and 30 minutes each day  You can work up to these goals  Remember that any activity is better than no activity  Over time, you can make exercise more intense or last longer  You can also add more days of exercise as your fitness level improves  Your diabetes care team can help you make a step-by-step plan to achieve your goals  Set a strength training goal of 2 to 3 times a week  Take at least 1 day off in between strength training sessions  Strength training helps you keep the muscles you have and build new muscles  Strength training includes lifting weights, climbing stairs, yoga, and sue chi          Older adults should include balance training 2 to 3 times each week  These include walking backwards, standing on one foot, and walking heel to toe in a straight line  What are some other healthy exercise tips? Stretch before and after you exercise to prevent injury  Drink water or liquids that do not contain sugar before, during, and after exercise  Ask your dietitian or healthcare provider which liquids you should drink when you exercise      Do not sit for longer than 30 minutes at a time during your day  If you cannot walk around, at least stand up  This will help you stay active and keep your blood circulating  What do I need to know about exercise and my blood sugar levels? Check your blood sugar level before and after exercise, if you use insulin  Healthcare providers may tell you to change the amount of insulin you take or food you eat  If your blood sugar level is high, check your blood or urine for ketones before you exercise  Do not exercise if your blood sugar level is high and you have ketones  If your blood sugar level is less than 100 mg/dL, have a carbohydrate snack before you exercise  Examples are 4 to 6 crackers, ½ banana, 8 ounces (1 cup) of milk, or 4 ounces (½ cup) of juice  Call your local emergency number (911 in the [address]) if:   You have chest pain or shortness of breath  When should I seek immediate care? You have a low blood sugar level and it does not improve with treatment  Symptoms are trouble thinking, a pounding heartbeat, and sweating  Your blood sugar level is above 240 mg/dL and does not come down within 15 minutes of treatment  You have blurred or double vision  Your breath has a fruity, sweet smell, or your breathing is shallow  When should I call my doctor or diabetes care team?   You have ketones in your blood or urine  You have a fever  Your blood sugar levels are higher than your target goals  You often have low blood sugar levels  Your skin is red, dry, warm, or swollen  You have a wound that does not heal     You have trouble coping with diabetes, or you feel anxious or depressed  You have questions or concerns about your condition or care  CARE AGREEMENT:   You have the right to help plan your care  Learn about your health condition and how it may be treated  Discuss treatment options with your healthcare providers to decide what care you want to receive  You always have the right to refuse treatment  The above information is an  only  It is not intended as medical advice for individual conditions or treatments  Talk to your doctor, nurse or pharmacist before following any medical regimen to see if it is safe and effective for you  © Copyright [employer] 2022 Information is for End User's use only and may not be sold, redistributed or otherwise used for commercial purposes  All illustrations and images included in CareNotes® are the copyrighted property of [employer]  or Chris Smith   Diabetic Foot Ulcers   WHAT YOU NEED TO KNOW:   What is a diabetic foot ulcer? A diabetic foot ulcer can be redness over a bony area or an open sore  The ulcer can develop anywhere on your foot or toes  Ulcers usually develop on the bottom of the foot  You may not know you have an ulcer until you notice drainage on your sock  Drainage is fluid that may be yellow, brown, or red  The fluid may also contain pus or blood  What increases my risk for a diabetic foot ulcer? Blood sugar levels that are not controlled    Nerve damage and numbness in your feet    Poor blood flow     A foot deformity, such as a bunion or hammertoe    Calluses or corns on your feet or toes    A decrease in vision that keeps you from seeing your feet clearly    Being overweight    Cigarette smoking or alcohol use    How is a diabetic foot ulcer diagnosed and treated? Your healthcare provider will ask about your symptoms and examine your foot and the ulcer  He or she may check your shoes  He or she may also send you to a podiatrist (foot doctor) for treatment  The goal of treatment is to start healing your foot ulcer as soon as possible  The risk for infection decreases with faster healing  Do the following to help your ulcer heal:  Prevent or treat an infection  A bandage will be put on your ulcer  Your healthcare provider will give you instructions on changing your bandage   You may need to clean the wound and change the bandage daily  The bandage may contain medicines to help your ulcer heal  You may be asked to put medicine on your foot ulcer before you put on the bandage  The medicine may also prevent growth of tissue that is not healthy  If you have an infection, your healthcare provider will give you antibiotics to treat it  Have any dead tissue debrided (removed)  The removal of dead skin and tissue around your foot ulcer can help with healing  Manage your blood sugar levels and other health problems  Your blood sugar, blood pressure, and cholesterol levels need to be controlled to help your foot ulcer heal  Your healthcare provider can help you make a plan to manage your health problems  Offload (take the pressure off) the foot ulcer  You may need special shoes with insoles, cushions, or braces  You may be asked to use a wheelchair or crutches until your foot ulcer heals  These items will help keep pressure and irritation off the area of your foot ulcer  Your foot ulcer can heal faster without pressure and irritation  Have blood flow to your foot increased  Your healthcare provider may use hyperbaric oxygen therapy or negative pressure wound therapy to increase blood flow  Ask for more information about these therapies  Go to specialists as directed  Your healthcare provider may recommend you see a podiatrist, or an orthopedic or vascular surgeon  These healthcare providers can help manage your treatment  What can I do to prevent diabetic foot ulcers? Good foot care may help prevent ulcers, or keep them from getting worse  Ask someone to help you if you are not able to check or care for your feet  The following can help you prevent diabetic foot ulcers:  Keep your blood sugar levels under control  Continue the plan for your diabetes that you and your healthcare provider have discussed  Healthy food choices and taking your medicines as directed may help control blood sugars   Contact your healthcare provider if your blood sugar levels are higher than directed  Wash your feet each day with soap and warm water  Do not use hot water, because this can injure your foot  Dry your feet gently with a towel after you wash them  Dry between and under your toes  Apply lotion or a moisturizer on your dry feet  Ask your healthcare provider what lotions are best to use  Do not put lotion or moisturizer between your toes  Moisture between your toes could lead to skin breakdown  Check your feet each day  Look at your whole foot, including the bottom, and between and under your toes  Check for wounds, corns, and calluses  Feel your feet by running your hands along the tops, bottoms, sides, and between your toes  Use a nonbreakable mirror to check your feet if you have trouble seeing the bottoms  Do not try to remove corns or calluses yourself  File or cut your toenails straight across  Protect your feet  Do not walk barefoot or wear your shoes without socks  Check your shoes for rocks or other objects that can hurt your feet  Wear cotton socks to help keep your feet dry  Wear socks without toe seams, or wear them with the seams inside out  Change your socks each day  Do not wear socks that are dirty or damp  Wear shoes that fit well  Wear shoes that do not rub against any area of your feet  Your shoes should be ½ to ¾ inch (1 to 2 centimeters) longer than your feet  Your shoes should also have extra space around the widest part of your feet  Walking or athletic shoes with laces or straps that adjust are best  Ask your healthcare provider for help to choose the right shoes for you  Ask him or her if you need to wear an insert, orthotic, or bandage on your feet  Do not smoke  Nicotine can damage your blood vessels and increase your risk for foot ulcers  Do not use e-cigarettes or smokeless tobacco in place of cigarettes or to help you quit  They still contain nicotine   Ask your healthcare provider for information if you currently smoke and need help quitting  Know the risks if you choose to drink alcohol  Alcohol can cause your blood sugar levels to be low if you use insulin  Alcohol can cause high blood sugar levels and weight gain if you drink too much  A drink of alcohol is 12 ounces of beer, 5 ounces of wine, or 1½ ounces of liquor  Maintain a healthy weight  Ask your healthcare provider how much you should weigh  A healthy weight can help you control your diabetes  Ask him or her to help you create a weight loss plan if you are overweight  Even a 10 to 15 pound weight loss can help you better manage your blood sugar level  Call your local emergency number (911 in the [address]) if:   You have a fever with chills  You begin vomiting  You feel faint or become confused  When should I call my doctor? You see new drainage on your sock  Your foot becomes red, warm, and swollen  Your foot ulcer has a bad smell or is draining pus  You feel pain in a foot that used to have little or no feeling  You see black or dead tissue in or around your ulcer  Your ulcer becomes bigger, deeper, or does not heal      You have questions or concerns about your condition or care  CARE AGREEMENT:   You have the right to help plan your care  Learn about your health condition and how it may be treated  Discuss treatment options with your healthcare providers to decide what care you want to receive  You always have the right to refuse treatment  The above information is an  only  It is not intended as medical advice for individual conditions or treatments  Talk to your doctor, nurse or pharmacist before following any medical regimen to see if it is safe and effective for you  © Copyright [employer] 2022 Information is for End User's use only and may not be sold, redistributed or otherwise used for commercial purposes   All illustrations and images included in CareNotes® are the copyrighted property of A D A M , Inc  or [address]  Cholesterol and [address]:   What is cholesterol? Cholesterol is a waxy, fat-like substance  Your body uses cholesterol to make hormones and new cells, and to protect nerves  Cholesterol is made by your body  It also comes from certain foods you eat, such as meat and dairy products  Your healthcare provider can help you set goals for your cholesterol levels  He or she can help you create a plan to meet your goals  What are cholesterol level goals? Your cholesterol level goals depend on your risk for heart disease, your age, and your other health conditions  The following are general guidelines: Total cholesterol  includes low-density lipoprotein (LDL), high-density lipoprotein (HDL), and triglyceride levels  The total cholesterol level should be lower than 200 mg/dL and is best at about 150 mg/dL  LDL cholesterol  is called bad cholesterol  because it forms plaque in your arteries  As plaque builds up, your arteries become narrow, and less blood flows through  When plaque decreases blood flow to your heart, you may have chest pain  If plaque completely blocks an artery that brings blood to your heart, you may have a heart attack  Plaque can break off and form blood clots  Blood clots may block arteries in your brain and cause a stroke  The level should be less than 130 mg/dL and is best at about 100 mg/dL  HDL cholesterol  is called good cholesterol  because it helps remove LDL cholesterol from your arteries  It does this by attaching to LDL cholesterol and carrying it to your liver  Your liver breaks down LDL cholesterol so your body can get rid of it  High levels of HDL cholesterol can help prevent a heart attack and stroke  Low levels of HDL cholesterol can increase your risk for heart disease, heart attack, and stroke  The level should be 60 mg/dL or higher      Triglycerides  are a type of fat that store energy from foods you eat  High levels of triglycerides also cause plaque buildup  This can increase your risk for a heart attack or stroke  If your triglyceride level is high, your LDL cholesterol level may also be high  The level should be less than 150 mg/dL  What increases my risk for high cholesterol? Smoking cigarettes    Being overweight or obese, or not getting enough exercise    Drinking large amounts of alcohol    A medical condition such as hypertension (high blood pressure) or diabetes    Certain genes passed from your parents to you    Age older than 72 years    What do I need to know about having my cholesterol levels checked? Adults 21to 39years of age should have their cholesterol levels checked every 4 to 6 years  Adults 45 years or older should have their cholesterol checked every 1 to 2 years  You may need your cholesterol checked more often, or at a younger age, if you have risk factors for heart disease  You may also need to have your cholesterol checked more often if you have other health conditions, such as diabetes  Blood tests are used to check cholesterol levels  Blood tests measure your levels of triglycerides, LDL cholesterol, and HDL cholesterol  How do healthy fats affect my cholesterol levels? Healthy fats, also called unsaturated fats, help lower LDL cholesterol and triglyceride levels  Healthy fats include the following:  Monounsaturated fats  are found in foods such as olive oil, canola oil, avocado, nuts, and olives  Polyunsaturated fats,  such as omega 3 fats, are found in fish, such as salmon, trout, and tuna  They can also be found in plant foods such as flaxseed, walnuts, and soybeans  How do unhealthy fats affect my cholesterol levels? Unhealthy fats increase LDL cholesterol and triglyceride levels  They are found in foods high in cholesterol, saturated fat, and trans fat:  Cholesterol  is found in eggs, dairy, and meat      Saturated fat  is found in butter, cheese, ice cream, whole milk, and coconut oil  Saturated fat is also found in meat, such as sausage, hot dogs, and bologna  Trans fat  is found in liquid oils and is used in fried and baked foods  Foods that contain trans fats include chips, crackers, muffins, sweet rolls, microwave popcorn, and cookies  How is high cholesterol treated? Treatment for high cholesterol will also decrease your risk of heart disease, heart attack, and stroke  Treatment may include any of the following:  Lifestyle changes  may include food, exercise, weight loss, and quitting smoking  You may also need to decrease the amount of alcohol you drink  Your healthcare provider will want you to start with lifestyle changes  Other treatment may be added if lifestyle changes are not enough  Your healthcare provider may recommend you work with a team to manage hyperlipidemia  The team may include medical experts such as a dietitian, an exercise or physical therapist, and a behavior therapist  Your family members may be included in helping you create lifestyle changes  Medicines  may be given to lower your LDL cholesterol, triglyceride levels, or total cholesterol level  You may need medicines to lower your cholesterol if any of the following is true:    You have a history of stroke, TIA, unstable angina, or a heart attack  Your LDL cholesterol level is 190 mg/dL or higher  You are age 36 to 76 years, have diabetes or heart disease risk factors, and your LDL cholesterol is 70 mg/dL or higher  Supplements  include fish oil, red yeast rice, and garlic  Fish oil may help lower your triglyceride and LDL cholesterol levels  It may also increase your HDL cholesterol level  Red yeast rice may help decrease your total cholesterol level and LDL cholesterol level  Garlic may help lower your total cholesterol level  Do not take any supplements without talking to your healthcare provider      What food changes can I make to lower my cholesterol levels? A dietitian can help you create a healthy eating plan  He or she can show you how to read food labels and choose foods low in saturated fat, trans fats, and cholesterol  Decrease the total amount of fat you eat  Choose lean meats, fat-free or 1% fat milk, and low-fat dairy products, such as yogurt and cheese  Try to limit or avoid red meats  Limit or do not eat fried foods or baked goods, such as cookies  Replace unhealthy fats with healthy fats  Cook foods in olive oil or canola oil  Choose soft margarines that are low in saturated fat and trans fat  Seeds, nuts, and avocados are other examples of healthy fats  Eat foods with omega-3 fats  Examples include salmon, tuna, mackerel, walnuts, and flaxseed  Eat fish 2 times per week  Pregnant women should not eat fish that have high levels of mercury, such as shark, swordfish, and ron mackerel  Increase the amount of high-fiber foods you eat  High-fiber foods can help lower your LDL cholesterol  Aim to get between 20 and 30 grams of fiber each day  Fruits and vegetables are high in fiber  Eat at least 5 servings each day  Other high-fiber foods are whole-grain or whole-wheat breads, pastas, or cereals, and brown rice  Eat 3 ounces of whole-grain foods each day  Increase fiber slowly  You may have abdominal discomfort, bloating, and gas if you add fiber to your diet too quickly  Eat healthy protein foods  Examples include low-fat dairy products, skinless chicken and turkey, fish, and nuts  Limit foods and drinks that are high in sugar  Your dietitian or healthcare provider can help you create daily limits for high-sugar foods and drinks  The limit may be lower if you have diabetes or another health condition  Limits can also help you lose weight if needed  What lifestyle changes can I make to lower my cholesterol levels? Maintain a healthy weight  Ask your healthcare provider what a healthy weight is for you  Ask him or her to help you create a weight loss plan if needed  Weight loss can decrease your total cholesterol and triglyceride levels  Weight loss may also help keep your blood pressure at a healthy level  Be physically active throughout the day  Physical activity, such as exercise, can help lower your total cholesterol level and maintain a healthy weight  Physical activity can also help increase your HDL cholesterol level  Work with your healthcare provider to create an program that is right for you  Get at least 30 to 40 minutes of moderate physical activity most days of the week  Examples of exercise include brisk walking, swimming, or biking  Also include strength training at least 2 times each week  Your healthcare providers can help you create a physical activity plan  Do not smoke  Nicotine and other chemicals in cigarettes and cigars can raise your cholesterol levels  Ask your healthcare provider for information if you currently smoke and need help to quit  E-cigarettes or smokeless tobacco still contain nicotine  Talk to your healthcare provider before you use these products  Limit or do not drink alcohol  Alcohol can increase your triglyceride levels  Ask your healthcare provider before you drink alcohol  Ask how much is okay for you to drink in 24 hours or 1 week  CARE AGREEMENT:   You have the right to help plan your care  Discuss treatment options with your healthcare provider to decide what care you want to receive  You always have the right to refuse treatment  The above information is an  only  It is not intended as medical advice for individual conditions or treatments  Talk to your doctor, nurse or pharmacist before following any medical regimen to see if it is safe and effective for you  © Copyright [address] 2022 Information is for End User's use only and may not be sold, redistributed or otherwise used for commercial purposes   All illustrations and images included in CareNotes® are the copyrighted property of [employer]  or Chris Smith   Hypertension   WHAT YOU NEED TO KNOW:   What is hypertension? Hypertension is high blood pressure  Your blood pressure is the force of your blood moving against the walls of your arteries  Hypertension causes your blood pressure to get so high that your heart has to work much harder than normal  This can damage your heart  The cause of hypertension may not be known  This is called essential or primary hypertension  Hypertension caused by another medical condition, such as kidney disease, is called secondary hypertension  What do I need to know about the stages of hypertension? Normal blood pressure is 119/79 or lower   Your healthcare provider may only check your blood pressure each year if it stays at a normal level  Elevated blood pressure is 120/79 to 129/79   This is sometimes called prehypertension  Your healthcare provider may suggest lifestyle changes to help lower your blood pressure to a normal level  He or she may then check it again in 3 to 6 months  Stage 1 hypertension is 130/80  to 139/89   Your provider may recommend lifestyle changes, medication, and checks every 3 to 6 months until your blood pressure is controlled  Stage 2 hypertension is 140/90 or higher   Your provider will recommend lifestyle changes and have you take 2 kinds of hypertension medicines  You will also need to have your blood pressure checked monthly until it is controlled  What increases my risk for hypertension?    Age older than 54 years (men) or 72 (women)    Stress, or a family history of hypertension or heart disease    Obesity, lack of exercise, or too many high-sodium foods    Use of tobacco, alcohol, or illegal drugs    A medical condition, such as diabetes, kidney disease, thyroid disease, or adrenal gland disorder    Certain medicines, such as steroids or birth control pills    What are the signs and symptoms of hypertension? You may have no signs or symptoms, or you may have any of the following:  Headache    Blurred vision    Chest pain    Dizziness or weakness    Trouble breathing    Nosebleeds    How is hypertension diagnosed? Your healthcare provider will take your blood pressure at several visits  You may also need to check your blood pressure at home  The provider will examine you and ask what medicines you take  He or she will also ask if you have a family history of high blood pressure and about any health conditions you have  He or she will also check your blood pressure and weight and examine your heart, lungs, and eyes  You may need any of the following tests: An ambulatory blood pressure monitor (ABPM)  is a device that you wear  ABPM measures your blood pressure while you do your regular daily activities  It records your blood pressure every 15 to 30 minutes during the day  It also records your blood pressure every 15 minutes to 1 hour at night  The recorded blood pressures help your healthcare provider know if you have hypertension not seen at your appointment  Blood tests  may help healthcare providers find the cause of your hypertension  Blood tests can also help find other health problems caused by hypertension  Urine tests  will be done to check your kidney function  Kidney problems can increase your risk for hypertension  Which medicines are used to treat hypertension? Antihypertensives  may be used to help lower your blood pressure  Several kinds of medicines are available  Your healthcare provider will choose medicines based on the kind of hypertension you have  You may need more than one type of medicine  Take the medicine exactly as directed  Diuretics  help decrease extra fluid that collects in your body  This will help lower your blood pressure  You may urinate more often while you take this medicine  Cholesterol medicine  helps lower your cholesterol level   A low cholesterol level helps prevent heart disease and makes it easier to control your blood pressure  What can I do to manage hypertension? Check your blood pressure at home  Avoid smoking, caffeine, and exercise at least 30 minutes before checking your blood pressure  Sit and rest for 5 minutes before you take your blood pressure  Extend your arm and support it on a flat surface  Your arm should be at the same level as your heart  Follow the directions that came with your blood pressure monitor  Check your blood pressure 2 times, 1 minute apart, before you take your medicine in the morning  Also check your blood pressure before your evening meal  Keep a record of your readings and bring it to your follow-up visits  Ask your healthcare provider what your blood pressure should be  Manage any other health conditions you have  Health conditions such as diabetes can increase your risk for hypertension  Follow your healthcare provider's instructions and take all your medicines as directed  Ask about all medicines  Certain medicines can increase your blood pressure  Examples include oral birth control pills, decongestants, herbal supplements, and NSAIDs, such as ibuprofen  Your healthcare provider can tell you which medicines are safe for you to take  This includes prescription and over-the-counter medicines  What lifestyle changes can I make to manage hypertension? Your healthcare provider may recommend you work with a team to manage hypertension  The team may include medical experts such as a dietitian, an exercise or physical therapist, and a behavior therapist  Your family members may be included in helping you create lifestyle changes  Limit sodium (salt) as directed  Too much sodium can affect your fluid balance  Check labels to find low-sodium or no-salt-added foods  Some low-sodium foods use potassium salts for flavor  Too much potassium can also cause health problems   Your healthcare provider will tell you how much sodium and potassium are safe for you to have in a day  He or she may recommend that you limit sodium to 2,300 mg a day  Follow the meal plan recommended by your healthcare provider  A dietitian or your provider can give you more information on low-sodium plans or the DASH (Dietary Approaches to Stop Hypertension) eating plan  The DASH plan is low in sodium, processed sugar, unhealthy fats, and total fat  It is high in potassium, calcium, and fiber  These can be found in vegetables, fruit, and whole-grain foods  Be physically active throughout the day  Physical activity, such as exercise, can help control your blood pressure and your weight  Be physically active for at least 30 minutes per day, on most days of the week  Include aerobic activity, such as walking or riding a bicycle  Also include strength training at least 2 times each week  Your healthcare providers can help you create a physical activity plan  Decrease stress  This may help lower your blood pressure  Learn ways to relax, such as deep breathing or listening to music  Limit alcohol as directed  Alcohol can increase your blood pressure  A drink of alcohol is 12 ounces of beer, 5 ounces of wine, or 1½ ounces of liquor  Do not smoke  Nicotine and other chemicals in cigarettes and cigars can increase your blood pressure and also cause lung damage  Ask your healthcare provider for information if you currently smoke and need help to quit  E-cigarettes or smokeless tobacco still contain nicotine  Talk to your healthcare provider before you use these products  Call your local emergency number [phone] in the [address] [place] [address],[address]) or have someone call if:   You have chest pain      You have any of the following signs of a heart attack:      Squeezing, pressure, or pain in your chest    You may  also have any of the following:     Discomfort or pain in your back, neck, jaw, stomach, or arm    Shortness of breath    Nausea or vomiting    Lightheadedness or a sudden cold sweat    You become confused or have trouble speaking  You suddenly feel lightheaded or have trouble breathing  When should I seek immediate care? You have a severe headache or vision loss  You have weakness in an arm or leg  When should I call my doctor? You feel faint, dizzy, confused, or drowsy  You have been taking your blood pressure medicine but your pressure is higher than your provider says it should be  You have questions or concerns about your condition or care  CARE AGREEMENT:   You have the right to help plan your care  Learn about your health condition and how it may be treated  Discuss treatment options with your healthcare providers to decide what care you want to receive  You always have the right to refuse treatment  The above information is an  only  It is not intended as medical advice for individual conditions or treatments  Talk to your doctor, nurse or pharmacist before following any medical regimen to see if it is safe and effective for you  © Copyright [employer] 2022 Information is for End User's use only and may not be sold, redistributed or otherwise used for commercial purposes   All illustrations and images included in CareNotes® are the copyrighted property of [employer]  or [address] [place] [address]

## 2022-05-12 ENCOUNTER — OFFICE VISIT (OUTPATIENT)
Dept: FAMILY MEDICINE CLINIC | Facility: CLINIC | Age: 57
End: 2022-05-12
Payer: COMMERCIAL

## 2022-05-12 VITALS
TEMPERATURE: 97.3 F | DIASTOLIC BLOOD PRESSURE: 60 MMHG | RESPIRATION RATE: 18 BRPM | HEART RATE: 58 BPM | WEIGHT: 224 LBS | BODY MASS INDEX: 30.34 KG/M2 | SYSTOLIC BLOOD PRESSURE: 110 MMHG | OXYGEN SATURATION: 98 % | HEIGHT: 72 IN

## 2022-05-12 DIAGNOSIS — Z11.4 SCREENING FOR HIV (HUMAN IMMUNODEFICIENCY VIRUS): ICD-10-CM

## 2022-05-12 DIAGNOSIS — E78.2 MIXED HYPERLIPIDEMIA: ICD-10-CM

## 2022-05-12 DIAGNOSIS — E11.42 TYPE 2 DIABETES MELLITUS WITH DIABETIC POLYNEUROPATHY, WITHOUT LONG-TERM CURRENT USE OF INSULIN (HCC): ICD-10-CM

## 2022-05-12 DIAGNOSIS — Z13.89 SCREENING FOR BLOOD OR PROTEIN IN URINE: Primary | ICD-10-CM

## 2022-05-12 DIAGNOSIS — F12.90 MARIJUANA USE: ICD-10-CM

## 2022-05-12 DIAGNOSIS — Z00.8 EXAM FOR POPULATION SURVEY: ICD-10-CM

## 2022-05-12 DIAGNOSIS — Z13.220 SCREENING, LIPID: ICD-10-CM

## 2022-05-12 DIAGNOSIS — I25.10 CORONARY ARTERY DISEASE INVOLVING NATIVE CORONARY ARTERY OF NATIVE HEART WITHOUT ANGINA PECTORIS: ICD-10-CM

## 2022-05-12 LAB — SL AMB POCT HEMOGLOBIN AIC: 5.9 (ref ?–6.5)

## 2022-05-12 PROCEDURE — 99215 OFFICE O/P EST HI 40 MIN: CPT | Performed by: NURSE PRACTITIONER

## 2022-05-12 PROCEDURE — 3044F HG A1C LEVEL LT 7.0%: CPT | Performed by: NURSE PRACTITIONER

## 2022-05-12 PROCEDURE — 99396 PREV VISIT EST AGE 40-64: CPT | Performed by: NURSE PRACTITIONER

## 2022-05-12 PROCEDURE — 3074F SYST BP LT 130 MM HG: CPT | Performed by: NURSE PRACTITIONER

## 2022-05-12 PROCEDURE — 83036 HEMOGLOBIN GLYCOSYLATED A1C: CPT | Performed by: NURSE PRACTITIONER

## 2022-05-12 PROCEDURE — 3078F DIAST BP <80 MM HG: CPT | Performed by: NURSE PRACTITIONER

## 2022-05-12 NOTE — PROGRESS NOTES
BMI Counseling: Body mass index is 30 38 kg/m²  The BMI is above normal  Nutrition recommendations include decreasing portion sizes, encouraging healthy choices of fruits and vegetables, decreasing fast food intake, consuming healthier snacks, limiting drinks that contain sugar, moderation in carbohydrate intake, increasing intake of lean protein, reducing intake of saturated and trans fat and reducing intake of cholesterol  Exercise recommendations include vigorous physical activity 75 minutes/week, exercising 3-5 times per week, obtaining a gym membership and strength training exercises  No pharmacotherapy was ordered  Rationale for BMI follow-up plan is due to patient being overweight or obese  Depression Screening and Follow-up Plan: Patient was screened for depression during today's encounter  They screened negative with a PHQ-2 score of 0  Assessment/Plan:         Problem List Items Addressed This Visit        Endocrine    Type 2 diabetes mellitus with diabetic polyneuropathy, without long-term current use of insulin ([address] )     Patient maintained metformin 1000mg p o  bid  To maintain low carbohydrate, low starch and low sugar diet  Recheck HgA1c  Lab Results   Component Value Date    HGBA1C 5 9 06/16/2021              Relevant Medications    metFORMIN (GLUCOPHAGE) 1000 MG tablet    Other Relevant Orders    POCT hemoglobin A1c       Cardiovascular and Mediastinum    Coronary artery disease involving native coronary artery of native heart without angina pectoris     Patient maintained no longer maintained on ASA 81 mg p o  daily  No reports of SOB or Chest Pain  Other    Mixed hyperlipidemia     Patient to maintain low cholesterol, low fat diet  Patient no longer maintain on Lipitor 80 mg p o  daily  Recheck lipid panel  Marijuana use     Recreational Marijuana Use  Screening for HIV (human immunodeficiency virus)     Screening HIV for low risk patients ordered  Relevant Orders    HIV 1/2 Antigen/Antibody (4th Generation) w Reflex [facility]      Other Visit Diagnoses     Screening for blood or protein in urine    -  Primary    Relevant Orders    UA w Reflex to Microscopic w Reflex to Culture    Exam for population survey        Relevant Orders    CBC and differential    Comprehensive metabolic panel    Screening, lipid        Relevant Orders    Lipid panel            Subjective:      Patient ID: Tk Dwyer is a 55 y o  male  Patient is a 55year old male present for coronary artery disease, diabetes, hypertension, hyperlipidemia       The following portions of the patient's history were reviewed and updated as appropriate:   Past Medical History:  He has a past medical history of Acute ST segment elevation myocardial infarction ([facility] ), Hyperlipidemia, [facility] ), Neuropathy, Nocturia, Type II diabetes mellitus ([facility] ), and Vitamin D deficiency  ,  _______________________________________________________________________  Medical Problems:  does not have any pertinent problems on file ,  _______________________________________________________________________  Past Surgical History:   has a past surgical history that includes Other surgical history; Cardiac catheterization (1/60/20); and Coronary angioplasty with stent (01/06/2020)  ,  _______________________________________________________________________  Family History:  family history includes Alcohol abuse in his father; Diabetes in his mother; Mental illness in his daughter; No Known Problems in his brother, brother, sister, sister, and sister; Pancreatic cancer in his mother ,  _______________________________________________________________________  Social History:   reports that he quit smoking about 13 months ago  His smoking use included cigarettes and cigars  He has a 10 00 pack-year smoking history  He has never used smokeless tobacco  He reports previous alcohol use   He reports current drug use  Drug: Marijuana  ,  _______________________________________________________________________  Allergies:  has No Known Allergies     _______________________________________________________________________  Current Outpatient Medications   Medication Sig Dispense Refill    metFORMIN (GLUCOPHAGE) 1000 MG tablet Take 1 tablet (1,000 mg total) by mouth in the morning and 1 tablet (1,000 mg total) before bedtime  180 tablet 3    nitroglycerin (NITROSTAT) 0 4 mg SL tablet Place 1 tablet (0 4 mg total) under the tongue every 5 (five) minutes as needed for chest pain 90 tablet 0    ticagrelor 60 MG Take 1 tablet (60 mg total) by mouth every 12 (twelve) hours 60 tablet 8    aspirin 81 mg chewable tablet Chew 1 tablet (81 mg total) daily (Patient not taking: Reported on 5/12/2022) 90 tablet 1    atorvastatin (LIPITOR) 80 mg tablet TAKE 1 TABLET BY MOUTH DAILY WITH DINNER (Patient not taking: Reported on 5/12/2022) 90 tablet 1    metoprolol tartrate (LOPRESSOR) 25 mg tablet TAKE 1 TABLET (25 MG TOTAL) BY MOUTH EVERY 12 (TWELVE) HOURS (Patient not taking: Reported on 5/12/2022) 60 tablet 2     No current facility-administered medications for this visit      _______________________________________________________________________  Review of Systems   Constitutional: Negative for activity change, appetite change, chills, fatigue, fever and unexpected weight change  HENT: Negative for congestion, ear discharge, ear pain, nosebleeds, postnasal drip, rhinorrhea, sinus pressure, sinus pain, sneezing, sore throat and voice change  Eyes: Negative for pain, redness and visual disturbance  Respiratory: Negative for cough, chest tightness, shortness of breath and wheezing  Cardiovascular: Negative for chest pain and palpitations  Gastrointestinal: Negative for abdominal distention, abdominal pain, constipation, diarrhea, nausea and vomiting  Endocrine: Negative      Genitourinary: Negative for difficulty urinating, dysuria, flank pain, frequency, hematuria and urgency  Musculoskeletal: Negative for arthralgias and myalgias  Skin: Negative  Allergic/Immunologic: Negative  Neurological: Negative  Hematological: Negative  Psychiatric/Behavioral: Negative  Objective:  Vitals:    05/12/22 0846   BP: 110/60   BP Location: Left arm   Patient Position: Sitting   Cuff Size: Large   Pulse: 58   Resp: 18   Temp: (!) 97 3 °F (36 3 °C)   TempSrc: Temporal   SpO2: 98%   Weight: 102 kg (224 lb)   Height: 6' (1 829 m)     Body mass index is 30 38 kg/m²  Physical Exam  Vitals and nursing note reviewed  Constitutional:       Appearance: Normal appearance  He is well-developed  Comments: Overweight  HENT:      Head: Normocephalic and atraumatic  Right Ear: Tympanic membrane, ear canal and external ear normal       Left Ear: Tympanic membrane, ear canal and external ear normal       Nose: Nose normal  No congestion or rhinorrhea  Mouth/Throat:      Mouth: Mucous membranes are moist       Pharynx: No oropharyngeal exudate or posterior oropharyngeal erythema  Eyes:      Extraocular Movements: Extraocular movements intact  Conjunctiva/sclera: Conjunctivae normal       Pupils: Pupils are equal, round, and reactive to light  Cardiovascular:      Rate and Rhythm: Normal rate and regular rhythm  Pulses: Normal pulses  Heart sounds: Normal heart sounds  No murmur heard  Pulmonary:      Effort: Pulmonary effort is normal       Breath sounds: Normal breath sounds  Abdominal:      General: Bowel sounds are normal       Palpations: Abdomen is soft  Musculoskeletal:         General: Normal range of motion  Cervical back: Normal range of motion  Skin:     General: Skin is warm  Capillary Refill: Capillary refill takes less than 2 seconds  Neurological:      General: No focal deficit present  Mental Status: He is alert and oriented to person, place, and time  Psychiatric:         Mood and Affect: Mood normal          Behavior: Behavior normal  Dr. [facility] CRNA

## 2022-05-12 NOTE — ASSESSMENT & PLAN NOTE
Patient maintained metformin 1000mg p o  bid  To maintain low carbohydrate, low starch and low sugar diet  Recheck HgA1c  Hemoglobin A1c currently 5 9    Lab Results   Component Value Date    HGBA1C 5 9 06/16/2021

## 2022-05-12 NOTE — ASSESSMENT & PLAN NOTE
Patient to maintain low cholesterol, low fat diet  Patient no longer maintain on Lipitor 80 mg p o  daily  Recheck lipid panel

## 2022-08-03 ENCOUNTER — TELEPHONE (OUTPATIENT)
Dept: CARDIOLOGY CLINIC | Facility: CLINIC | Age: 57
End: 2022-08-03

## 2022-08-03 DIAGNOSIS — I25.10 CORONARY ARTERY DISEASE INVOLVING NATIVE CORONARY ARTERY OF NATIVE HEART WITHOUT ANGINA PECTORIS: ICD-10-CM

## 2022-08-03 DIAGNOSIS — I21.3 STEMI (ST ELEVATION MYOCARDIAL INFARCTION) (HCC): ICD-10-CM

## 2022-08-03 NOTE — TELEPHONE ENCOUNTER
I am not sure what to do -pt needs a refill Brilinta -he has never come in for follow up
Needs an appt
Patient has an appointment with Jim Nino on 8-22 for med refills
detailed exam

## 2022-08-04 DIAGNOSIS — I25.10 CORONARY ARTERY DISEASE INVOLVING NATIVE CORONARY ARTERY OF NATIVE HEART WITHOUT ANGINA PECTORIS: ICD-10-CM

## 2022-08-04 DIAGNOSIS — I21.3 STEMI (ST ELEVATION MYOCARDIAL INFARCTION) (HCC): ICD-10-CM

## 2022-08-19 NOTE — PROGRESS NOTES
Cardiology   Follow Up   Office Visit Note  Tosha Edward   55 y o    male   MRN: [id number]  [address]  [address]  [place] [address] [facility] [address] [phone]    PCP: No primary care provider on file  Cardiologist:   Will be Dr Nadira Marie                Summary of recommendations  Heart healthy diet  Educational information provided  Full labs have been requested by his PCP  He can stop ticagrelor   Start aspirin 81 mg daily   Start atorvastatin 40 mg daily  I recommend he have his labs drawn that have been ordered by his PCP  These include a CBC, CMP and lipid profile  Follow up will be scheduled with Dr Agustina Lopez within 3 months, new patient visit   He prefers the [facility] office       Assessment/plan  CAD; S/P AWMI, S/P PCI with 2 overlapping HALI prLAD Jan 2020  · on aspirin, high-intensity statin, beta-blocker  · EKG today:Normal sinus rhythm 69 beats per minute nonspecific ST T-wave changes most pronounced inferiorly and anterolaterally  Hyperlipidemia, in the past was was prescribed atorvastatin 80 mg daily  He has not been taking it for unclear reasons for an unknown period of time  Agreeable to restarting today at 40 mg daily  · Lipid panel 1/7/20:  LDL 61, non HDL 69  Diet heart healthy diet recommended  Follow-up lipid profile is pending  Type 2 diabetes mellitus  HgA1c 5 9  5/12/22  On insulin and metformin  Marijuana use daily  Used since 16 yo  Tobacco abuse  Reports he quit smoking tobacco about 13 months ago  He has a 10 pack-year history  Commended  Cardiac testing  · TE 1/6/2020  normal LV size and function, possible mild anteroseptal hypokinesis, LVH, normal RV size and function, PASP 35-40 mm Hg  · cardiac catheterization 1/6/20  Left main: Normal   LAD: The vessel was normal sized  There was a 90% proximal stenosis  This was the culprit for the patient's anterior STEMI  The diagonal branches were free of obstructive disease  Circumflex:  The vessel was normal sized and gave rise to two OM brancvhes  There were no significant lesions  RCA: The vessel was normal sized and dominant, giving rise to the PDA and a posterolateral branch  No atherosclerosis was seen  -->Single vessel disease, with a 90% proximal LAD stenosis representing the culprit for the patient's anterior STEMI  Successful IVUS-guided PCI, proximal LAD, with placement of two overlapping drug-eluting stents  Mildly-moderately elevated LVEDP  · TTE 10/27/21  EF 65%  Borderline LVH  Diastolic function normal for age  Mild LAE  Trace AI  Trace MR   Mild-to-moderate TR  PA P 20 5 mm Hg                     HPI  Myranda Moreno is a 50 yo male with diabetes, tobacco abuse and marijuana use  He presented to [facility] ED as a pre-hospital MI alert on 1/6/20  That morning he went to the gym and upon returning home he developed 10/10 midsternal substernal chest pain and abdominal pain with radiation to his both his arms, back and abdomen with associated dyspnea  His EKG on arrival showed anterior ST elevation with reciprocal ST changes in the inferior leads  He was taken emergently for coronary angiography  This demonstrated a 90% proximal LAD stenosis  He underwent PCI and deployment of 2 overlapping drug-eluting stents with a good result  An echocardiogram demonstrated preserved LV function  He was placed on dual antiplatelet therapy with aspirin and Brilinta  He was placed on a high-intensity statin  He was advised complete tobacco cessation  Reports he only smokes cigars every other day to help increase the affects of marijuana that he smokes regularly  He is agreeable to stopping smoking cigars but desires to continue marijuana use  His echocardiogram showed preserved LV function possible mild anterior septal hypokinesis and left ventricular hypertrophy  RV size was normal     8/30/21   General cardiology follow-up      This is his 1st office visit since January 2020 when he presented with an anterior MI and received overlapping2 drug-eluting stents to his  Proximal LAD  He has transportation issues  He has no car  He tries to protect himself given the COVID-19 pandemic  He does not like to use public transportation with the bus, etc     Reports where he lives most people are not masking  He did receive the  PfizerCOVID-19 vaccine himself, May 19 and Yulissa 10  Today, he received a ride from [place]  Unfortunately, they were late  He need to call alternative transportation  Our visit was abbreviated, given this  He is on disability, since around 2007  He works out regularly, at home  He does weights, and has a lot of lifting equipment  He does ride the stationary bike, "for fun"  Sometimes this can be for 10 minutes or 30 minutes or 2 hours  With his usual activity, he denies chest pain, shortness of breath, palpitations lightheadedness or dizziness  He currently has a broken toe on his left foot    He continues to smoke marijuana, daily  He tells me he has chronic lower extremity neuropathy from his diabetes and this helps  He smokes 1 cigar a day  He denies any cigarette use  He is compliant with his medications  He has had refills, per his PCP  For now, he will remain on Brilinta 60 mg b i d  along with his aspirin  Will get an updated echocardiogram   He will return in a short interval, to meet an office cardiologist for the 1st time  He has a full set of labs requested, by his PCP which he anticipates getting very soon  His EKG today in the office showed sinus rhythm 68 beats per minute with normal intervals  Blood pressure by me 132/90 in the left arm  He tells me it was elevated because he was angry about his transportation issues  Will continue to monitor    He does not have equipment at home    Interval history  Never followed up in the office  Has not seen a cardiologist in the office    8/22/22  Overdue follow-up  He saw his PCP last, 5/12/22  Full labs have been ordered  He is no longer taking atorvastatin  He is not taking the metoprolol  He said that we stopped them  I re-educated him, that he needs to follow up in the office regularly in order to get medications refilled, and that we did not stop them  Current medications include ticagrelor 60 mg q 12 and metformin  He was given a short prescription of ticagrelor until re-evaluated in the office  He is not taking the metoprolol, the atorvastatin nor aspirin  He is on disability from [facility] related to his diabetes he tells me  He does not work  He has neuropathy and issues with his eyes  He quit smoking tobacco 13 months ago  He was commended  He tells me he was smoking unfiltered cigarettes with a very high nicotine level  He continues to smoke marijuana on a daily basis and has since he was 16years old  He was educated that this could cause a cardiomyopathy  He exercises regularly  He says every day he does a 1000 pushups and 100 pull-ups  He also has a treadmill but does not use that often  He denies chest pain, shortness of breath  lightheadedness or dizziness  I recommended regular follow-up in adherence to medical therapy  He tells me he will get his labs in the near future  He will follow with a cardiologist at the [facility] office            I have spent 25 minutes with Patient  today in which greater than 50% of this time was spent in counseling/coordination of care regarding Intructions for management, Patient and family education, Importance of tx compliance, Risk factor reductions and Impressions  Assessment  Diagnoses and all orders for this visit:    Coronary artery disease involving native coronary artery of native heart without angina pectoris  -     Cancel: CBC and Platelet; Future  -     POCT ECG  -     atorvastatin (LIPITOR) 40 mg tablet;  Take 1 tablet (40 mg total) by mouth daily    Type 2 diabetes mellitus with diabetic polyneuropathy, without long-term current use of insulin (HCC)    Marijuana use    Mixed hyperlipidemia  -     atorvastatin (LIPITOR) 40 mg tablet; Take 1 tablet (40 mg total) by mouth daily    Acute ST elevation myocardial infarction (STEMI) involving left anterior descending (LAD) coronary artery (HCC)  -     aspirin 81 mg chewable tablet; Chew 1 tablet (81 mg total) daily          Past Medical History:   Diagnosis Date    Acute ST segment elevation myocardial infarction ([address] )     Hyperlipidemia     Myocardial infarction ([facility])     Neuropathy     Nocturia     Type II diabetes mellitus ([facility])     Vitamin D deficiency        Review of Systems   Constitutional: Negative for chills  Eyes:        Eye issues related to his diabetes he reports   Cardiovascular: Negative for chest pain, claudication, cyanosis, dyspnea on exertion, irregular heartbeat, leg swelling, near-syncope, orthopnea, palpitations, paroxysmal nocturnal dyspnea and syncope  Respiratory: Negative for cough and shortness of breath  Gastrointestinal: Negative for heartburn and nausea  Neurological: Negative for dizziness, focal weakness, headaches, light-headedness and weakness  Neuropathy   All other systems reviewed and are negative  No Known Allergies    Current Outpatient Medications:     aspirin 81 mg chewable tablet, Chew 1 tablet (81 mg total) daily, Disp: 90 tablet, Rfl: 1    atorvastatin (LIPITOR) 40 mg tablet, Take 1 tablet (40 mg total) by mouth daily, Disp: 30 tablet, Rfl: 5    metFORMIN (GLUCOPHAGE) 1000 MG tablet, Take 1 tablet (1,000 mg total) by mouth in the morning and 1 tablet (1,000 mg total) before bedtime  , Disp: 180 tablet, Rfl: 3    nitroglycerin (NITROSTAT) 0 4 mg SL tablet, Place 1 tablet (0 4 mg total) under the tongue every 5 (five) minutes as needed for chest pain, Disp: 90 tablet, Rfl: 0        Social History     Socioeconomic History    Marital status: Single     Spouse name: Not on file    Number of children: Not on file    Years of education: Not on file    Highest education level: Not on file   Occupational History    Not on file   Tobacco Use    Smoking status: Former Smoker     Packs/day: 1 00     Years: 10 00     Pack years: 10 00     Types: Cigarettes, Cigars     Quit date: 2021     Years since quittin 3    Smokeless tobacco: Never Used   Vaping Use    Vaping Use: Never used   Substance and Sexual Activity    Alcohol use: Not Currently    Drug use: Yes     Types: Marijuana     Comment: "heavily"    Sexual activity: Yes     Partners: Female   Other Topics Concern    Not on file   Social History Narrative    Not working  States that he is on SSI for his diabetes? ? Social Determinants of Health     Financial Resource Strain: Not on file   Food Insecurity: Not on file   Transportation Needs: Not on file   Physical Activity: Not on file   Stress: Not on file   Social Connections: Not on file   Intimate Partner Violence: Not on file   Housing Stability: Not on file       Family History   Problem Relation Age of Onset    Diabetes Mother     Pancreatic cancer Mother     Alcohol abuse Father     No Known Problems Sister     No Known Problems Brother     Mental illness Daughter     No Known Problems Brother     No Known Problems Sister     No Known Problems Sister        Physical Exam  Vitals and nursing note reviewed  Constitutional:       General: He is not in acute distress  HENT:      Head: Normocephalic and atraumatic  Eyes:      Conjunctiva/sclera: Conjunctivae normal    Cardiovascular:      Rate and Rhythm: Normal rate and regular rhythm  Pulses: Intact distal pulses  Heart sounds: Normal heart sounds  Pulmonary:      Effort: Pulmonary effort is normal       Breath sounds: Normal breath sounds  Abdominal:      General: Bowel sounds are normal       Palpations: Abdomen is soft  Musculoskeletal:         General: Normal range of motion        Cervical back: Normal range of motion and neck supple  Skin:     General: Skin is warm and dry  Neurological:      Mental Status: He is alert and oriented to person, place, and time  Vitals: Blood pressure 138/86, pulse 69, height 6' (1 829 m), weight 102 kg (224 lb), SpO2 96 %  Wt Readings from Last 3 Encounters:   22 102 kg (224 lb)   22 102 kg (224 lb)   10/27/21 99 8 kg (220 lb)         Labs & Results:  Lab Results   Component Value Date    WBC 12 33 (H) 2020    HGB 14 0 2020    HCT 42 2 2020    MCV 90 2020     2020     No results found for: BNP  No components found for: CHEM  Troponin I   Date Value Ref Range Status   2020 0 12 (H) <=0 04 ng/mL Final     Comment:       Siemens Chemistry analyzer 99% cutoff is > 0 04 ng/mL in network labs     o cTnI 99% cutoff is useful only when applied to patients in the clinical setting of myocardial ischemia   o cTnI 99% cutoff should be interpreted in the context of clinical history, ECG findings and possibly cardiac imaging to establish correct diagnosis  o cTnI 99% cutoff may be suggestive but clearly not indicative of a coronary event without the clinical setting of myocardial ischemia      Results indicate test should be repeated on new specimen collected within 4-6 hours of the original     Results for orders placed during the hospital encounter of 20    Echo complete with contrast if indicated    Narrative  [address]  [phone]    Transthoracic Echocardiogram  2D, M-mode, Doppler, and Color Doppler    Study date:  2020    Patient: Mili Andrade  MR number: [id number]  Account number: [de-identified]  : 1965  Age: 49 years  Gender: Male  Status: Inpatient  Location: Bedside  Height: 72 in  Weight: 223 5 lb  BP: 145/ 92 mmHg    Indications: MI    Diagnoses: I21 3 - ST elevation (STEMI) myocardial infarction of unspecified site    Sonographer:  Huey Castellon RDCS  Interpreting Physician:  Jimmie Li MD  Referring Physician:  Jimmie Li MD  Group:  [facility]    SUMMARY    LEFT VENTRICLE:  Systolic function was normal   There were no definite regional wall motion abnormalities  Possible mild anteroseptal hypokinesis  Wall thickness was increased  Hypertrophy was noted  Left ventricular diastolic function parameters were normal     TRICUSPID VALVE:  Estimated peak PA pressure was 35 - 40 mmHg  HISTORY: PRIOR HISTORY: DM, Smoker    PROCEDURE: The procedure was performed at the bedside  This was a routine study  The transthoracic approach was used  The study included complete 2D imaging, M-mode, complete spectral Doppler, and color Doppler  The heart rate was 67 bpm,  at the start of the study  Images were obtained from the parasternal, apical, subcostal, and suprasternal notch acoustic windows  Image quality was adequate  LEFT VENTRICLE: Size was normal  Systolic function was normal  There were no definite regional wall motion abnormalities  Possible mild anteroseptal hypokinesis  Wall thickness was increased  Hypertrophy was noted  DOPPLER: The ratio of  early ventricular filling to atrial contraction velocities was within the normal range  Left ventricular diastolic function parameters were normal     RIGHT VENTRICLE: The size was normal  Systolic function was normal     LEFT ATRIUM: The atrium was mildly dilated  RIGHT ATRIUM: The atrium was mildly dilated  MITRAL VALVE: Valve structure was normal  There was normal leaflet separation  DOPPLER: There was no evidence for stenosis  There was trace regurgitation  AORTIC VALVE: The valve was probably trileaflet  Leaflets exhibited normal thickness and normal cuspal separation  DOPPLER: There was no evidence for stenosis  There was no significant regurgitation  TRICUSPID VALVE: The valve structure was normal  There was normal leaflet separation  DOPPLER: There was no evidence for stenosis  There was mild regurgitation  Estimated peak PA pressure was 35 - 40 mmHg  PULMONIC VALVE: Leaflets exhibited normal thickness, no calcification, and normal cuspal separation  DOPPLER: The transpulmonic velocity was within the normal range  There was trace regurgitation  PERICARDIUM: There was no pericardial effusion  The pericardium was normal in appearance  AORTA: The root exhibited normal size  SYSTEM MEASUREMENT TABLES    2D  %FS: 36 46 %  Ao Diam: 2 8 cm  EDV(Teich): 126 07 ml  EF(Teich): 65 86 %  ESV(Teich): 43 03 ml  IVSd: 1 26 cm  LA Area: 18 92 cm2  LA Diam: 3 24 cm  LVEDV MOD A4C: 105 93 ml  LVEF MOD A4C: 62 01 %  LVESV MOD A4C: 40 24 ml  LVIDd: 5 14 cm  LVIDs: 3 27 cm  LVLd A4C: 9 15 cm  LVLs A4C: 7 25 cm  LVPWd: 1 cm  RA Area: 20 84 cm2  RVIDd: 3 43 cm  SV MOD A4C: 65 68 ml  SV(Teich): 83 04 ml    CW  TR MaxP 16 mmHg  TR Vmax: 2 92 m/s    PW  E': 0 09 m/s  E/E': 11 08  MV A Jg: 0 81 m/s  MV Dec Craven: 5 31 m/s2  MV DecT: 180 64 ms  MV E Jg: 0 96 m/s  MV E/A Ratio: 1 19  MV PHT: 52 39 ms  MVA By PHT: 4 2 cm2    [facility]    Prepared and electronically signed by    Bib Dejesus MD  Signed 2020 15:56:52    No results found for this or any previous visit  This note was completed in part utilizing [employer] fluency direct voice recognition software  Grammatical errors, random word insertion, spelling mistakes, and incomplete sentences may be an occasional consequence of the system secondary to software limitations, ambient noise and hardware issues  At the time of dictation, efforts were made to edit, clarify and /or correct errors  Please read the chart carefully and recognize, using context, where substitutions have occurred    If you have any questions or concerns about the context, text or information contained within the body of this dictation, please contact myself, the provider, for further clarification

## 2022-08-22 ENCOUNTER — OFFICE VISIT (OUTPATIENT)
Dept: CARDIOLOGY CLINIC | Facility: CLINIC | Age: 57
End: 2022-08-22
Payer: COMMERCIAL

## 2022-08-22 VITALS
HEART RATE: 69 BPM | WEIGHT: 224 LBS | OXYGEN SATURATION: 96 % | HEIGHT: 72 IN | SYSTOLIC BLOOD PRESSURE: 138 MMHG | DIASTOLIC BLOOD PRESSURE: 86 MMHG | BODY MASS INDEX: 30.34 KG/M2

## 2022-08-22 DIAGNOSIS — I21.02 ACUTE ST ELEVATION MYOCARDIAL INFARCTION (STEMI) INVOLVING LEFT ANTERIOR DESCENDING (LAD) CORONARY ARTERY (HCC): ICD-10-CM

## 2022-08-22 DIAGNOSIS — I25.10 CORONARY ARTERY DISEASE INVOLVING NATIVE CORONARY ARTERY OF NATIVE HEART WITHOUT ANGINA PECTORIS: Primary | ICD-10-CM

## 2022-08-22 DIAGNOSIS — F12.90 MARIJUANA USE: ICD-10-CM

## 2022-08-22 DIAGNOSIS — E11.42 TYPE 2 DIABETES MELLITUS WITH DIABETIC POLYNEUROPATHY, WITHOUT LONG-TERM CURRENT USE OF INSULIN (HCC): ICD-10-CM

## 2022-08-22 DIAGNOSIS — E78.2 MIXED HYPERLIPIDEMIA: ICD-10-CM

## 2022-08-22 PROCEDURE — 93000 ELECTROCARDIOGRAM COMPLETE: CPT | Performed by: NURSE PRACTITIONER

## 2022-08-22 PROCEDURE — 99214 OFFICE O/P EST MOD 30 MIN: CPT | Performed by: NURSE PRACTITIONER

## 2022-08-22 PROCEDURE — 3079F DIAST BP 80-89 MM HG: CPT | Performed by: NURSE PRACTITIONER

## 2022-08-22 PROCEDURE — 3075F SYST BP GE 130 - 139MM HG: CPT | Performed by: NURSE PRACTITIONER

## 2022-08-22 RX ORDER — ATORVASTATIN CALCIUM 40 MG/1
40 TABLET, FILM COATED ORAL DAILY
Qty: 30 TABLET | Refills: 5 | Status: SHIPPED | OUTPATIENT
Start: 2022-08-22

## 2022-08-22 RX ORDER — ASPIRIN 81 MG/1
81 TABLET, CHEWABLE ORAL DAILY
Qty: 90 TABLET | Refills: 1 | Status: SHIPPED | OUTPATIENT
Start: 2022-08-22

## 2022-08-22 NOTE — LETTER
August 22, 2022     Referral [address] [place] [id number]    Patient: Cierra Sorensen   YOB: 1965   Date of Visit: 8/22/2022       Dear Dr Katia Robb:    Thank you for referring Cierra Sorensen to me for evaluation  Below are my notes for this consultation  If you have questions, please do not hesitate to call me  I look forward to following your patient along with you  Sincerely,        ERIC Guerra        CC: MD Alejandro Marte, [facility]  8/22/2022  9:27 AM  Incomplete  Cardiology   Follow Up   Office Visit Note  Cierra Sorensen   55 y o    male   MRN: [id number]  [address]  [address]  [place] [address] [facility] [address]  [phone]    PCP: No primary care provider on file  Cardiologist:   Will be Dr Jaime Germain                Summary of recommendations  Heart healthy diet  Educational information provided  Full labs have been requested by his PCP  He can stop ticagrelor   Start aspirin 81 mg daily   Start atorvastatin 40 mg daily  I recommend he have his labs drawn that have been ordered by his PCP  These include a CBC, CMP and lipid profile  Follow up will be scheduled with Dr Edward Sands within 3 months, new patient visit   He prefers the [facility] office       Assessment/plan  CAD; S/P AWMI, S/P PCI with 2 overlapping HALI prLAD Jan 2020  · on aspirin, high-intensity statin, beta-blocker  · EKG today:Normal sinus rhythm 69 beats per minute nonspecific ST T-wave changes most pronounced inferiorly and anterolaterally  Hyperlipidemia, in the past was was prescribed atorvastatin 80 mg daily  He has not been taking it for unclear reasons for an unknown period of time  Agreeable to restarting today at 40 mg daily  · Lipid panel 1/7/20:  LDL 61, non HDL 69  Diet heart healthy diet recommended  Follow-up lipid profile is pending  Type 2 diabetes mellitus  HgA1c 5 9  5/12/22  On insulin and metformin  Marijuana use daily   Used since 16 yo  Tobacco abuse  Reports he quit smoking tobacco about 13 months ago  He has a 10 pack-year history  Commended  Cardiac testing  · TE 1/6/2020  normal LV size and function, possible mild anteroseptal hypokinesis, LVH, normal RV size and function, PASP 35-40 mm Hg  · cardiac catheterization 1/6/20  Left main: Normal   LAD: The vessel was normal sized  There was a 90% proximal stenosis  This was the culprit for the patient's anterior STEMI  The diagonal branches were free of obstructive disease  Circumflex: The vessel was normal sized and gave rise to two OM brancvhes  There were no significant lesions  RCA: The vessel was normal sized and dominant, giving rise to the PDA and a posterolateral branch  No atherosclerosis was seen  -->Single vessel disease, with a 90% proximal LAD stenosis representing the culprit for the patient's anterior STEMI  Successful IVUS-guided PCI, proximal LAD, with placement of two overlapping drug-eluting stents  Mildly-moderately elevated LVEDP  · TTE 10/27/21  EF 65%  Borderline LVH  Diastolic function normal for age  Mild LAE  Trace AI  Trace MR   Mild-to-moderate TR  PA P 20 5 mm Hg                     HPI  Donavon Burkitt is a 48 yo male with diabetes, tobacco abuse and marijuana use  He presented to [facility] as a pre-hospital MI alert on 1/6/20  That morning he went to the gym and upon returning home he developed 10/10 midsternal substernal chest pain and abdominal pain with radiation to his both his arms, back and abdomen with associated dyspnea  His EKG on arrival showed anterior ST elevation with reciprocal ST changes in the inferior leads  He was taken emergently for coronary angiography  This demonstrated a 90% proximal LAD stenosis  He underwent PCI and deployment of 2 overlapping drug-eluting stents with a good result  An echocardiogram demonstrated preserved LV function    He was placed on dual antiplatelet therapy with aspirin and Brilinta  He was placed on a high-intensity statin  He was advised complete tobacco cessation  Reports he only smokes cigars every other day to help increase the affects of marijuana that he smokes regularly  He is agreeable to stopping smoking cigars but desires to continue marijuana use  His echocardiogram showed preserved LV function possible mild anterior septal hypokinesis and left ventricular hypertrophy  RV size was normal     8/30/21   General cardiology follow-up  This is his 1st office visit since January 2020 when he presented with an anterior MI and received overlapping2 drug-eluting stents to his  Proximal LAD  He has transportation issues  He has no car  He tries to protect himself given the COVID-19 pandemic  He does not like to use public transportation with the bus, etc     Reports where he lives most people are not masking  He did receive the  PfizerCOVID-19 vaccine himself, May 19 and Yulissa 10  Today, he received a ride from [employer]  Unfortunately, they were late  He need to call alternative transportation  Our visit was abbreviated, given this  He is on disability, since around 2007  He works out regularly, at home  He does weights, and has a lot of lifting equipment  He does ride the stationary bike, "for fun"  Sometimes this can be for 10 minutes or 30 minutes or 2 hours  With his usual activity, he denies chest pain, shortness of breath, palpitations lightheadedness or dizziness  He currently has a broken toe on his left foot    He continues to smoke marijuana, daily  He tells me he has chronic lower extremity neuropathy from his diabetes and this helps  He smokes 1 cigar a day  He denies any cigarette use  He is compliant with his medications  He has had refills, per his PCP  For now, he will remain on Brilinta 60 mg b i d  along with his aspirin      Will get an updated echocardiogram   He will return in a short interval, to meet an office cardiologist for the 1st time  He has a full set of labs requested, by his PCP which he anticipates getting very soon  His EKG today in the office showed sinus rhythm 68 beats per minute with normal intervals  Blood pressure by me 132/90 in the left arm  He tells me it was elevated because he was angry about his transportation issues  Will continue to monitor  He does not have equipment at home    Interval history  Never followed up in the office  Has not seen a cardiologist in the office    8/22/22  Overdue follow-up  He saw his PCP last, 5/12/22  Full labs have been ordered  He is no longer taking atorvastatin  He is not taking the metoprolol  He said that we stopped them  I re-educated him, that he needs to follow up in the office regularly in order to get medications refilled, and that we did not stop them  Current medications include ticagrelor 60 mg q 12 and metformin  He was given a short prescription of ticagrelor until re-evaluated in the office  He is not taking the metoprolol, the atorvastatin nor aspirin  He is on disability from [facility] [employer] [facility] related to his diabetes he tells me  He does not work  He has neuropathy and issues with his eyes  He quit smoking tobacco 13 months ago  He was commended  He tells me he was smoking unfiltered cigarettes with a very high nicotine level  He continues to smoke marijuana on a daily basis and has since he was 16years old  He was educated that this could cause a cardiomyopathy  He exercises regularly  He says every day he does a 1000 pushups and 100 pull-ups  He also has a treadmill but does not use that often  He denies chest pain, shortness of breath  lightheadedness or dizziness  I recommended regular follow-up in adherence to medical therapy  He tells me he will get his labs in the near future    He will follow with a cardiologist at the [facility]            I have spent 25 minutes with Patient  today in which greater than 50% of this time was spent in counseling/coordination of care regarding Intructions for management, Patient and family education, Importance of tx compliance, Risk factor reductions and Impressions  Assessment  Diagnoses and all orders for this visit:    Coronary artery disease involving native coronary artery of native heart without angina pectoris  -     Cancel: CBC and Platelet; Future  -     POCT ECG  -     atorvastatin (LIPITOR) 40 mg tablet; Take 1 tablet (40 mg total) by mouth daily    Type 2 diabetes mellitus with diabetic polyneuropathy, without long-term current use of insulin ([facility])    Marijuana use    Mixed hyperlipidemia  -     atorvastatin (LIPITOR) 40 mg tablet; Take 1 tablet (40 mg total) by mouth daily    Acute ST elevation myocardial infarction (STEMI) involving left anterior descending (LAD) coronary artery ([facility])  -     aspirin 81 mg chewable tablet; Chew 1 tablet (81 mg total) daily          Past Medical History:   Diagnosis Date    Acute ST segment elevation myocardial infarction ([facility] )     Hyperlipidemia     Myocardial infarction ([facility])     Neuropathy     Nocturia     Type II diabetes mellitus ([facility])     Vitamin D deficiency        Review of Systems   Constitutional: Negative for chills  Eyes:        Eye issues related to his diabetes he reports   Cardiovascular: Negative for chest pain, claudication, cyanosis, dyspnea on exertion, irregular heartbeat, leg swelling, near-syncope, orthopnea, palpitations, paroxysmal nocturnal dyspnea and syncope  Respiratory: Negative for cough and shortness of breath  Gastrointestinal: Negative for heartburn and nausea  Neurological: Negative for dizziness, focal weakness, headaches, light-headedness and weakness  Neuropathy   All other systems reviewed and are negative  No Known Allergies        Current Outpatient Medications:     aspirin 81 mg chewable tablet, Chew 1 tablet (81 mg total) daily, Disp: 90 tablet, Rfl: 1    atorvastatin (LIPITOR) 40 mg tablet, Take 1 tablet (40 mg total) by mouth daily, Disp: 30 tablet, Rfl: 5    metFORMIN (GLUCOPHAGE) 1000 MG tablet, Take 1 tablet (1,000 mg total) by mouth in the morning and 1 tablet (1,000 mg total) before bedtime  , Disp: 180 tablet, Rfl: 3    nitroglycerin (NITROSTAT) 0 4 mg SL tablet, Place 1 tablet (0 4 mg total) under the tongue every 5 (five) minutes as needed for chest pain, Disp: 90 tablet, Rfl: 0        Social History     Socioeconomic History    Marital status: Single     Spouse name: Not on file    Number of children: Not on file    Years of education: Not on file    Highest education level: Not on file   Occupational History    Not on file   Tobacco Use    Smoking status: Former Smoker     Packs/day:      Years: 10 00     Pack years: 10 00     Types: Cigarettes, Cigars     Quit date: 2021     Years since quittin 3    Smokeless tobacco: Never Used   Vaping Use    Vaping Use: Never used   Substance and Sexual Activity    Alcohol use: Not Currently    Drug use: Yes     Types: Marijuana     Comment: "heavily"    Sexual activity: Yes     Partners: Female   Other Topics Concern    Not on file   Social History Narrative    Not working  States that he is on SSI for his diabetes? ? Social Determinants of Health     Financial Resource Strain: Not on file   Food Insecurity: Not on file   Transportation Needs: Not on file   Physical Activity: Not on file   Stress: Not on file   Social Connections: Not on file   Intimate Partner Violence: Not on file   Housing Stability: Not on file       Family History   Problem Relation Age of Onset    Diabetes Mother     Pancreatic cancer Mother     Alcohol abuse Father     No Known Problems Sister     No Known Problems Brother     Mental illness Daughter     No Known Problems Brother     No Known Problems Sister     No Known Problems Sister        Physical Exam  Vitals and nursing note reviewed     Constitutional:       General: He is not in acute distress  HENT:      Head: Normocephalic and atraumatic  Eyes:      Conjunctiva/sclera: Conjunctivae normal    Cardiovascular:      Rate and Rhythm: Normal rate and regular rhythm  Pulses: Intact distal pulses  Heart sounds: Normal heart sounds  Pulmonary:      Effort: Pulmonary effort is normal       Breath sounds: Normal breath sounds  Abdominal:      General: Bowel sounds are normal       Palpations: Abdomen is soft  Musculoskeletal:         General: Normal range of motion  Cervical back: Normal range of motion and neck supple  Skin:     General: Skin is warm and dry  Neurological:      Mental Status: He is alert and oriented to person, place, and time  Vitals: Blood pressure 138/86, pulse 69, height 6' (1 829 m), weight 102 kg (224 lb), SpO2 96 %  Wt Readings from Last 3 Encounters:   08/22/22 102 kg (224 lb)   05/12/22 102 kg (224 lb)   10/27/21 99 8 kg (220 lb)         Labs & Results:  Lab Results   Component Value Date    WBC 12 33 (H) 01/07/2020    HGB 14 0 01/07/2020    HCT 42 2 01/07/2020    MCV 90 01/07/2020     01/07/2020     No results found for: BNP  No components found for: CHEM  Troponin I   Date Value Ref Range Status   01/06/2020 0 12 (H) <=0 04 ng/mL Final     Comment:       S[employer] Chemistry analyzer 99% cutoff is > 0 04 ng/mL in network labs     o cTnI 99% cutoff is useful only when applied to patients in the clinical setting of myocardial ischemia   o cTnI 99% cutoff should be interpreted in the context of clinical history, ECG findings and possibly cardiac imaging to establish correct diagnosis  o cTnI 99% cutoff may be suggestive but clearly not indicative of a coronary event without the clinical setting of myocardial ischemia      Results indicate test should be repeated on new specimen collected within 4-6 hours of the original     Results for orders placed during the hospital encounter of 01/06/20    Echo complete with contrast if indicated    Narrative  [facility]  [address] [facility], [address] [place] [address]  [phone]    Transthoracic Echocardiogram  2D, M-mode, Doppler, and Color Doppler    Study date:  2020    Patient: Jr Juarez  MR number: [id number]  Account number: [de-identified]  : 1965  Age: 49 years  Gender: Male  Status: Inpatient  Location: Bedside  Height: 72 in  Weight: 223 5 lb  BP: 145/ 92 mmHg    Indications: MI    Diagnoses: I21 3 - ST elevation (STEMI) myocardial infarction of unspecified site    Sonographer:  Pasquale Dash RDCS  Interpreting Physician:  Parminder Pollack MD  Referring Physician:  Parminder Pollack MD  Group:  [facility]    SUMMARY    LEFT VENTRICLE:  Systolic function was normal   There were no definite regional wall motion abnormalities  Possible mild anteroseptal hypokinesis  Wall thickness was increased  Hypertrophy was noted  Left ventricular diastolic function parameters were normal     TRICUSPID VALVE:  Estimated peak PA pressure was 35 - 40 mmHg  HISTORY: PRIOR HISTORY: DM, Smoker    PROCEDURE: The procedure was performed at the bedside  This was a routine study  The transthoracic approach was used  The study included complete 2D imaging, M-mode, complete spectral Doppler, and color Doppler  The heart rate was 67 bpm,  at the start of the study  Images were obtained from the parasternal, apical, subcostal, and suprasternal notch acoustic windows  Image quality was adequate  LEFT VENTRICLE: Size was normal  Systolic function was normal  There were no definite regional wall motion abnormalities  Possible mild anteroseptal hypokinesis  Wall thickness was increased  Hypertrophy was noted  DOPPLER: The ratio of  early ventricular filling to atrial contraction velocities was within the normal range   Left ventricular diastolic function parameters were normal     RIGHT VENTRICLE: The size was normal  Systolic function was normal     LEFT ATRIUM: The atrium was mildly dilated  RIGHT ATRIUM: The atrium was mildly dilated  MITRAL VALVE: Valve structure was normal  There was normal leaflet separation  DOPPLER: There was no evidence for stenosis  There was trace regurgitation  AORTIC VALVE: The valve was probably trileaflet  Leaflets exhibited normal thickness and normal cuspal separation  DOPPLER: There was no evidence for stenosis  There was no significant regurgitation  TRICUSPID VALVE: The valve structure was normal  There was normal leaflet separation  DOPPLER: There was no evidence for stenosis  There was mild regurgitation  Estimated peak PA pressure was 35 - 40 mmHg  PULMONIC VALVE: Leaflets exhibited normal thickness, no calcification, and normal cuspal separation  DOPPLER: The transpulmonic velocity was within the normal range  There was trace regurgitation  PERICARDIUM: There was no pericardial effusion  The pericardium was normal in appearance  AORTA: The root exhibited normal size  SYSTEM MEASUREMENT TABLES    2D  %FS: 36 46 %  Ao Diam: 2 8 cm  EDV(Teich): 126 07 ml  EF(Teich): 65 86 %  ESV(Teich): 43 03 ml  IVSd: 1 26 cm  LA Area: 18 92 cm2  LA Diam: 3 24 cm  LVEDV MOD A4C: 105 93 ml  LVEF MOD A4C: 62 01 %  LVESV MOD A4C: 40 24 ml  LVIDd: 5 14 cm  LVIDs: 3 27 cm  LVLd A4C: 9 15 cm  LVLs A4C: 7 25 cm  LVPWd: 1 cm  RA Area: 20 84 cm2  RVIDd: 3 43 cm  SV MOD A4C: 65 68 ml  SV(Teich): 83 04 ml    CW  TR MaxP 16 mmHg  TR Vmax: 2 92 m/s    PW  E': 0 09 m/s  E/E': 11 08  MV A Jg: 0 81 m/s  MV Dec [place]: 5 31 m/s2  MV DecT: 180 64 ms  MV E Jg: 0 96 m/s  MV E/A Ratio: 1 19  MV PHT: 52 39 ms  MVA By PHT: 4 2 cm2    [employer] Accredited Echocardiography Laboratory    Prepared and electronically signed by    Neptali Sanchez MD  Signed 2020 15:56:52    No results found for this or any previous visit  This note was completed in part utilizing [employer] direct voice recognition software     Grammatical errors, random word insertion, spelling mistakes, and incomplete sentences may be an occasional consequence of the system secondary to software limitations, ambient noise and hardware issues  At the time of dictation, efforts were made to edit, clarify and /or correct errors  Please read the chart carefully and recognize, using context, where substitutions have occurred  If you have any questions or concerns about the context, text or information contained within the body of this dictation, please contact myself, the provider, for further clarification        ERIC Pennington  8/22/2022  9:03 AM  Sign when Signing Visit  Cardiology   Follow Up   Office Visit Note  Tosha Edward   55 y o    male   MRN: [id number]  [address]  [address]  [facility] [address] [facility] [phone] [id number]    PCP: No primary care provider on file  Cardiologist:   Dr Nadira Marie                Summary of recommendations  Heart healthy diet  Educational information provided  Full labs have been requested by his PCP  Follow up will be scheduled with Dr Agustina Lopez within 3 months, new patient visit        Assessment/plan  CAD; S/P AWMI, S/P PCI with 2 overlapping HALI prLAD Jan 2020  · on aspirin, high-intensity statin, beta-blocker  · EKG today:Normal sinus rhythm 69 beats per minute nonspecific ST T-wave changes most pronounced inferiorly and anterolaterally  Hyperlipidemia, in the past was was prescribed atorvastatin 80 mg daily  He has not been taking it for unclear reasons for an unknown period of time  · Lipid panel 1/7/20:  LDL 61, non HDL 69  Diet heart healthy diet recommended  Follow-up lipid profile is pending  Type 2 diabetes mellitus  HgA1c 5 9  5/12/22  On insulin and metformin  Marijuana use daily  Used since 16 yo  Tobacco abuse  Reports he quit smoking tobacco about 13 months ago  He has a 10 pack-year history  Commended  Cardiac testing  · TE 1/6/2020  normal LV size and function, possible mild anteroseptal hypokinesis, LVH, normal RV size and function, PASP 35-40 mm Hg  · cardiac catheterization 1/6/20  Left main: Normal   LAD: The vessel was normal sized  There was a 90% proximal stenosis  This was the culprit for the patient's anterior STEMI  The diagonal branches were free of obstructive disease  Circumflex: The vessel was normal sized and gave rise to two OM brancvhes  There were no significant lesions  RCA: The vessel was normal sized and dominant, giving rise to the PDA and a posterolateral branch  No atherosclerosis was seen  -->Single vessel disease, with a 90% proximal LAD stenosis representing the culprit for the patient's anterior STEMI  Successful IVUS-guided PCI, proximal LAD, with placement of two overlapping drug-eluting stents  Mildly-moderately elevated LVEDP  · TTE 10/27/21  EF 65%  Borderline LVH  Diastolic function normal for age  Mild LAE  Trace AI  Trace MR   Mild-to-moderate TR  PA P 20 5 mm Hg                     HPI  Meghana Bee is a 50 yo male with diabetes, tobacco abuse and marijuana use  He presented to [facility] as a pre-hospital MI alert on 1/6/20  That morning he went to the gym and upon returning home he developed 10/10 midsternal substernal chest pain and abdominal pain with radiation to his both his arms, back and abdomen with associated dyspnea  His EKG on arrival showed anterior ST elevation with reciprocal ST changes in the inferior leads  He was taken emergently for coronary angiography  This demonstrated a 90% proximal LAD stenosis  He underwent PCI and deployment of 2 overlapping drug-eluting stents with a good result  An echocardiogram demonstrated preserved LV function  He was placed on dual antiplatelet therapy with aspirin and Brilinta  He was placed on a high-intensity statin  He was advised complete tobacco cessation    Reports he only smokes cigars every other day to help increase the affects of marijuana that he smokes regularly  He is agreeable to stopping smoking cigars but desires to continue marijuana use  His echocardiogram showed preserved LV function possible mild anterior septal hypokinesis and left ventricular hypertrophy  RV size was normal     8/30/21   General cardiology follow-up  This is his 1st office visit since January 2020 when he presented with an anterior MI and received overlapping2 drug-eluting stents to his  Proximal LAD  He has transportation issues  He has no car  He tries to protect himself given the COVID-19 pandemic  He does not like to use public transportation with the bus, etc     Reports where he lives most people are not masking  He did receive the  PfizerCOVID-19 vaccine himself, May 19 and Yulissa 10  Today, he received a ride from [employer]  Unfortunately, they were late  He need to call alternative transportation  Our visit was abbreviated, given this  He is on disability, since around 2007  He works out regularly, at home  He does weights, and has a lot of lifting equipment  He does ride the stationary bike, "for fun"  Sometimes this can be for 10 minutes or 30 minutes or 2 hours  With his usual activity, he denies chest pain, shortness of breath, palpitations lightheadedness or dizziness  He currently has a broken toe on his left foot    He continues to smoke marijuana, daily  He tells me he has chronic lower extremity neuropathy from his diabetes and this helps  He smokes 1 cigar a day  He denies any cigarette use  He is compliant with his medications  He has had refills, per his PCP  For now, he will remain on Brilinta 60 mg b i d  along with his aspirin  Will get an updated echocardiogram   He will return in a short interval, to meet an office cardiologist for the 1st time  He has a full set of labs requested, by his PCP which he anticipates getting very soon  His EKG today in the office showed sinus rhythm 68 beats per minute with normal intervals  Blood pressure by me 132/90 in the left arm  He tells me it was elevated because he was angry about his transportation issues  Will continue to monitor  He does not have equipment at home    Interval history  Never followed up in the office  Has not seen a cardiologist in the office    8/22/22  Overdue follow-up  He saw his PCP last, 5/12/22  Full labs have been ordered  He is no longer taking atorvastatin for unclear reasons  Current medications include ticagrelor 60 mg q 12  He is not taking the metoprolol, the atorvastatin nor aspirin                I have spent 25 minutes with Patient  today in which greater than 50% of this time was spent in counseling/coordination of care regarding Intructions for management, Patient and family education, Importance of tx compliance, Risk factor reductions and Impressions  Assessment  Diagnoses and all orders for this visit:    Coronary artery disease involving native coronary artery of native heart without angina pectoris    Type 2 diabetes mellitus with diabetic polyneuropathy, without long-term current use of insulin (HCC)    Marijuana use    Mixed hyperlipidemia          Past Medical History:   Diagnosis Date    Acute ST segment elevation myocardial infarction ([facility] [address] )     Hyperlipidemia     Myocardial infarction (HCC)     Neuropathy     Nocturia     Type II diabetes mellitus (HCC)     Vitamin D deficiency        Review of Systems   Constitutional: Negative for chills  Cardiovascular: Negative for chest pain, claudication, cyanosis, dyspnea on exertion, irregular heartbeat, leg swelling, near-syncope, orthopnea, palpitations, paroxysmal nocturnal dyspnea and syncope  Respiratory: Negative for cough and shortness of breath  Gastrointestinal: Negative for heartburn and nausea  Neurological: Negative for dizziness, focal weakness, headaches, light-headedness and weakness  All other systems reviewed and are negative  No Known Allergies        Current Outpatient Medications:     ticagrelor 60 MG, Take 1 tablet (60 mg total) by mouth every 12 (twelve) hours, Disp: 60 tablet, Rfl: 0    aspirin 81 mg chewable tablet, Chew 1 tablet (81 mg total) daily (Patient not taking: Reported on 2022), Disp: 90 tablet, Rfl: 1    atorvastatin (LIPITOR) 80 mg tablet, TAKE 1 TABLET BY MOUTH DAILY WITH DINNER (Patient not taking: Reported on 2022), Disp: 90 tablet, Rfl: 1    metFORMIN (GLUCOPHAGE) 1000 MG tablet, Take 1 tablet (1,000 mg total) by mouth in the morning and 1 tablet (1,000 mg total) before bedtime  , Disp: 180 tablet, Rfl: 3    metoprolol tartrate (LOPRESSOR) 25 mg tablet, TAKE 1 TABLET (25 MG TOTAL) BY MOUTH EVERY 12 (TWELVE) HOURS (Patient not taking: Reported on 2022), Disp: 60 tablet, Rfl: 2    nitroglycerin (NITROSTAT) 0 4 mg SL tablet, Place 1 tablet (0 4 mg total) under the tongue every 5 (five) minutes as needed for chest pain, Disp: 90 tablet, Rfl: 0        Social History     Socioeconomic History    Marital status: Single     Spouse name: Not on file    Number of children: Not on file    Years of education: Not on file    Highest education level: Not on file   Occupational History    Not on file   Tobacco Use    Smoking status: Former Smoker     Packs/day: 1 00     Years: 10 00     Pack years: 10 00     Types: Cigarettes, Cigars     Quit date: 2021     Years since quittin 3    Smokeless tobacco: Never Used   Vaping Use    Vaping Use: Never used   Substance and Sexual Activity    Alcohol use: Not Currently    Drug use: Yes     Types: Marijuana     Comment: "heavily"    Sexual activity: Yes     Partners: Female   Other Topics Concern    Not on file   Social History Narrative    Not working  States that he is on SSI for his diabetes? ?      Social Determinants of Health     Financial Resource Strain: Not on file   Food Insecurity: Not on file   Transportation Needs: Not on file   Physical Activity: Not on file   Stress: Not on file   Social Connections: Not on file   Intimate Partner Violence: Not on file   Housing Stability: Not on file       Family History   Problem Relation Age of Onset    Diabetes Mother     Pancreatic cancer Mother     Alcohol abuse Father     No Known Problems Sister     No Known Problems Brother     Mental illness Daughter     No Known Problems Brother     No Known Problems Sister     No Known Problems Sister        Physical Exam  Vitals and nursing note reviewed  Constitutional:       General: He is not in acute distress  HENT:      Head: Normocephalic and atraumatic  Eyes:      Conjunctiva/sclera: Conjunctivae normal    Cardiovascular:      Rate and Rhythm: Normal rate and regular rhythm  Pulses: Intact distal pulses  Heart sounds: Normal heart sounds  Pulmonary:      Effort: Pulmonary effort is normal       Breath sounds: Normal breath sounds  Abdominal:      General: Bowel sounds are normal       Palpations: Abdomen is soft  Musculoskeletal:         General: Normal range of motion  Cervical back: Normal range of motion and neck supple  Skin:     General: Skin is warm and dry  Neurological:      Mental Status: He is alert and oriented to person, place, and time  Vitals: There were no vitals taken for this visit     Wt Readings from Last 3 Encounters:   05/12/22 102 kg (224 lb)   10/27/21 99 8 kg (220 lb)   08/30/21 99 8 kg (220 lb)         Labs & Results:  Lab Results   Component Value Date    WBC 12 33 (H) 01/07/2020    HGB 14 0 01/07/2020    HCT 42 2 01/07/2020    MCV 90 01/07/2020     01/07/2020     No results found for: BNP  No components found for: CHEM  Troponin I   Date Value Ref Range Status   01/06/2020 0 12 (H) <=0 04 ng/mL Final     Comment:       Siemens Chemistry analyzer 99% cutoff is > 0 04 ng/mL in network labs     o cTnI 99% cutoff is useful only when applied to patients in the clinical setting of myocardial ischemia   o cTnI 99% cutoff should be interpreted in the context of clinical history, ECG findings and possibly cardiac imaging to establish correct diagnosis  o cTnI 99% cutoff may be suggestive but clearly not indicative of a coronary event without the clinical setting of myocardial ischemia  Results indicate test should be repeated on new specimen collected within 4-6 hours of the original     Results for orders placed during the hospital encounter of 20    Echo complete with contrast if indicated    Narrative  [facility], [address] [facility]  [phone]    Transthoracic Echocardiogram  2D, M-mode, Doppler, and Color Doppler    Study date:  2020    Patient: Queenie Thomas  MR number: [id number]  Account number: [de-identified]  : 1965  Age: 49 years  Gender: Male  Status: Inpatient  Location: Bedside  Height: 72 in  Weight: 223 5 lb  BP: 145/ 92 mmHg    Indications: MI    Diagnoses: I21 3 - ST elevation (STEMI) myocardial infarction of unspecified site    Sonographer:  Shaka Pacheco RDCS  Interpreting Physician:  Elian Sanchez MD  Referring Physician:  Elian Sanchez MD  Group:  [facility]    SUMMARY    LEFT VENTRICLE:  Systolic function was normal   There were no definite regional wall motion abnormalities  Possible mild anteroseptal hypokinesis  Wall thickness was increased  Hypertrophy was noted  Left ventricular diastolic function parameters were normal     TRICUSPID VALVE:  Estimated peak PA pressure was 35 - 40 mmHg  HISTORY: PRIOR HISTORY: DM, Smoker    PROCEDURE: The procedure was performed at the bedside  This was a routine study  The transthoracic approach was used  The study included complete 2D imaging, M-mode, complete spectral Doppler, and color Doppler  The heart rate was 67 bpm,  at the start of the study  Images were obtained from the parasternal, apical, subcostal, and suprasternal notch acoustic windows  Image quality was adequate      LEFT VENTRICLE: Size was normal  Systolic function was normal  There were no definite regional wall motion abnormalities  Possible mild anteroseptal hypokinesis  Wall thickness was increased  Hypertrophy was noted  DOPPLER: The ratio of  early ventricular filling to atrial contraction velocities was within the normal range  Left ventricular diastolic function parameters were normal     RIGHT VENTRICLE: The size was normal  Systolic function was normal     LEFT ATRIUM: The atrium was mildly dilated  RIGHT ATRIUM: The atrium was mildly dilated  MITRAL VALVE: Valve structure was normal  There was normal leaflet separation  DOPPLER: There was no evidence for stenosis  There was trace regurgitation  AORTIC VALVE: The valve was probably trileaflet  Leaflets exhibited normal thickness and normal cuspal separation  DOPPLER: There was no evidence for stenosis  There was no significant regurgitation  TRICUSPID VALVE: The valve structure was normal  There was normal leaflet separation  DOPPLER: There was no evidence for stenosis  There was mild regurgitation  Estimated peak PA pressure was 35 - 40 mmHg  PULMONIC VALVE: Leaflets exhibited normal thickness, no calcification, and normal cuspal separation  DOPPLER: The transpulmonic velocity was within the normal range  There was trace regurgitation  PERICARDIUM: There was no pericardial effusion  The pericardium was normal in appearance  AORTA: The root exhibited normal size      SYSTEM MEASUREMENT TABLES    2D  %FS: 36 46 %  Ao Diam: 2 8 cm  EDV(Teich): 126 07 ml  EF(Teich): 65 86 %  ESV(Teich): 43 03 ml  IVSd: 1 26 cm  LA Area: 18 92 cm2  LA Diam: 3 24 cm  LVEDV MOD A4C: 105 93 ml  LVEF MOD A4C: 62 01 %  LVESV MOD A4C: 40 24 ml  LVIDd: 5 14 cm  LVIDs: 3 27 cm  LVLd A4C: 9 15 cm  LVLs A4C: 7 25 cm  LVPWd: 1 cm  RA Area: 20 84 cm2  RVIDd: 3 43 cm  SV MOD A4C: 65 68 ml  SV(Teich): 83 04 ml    CW  TR MaxP 16 mmHg  TR Vmax: 2 92 m/s    PW  E': 0 09 m/s  E/E': 11 08  MV A Jg: 0 81 m/s  MV Dec Barnes: 5 31 m/s2  MV DecT: 180 64 ms  MV E Jg: 0 96 m/s  MV E/A Ratio: 1 19  MV PHT: 52 39 ms  MVA By PHT: 4 2 cm2    [address] Accredited Echocardiography Laboratory    Prepared and electronically signed by    Chasidy Galloway MD  Signed 06-Jan-2020 15:56:52    No results found for this or any previous visit  This note was completed in part utilizing m-[employer] direct voice recognition software  Grammatical errors, random word insertion, spelling mistakes, and incomplete sentences may be an occasional consequence of the system secondary to software limitations, ambient noise and hardware issues  At the time of dictation, efforts were made to edit, clarify and /or correct errors  Please read the chart carefully and recognize, using context, where substitutions have occurred    If you have any questions or concerns about the context, text or information contained within the body of this dictation, please contact myself, the provider, for further clarification

## 2022-08-22 NOTE — PATIENT INSTRUCTIONS
DASH Eating Plan   WHAT YOU NEED TO KNOW:   The DASH (Dietary Approaches to Stop Hypertension) Eating Plan is designed to help prevent or lower high blood pressure  It can also help to lower LDL (bad) cholesterol and decrease your risk for heart disease  The plan is low in sodium, sugar, unhealthy fats, and total fat  It is high in potassium, calcium, magnesium, and fiber  These nutrients are added when you eat more fruits, vegetables, and whole grains  With the DASH eating plan, you need to eat a certain number of servings from each food group  This will help you get enough of certain nutrients and limit others  The amount of servings you should eat depends on how many calories you need  Your dietitian can help you create meal plans with the right number of servings for each food group  DISCHARGE INSTRUCTIONS:   What you need to know about sodium:  Your dietitian will tell you how much sodium is safe for you to have each day  People with high blood pressure should have no more than 1,500 to 2,300 mg of sodium in a day  A teaspoon (tsp) of salt has 2,300 mg of sodium  This may seem like a difficult goal, but small changes to the foods you eat can make a big difference  Your healthcare provider or dietitian can help you create a meal plan that follows your sodium limit  Read food labels  Food labels can help you choose foods that are low in sodium  The amount of sodium is listed in milligrams (mg)  The % Daily Value (DV) column tells you how much of your daily needs are met by 1 serving of the food for each nutrient listed  Choose foods that have less than 5% of the DV of sodium  These foods are considered low in sodium  Foods that have 20% or more of the DV of sodium are considered high in sodium  Avoid foods that have more than 300 mg of sodium in each serving  Choose foods that say low-sodium, reduced-sodium, or no salt added on the food label  Limit added salt    Do not salt food at the table if you add salt when you cook  Use herbs and spices, such as onions, garlic, and salt-free seasonings to add flavor  Try lemon or lime juice or vinegar to add a tart flavor  Use hot peppers or a small amount of hot pepper sauce to add a spicy flavor  Limit foods high in added salt, such as the following:    Seasonings made with salt, such as garlic salt, celery salt, onion salt, seasoned salt, meat tenderizers, and monosodium glutamate (MSG)    Miso soup and canned or dried soup mixes    Regular soy sauce, barbecue sauce, teriyaki sauce, steak sauce, Worcestershire sauce, and most flavored vinegars    Snack foods, such as salted chips, popcorn, pretzels, pork rinds, salted crackers, and salted nuts    Frozen foods, such as dinners, entrees, vegetables with sauces, and breaded meats    Ask about salt substitutes  Ask your healthcare provider if you may use salt substitutes  Some salt substitutes have ingredients that can be harmful if you have certain health conditions  Choose foods carefully at restaurants  Meals from restaurants, especially fast food restaurants, are often high in sodium  Some restaurants have nutrition information that tells you the amount of sodium in their foods  Ask to have your food prepared with less, or no salt  What you need to know about fats:  Healthy fats include unsaturated fats and omega-3 fatty acids  Unhealthy fats include saturated fats and trans fats    Include healthy fats, such as the following:      Cooking oils, such as soybean, canola, olive, or sunflower    Fatty fish, such as salmon, tuna, mackerel, or sardines    Flaxseed oil or ground flaxseed    ½ cup of cooked beans, such as black beans, kidney beans, or yun beans    1½ ounces of low-sodium nuts, such as almonds or walnuts    Low-sugar, low-sodium peanut butter    Seeds such as luis seeds or sunflower seeds       Limit or do not have unhealthy fats, such as the following:      Foods that contain fat from animals, such as fatty meats, whole milk, butter, and cream    Shortening, stick margarine, palm oil, and coconut oil    Full-fat or creamy salad dressing    Creamy soup    Crackers, chips, and baked goods made with margarine or shortening    Foods that are fried in unhealthy fats    Gravy and sauces, such as [place] or cheese sauces    What you need to know about carbohydrates (carbs): All carbs break down into sugar  Complex carbs contain more fiber than simple carbs  This means complex carbs go into the bloodstream more slowly and cause less of a blood sugar spike  Try to include more complex carbs and fewer simple carbs  Include complex carbs, such as the followin slice of whole-grain bread    1 ounce of dry cereal that does not contain added sugar    ½ cup of cooked oatmeal    2 ounces of cooked whole-grain pasta    ½ cup of cooked brown rice    Limit or do not have simple carbs, such as the following:      [employer], such as doughnuts, pastries, and cookies    Mixes for cornbread and biscuits    White rice and pasta mixes, such as boxed macaroni and cheese    Instant and cold cereals that contain sugar    Jelly, jam, and ice cream that contain sugar    Condiments such as ketchup    Drinks high in sugar, such as soft drinks, lemonade, and fruit juice    What you need to know about vegetables and fruits:  Vegetables and fruits can be fresh, frozen, or canned  If possible, try to choose low-sodium canned options    Include a variety of vegetables and fruits, such as the followin medium apple, pear, or peach (about ½ cup chopped)    ½ small banana    ½ cup berries, such as blueberries, strawberries, or blackberries    1 cup of raw leafy greens, such as lettuce, spinach, kale, or hilario greens    ½ cup of frozen or canned (no added salt) vegetables, such as green beans    ½ cup of fresh, frozen, or canned fruit (canned in light syrup or fruit juice)    ½ cup of vegetable or fruit juice    Limit or do not have vegetables and fruits made in the following ways:      Frozen fruit such as cherries that have added sugar    Fruit in cream or butter sauce    Canned vegetables that are high in sodium    Sauerkraut, pickled vegetables, and other foods prepared in brine    Fried vegetables or vegetables in butter or high-fat sauces    What you need to know about protein foods: Include lean or low-fat protein foods, such as the following:      Poultry (chicken, turkey) with no skin    Fish (especially fatty fish, such as salmon, fresh tuna, or mackerel)    Lean beef and pork (loin, round, extra lean hamburger)    Egg whites and egg substitutes    1 cup of nonfat (skim) or 1% milk    1½ ounces of fat-free or low-fat cheese    6 ounces of nonfat or low-fat yogurt    Limit or do not have high-fat protein foods, such as the following:      Smoked or cured meat, such as corned beef, olson, ham, hot dogs, and sausage    Canned beans and canned meats or spreads, such as potted meats, sardines, anchovies, and imitation seafood    Deli or lunch meats, such as bologna, ham, turkey, and roast beef    High-fat meat (T-bone steak, regular hamburger, and ribs)    Whole eggs and egg yolks    Whole milk, 2% milk, and cream    Regular cheese and processed cheese    Other guidelines to follow:   Maintain a healthy weight  Your risk for heart disease is higher if you are overweight  Your healthcare provider may suggest that you lose weight if you are overweight  You can lose weight by eating fewer calories and foods that have added sugars and fat  The DASH meal plan can help you do this  Decrease calories by eating smaller portions at each meal and fewer snacks  Ask your healthcare provider for more information about how to lose weight  Exercise regularly  Regular exercise can help you reach or maintain a healthy weight  Regular exercise can also help decrease your blood pressure and improve your cholesterol levels   Get 30 minutes or more of moderate exercise each day of the week  To lose weight, get at least 60 minutes of exercise  Talk to your healthcare provider about the best exercise program for you  Limit alcohol  Women should limit alcohol to 1 drink a day  Men should limit alcohol to 2 drinks a day  A drink of alcohol is 12 ounces of beer, 5 ounces of wine, or 1½ ounces of liquor  For more information:   [employer] [facility] and [address]  [address] [address]  [place] [address]  Phone: 1- 670 - 512-2167  Web Address: [facility] no    © [facility] 2022 Information is for End User's use only and may not be sold, redistributed or otherwise used for commercial purposes  All illustrations and images included in CareNotes® are the copyrighted property of A D A M , Inc  or [facility] Yaya Smith   The above information is an  only  It is not intended as medical advice for individual conditions or treatments  Talk to your doctor, nurse or pharmacist before following any medical regimen to see if it is safe and effective for you  Cigarette Smoking and Your Health   AMBULATORY CARE:   Risks to your health if you smoke:  Nicotine and other chemicals found in tobacco and e-cigarettes can damage every cell in your body  Even if you are a light smoker, you have an increased risk for cancer, heart disease, and lung disease  If you are pregnant or have diabetes, smoking increases your risk for complications  Nicotine can affect an adolescent's developing brain  This can lead to trouble thinking, learning, or paying attention  Benefits to your health if you stop smoking: You decrease respiratory symptoms such as coughing, wheezing, and shortness of breath  You reduce your risk for cancers of the lung, mouth, throat, kidney, bladder, pancreas, stomach, and cervix  If you already have cancer, you increase the benefits of chemotherapy   You also reduce your risk for cancer returning or a second cancer from developing  You reduce your risk for heart disease, blood clots, heart attack, and stroke  You reduce your risk for lung infections, and diseases such as pneumonia, asthma, chronic bronchitis, and emphysema  Your circulation improves  More oxygen can be delivered to your body  If you have diabetes, you lower your risk for complications, such as kidney, artery, and eye diseases  You also lower your risk for nerve damage  Nerve damage can lead to amputations, poor vision, and blindness  You improve your body's ability to heal and to fight infections  An adolescent can help his or her brain and body develop in a healthy way  Talk to your adolescent about all the health risks of nicotine  If you can, start talking about nicotine when your child is younger than 12 years  This may make it easier for him or her not to start using nicotine as a teenager or adult  Explain to him or her that it is best never to start  It can be hard to try to quit later  Benefits to the health of others if you stop smoking:  Tobacco is harmful to nonsmokers who breathe in your secondhand smoke  The following are ways the health of others around you may improve when you stop smoking: You lower the risks for lung cancer and heart disease in nonsmoking adults  If you are pregnant, you lower the risk for miscarriage, early delivery, low birth weight, and stillbirth  You also lower your baby's risk for SIDS, obesity, developmental delay, and neurobehavioral problems, such as ADHD  If you have children, you lower their risk for ear infections, colds, pneumonia, bronchitis, and asthma  Follow up with your doctor as directed:  Write down your questions so you remember to ask them during your visits  For support and more information:   American [employer] Association  [address] [facility]   [address]  Phone: 1- 300 - 493-9309  Phone: 9- 543 - 682-9670  Web Address: Raisa balderrama    Smokefree  gov  Phone: 4- 274 - 060-3283  Web Address: www smokefree  [facility] 2022 Information is for End User's use only and may not be sold, redistributed or otherwise used for commercial purposes  All illustrations and images included in CareNotes® are the copyrighted property of A D A M , Inc  or [facility] Yaya Smith   The above information is an  only  It is not intended as medical advice for individual conditions or treatments  Talk to your doctor, nurse or pharmacist before following any medical regimen to see if it is safe and effective for you

## 2022-10-18 ENCOUNTER — VBI (OUTPATIENT)
Dept: ADMINISTRATIVE | Facility: OTHER | Age: 57
End: 2022-10-18

## 2022-11-28 ENCOUNTER — VBI (OUTPATIENT)
Dept: ADMINISTRATIVE | Facility: OTHER | Age: 57
End: 2022-11-28

## 2022-11-28 NOTE — TELEPHONE ENCOUNTER
11/28/22 2:13 PM     VB [employer] SmartForm used to document caregap status      [facility] [place]

## 2022-11-30 ENCOUNTER — OFFICE VISIT (OUTPATIENT)
Dept: CARDIOLOGY CLINIC | Facility: CLINIC | Age: 57
End: 2022-11-30

## 2022-11-30 VITALS
HEIGHT: 72 IN | BODY MASS INDEX: 30.2 KG/M2 | TEMPERATURE: 96.5 F | WEIGHT: 223 LBS | SYSTOLIC BLOOD PRESSURE: 110 MMHG | HEART RATE: 77 BPM | DIASTOLIC BLOOD PRESSURE: 69 MMHG | OXYGEN SATURATION: 98 %

## 2022-11-30 DIAGNOSIS — E78.2 MIXED HYPERLIPIDEMIA: ICD-10-CM

## 2022-11-30 DIAGNOSIS — E11.42 TYPE 2 DIABETES MELLITUS WITH DIABETIC POLYNEUROPATHY, WITHOUT LONG-TERM CURRENT USE OF INSULIN (HCC): ICD-10-CM

## 2022-11-30 DIAGNOSIS — I25.10 CORONARY ARTERY DISEASE INVOLVING NATIVE CORONARY ARTERY OF NATIVE HEART WITHOUT ANGINA PECTORIS: Primary | ICD-10-CM

## 2022-11-30 DIAGNOSIS — F12.90 MARIJUANA USE: ICD-10-CM
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
## 2022-11-30 NOTE — PATIENT INSTRUCTIONS
You were seen today in the Cardiology office for follow up  Please continue aspirin and atorvastatin  Please have your blood work including lipid panel performed  Thank you for choosing [address]  Please call our office or use [employer] with any questions

## 2023-02-25 ENCOUNTER — VBI (OUTPATIENT)
Dept: ADMINISTRATIVE | Facility: OTHER | Age: 58
End: 2023-02-25

## 2023-03-07 ENCOUNTER — TELEPHONE (OUTPATIENT)
Dept: CARDIOLOGY CLINIC | Facility: CLINIC | Age: 58
End: 2023-03-07

## 2023-03-07 DIAGNOSIS — I25.10 CORONARY ARTERY DISEASE INVOLVING NATIVE CORONARY ARTERY OF NATIVE HEART WITHOUT ANGINA PECTORIS: ICD-10-CM

## 2023-03-07 DIAGNOSIS — E78.2 MIXED HYPERLIPIDEMIA: ICD-10-CM

## 2023-03-07 DIAGNOSIS — I21.02 ACUTE ST ELEVATION MYOCARDIAL INFARCTION (STEMI) INVOLVING LEFT ANTERIOR DESCENDING (LAD) CORONARY ARTERY (HCC): ICD-10-CM

## 2023-03-07 RX ORDER — ATORVASTATIN CALCIUM 40 MG/1
40 TABLET, FILM COATED ORAL DAILY
Qty: 30 TABLET | Refills: 5 | Status: SHIPPED | OUTPATIENT
Start: 2023-03-07

## 2023-03-07 RX ORDER — ATORVASTATIN CALCIUM 40 MG/1
40 TABLET, FILM COATED ORAL DAILY
Qty: 30 TABLET | Refills: 5 | Status: CANCELLED | OUTPATIENT
Start: 2023-03-07

## 2023-03-07 RX ORDER — ASPIRIN 81 MG/1
81 TABLET, CHEWABLE ORAL DAILY
Qty: 90 TABLET | Refills: 1 | Status: SHIPPED | OUTPATIENT
Start: 2023-03-07

## 2023-03-07 NOTE — TELEPHONE ENCOUNTER
Requested medication(s) are due for refill today:yes  Patient has already received a courtesy refill: no  Other reason request has been forwarded to provider: script failed

## 2023-03-07 NOTE — TELEPHONE ENCOUNTER
Pt called in stating that his Lipitor refill has been canceled and hes not sure why, I did sched him for his 6 month f/u in May with Dr Hasmukh Her  Pt would like a call back

## 2023-05-15 ENCOUNTER — TELEPHONE (OUTPATIENT)
Dept: OTHER | Facility: OTHER | Age: 58
End: 2023-05-15

## 2023-05-15 NOTE — TELEPHONE ENCOUNTER
I called patient and let him know that if he didn't call us it wasn't scheduled  I rescheduled his appointment for Wednesday at 9 can we call and schedule the lyft for him

## 2023-05-15 NOTE — TELEPHONE ENCOUNTER
Patient calling, he has a 0900 appt today  He wants to make sure his Lyft ride was scheduled  Please call him back as soon as possible

## 2023-05-17 ENCOUNTER — OFFICE VISIT (OUTPATIENT)
Dept: FAMILY MEDICINE CLINIC | Facility: CLINIC | Age: 58
End: 2023-05-17

## 2023-05-17 VITALS
BODY MASS INDEX: 29.12 KG/M2 | HEART RATE: 55 BPM | DIASTOLIC BLOOD PRESSURE: 78 MMHG | HEIGHT: 72 IN | OXYGEN SATURATION: 99 % | WEIGHT: 215 LBS | SYSTOLIC BLOOD PRESSURE: 114 MMHG | TEMPERATURE: 97.3 F

## 2023-05-17 DIAGNOSIS — E11.42 TYPE 2 DIABETES MELLITUS WITH DIABETIC POLYNEUROPATHY, WITHOUT LONG-TERM CURRENT USE OF INSULIN (HCC): ICD-10-CM

## 2023-05-17 DIAGNOSIS — Z12.5 SCREENING FOR PROSTATE CANCER: ICD-10-CM

## 2023-05-17 DIAGNOSIS — E78.2 MIXED HYPERLIPIDEMIA: ICD-10-CM

## 2023-05-17 DIAGNOSIS — Z00.00 ANNUAL PHYSICAL EXAM: Primary | ICD-10-CM

## 2023-05-17 LAB
CREAT UR-MCNC: 125 MG/DL
MICROALBUMIN UR-MCNC: 10.8 MG/L (ref 0–20)
MICROALBUMIN/CREAT 24H UR: 9 MG/G CREATININE (ref 0–30)

## 2023-05-17 NOTE — ASSESSMENT & PLAN NOTE
CPE done, routine labs and prostate cancer screening ordered  Has decreased vision encouraged to f/u with opthalmology  Discussed continued healthy eating habits and regular exercise

## 2023-05-17 NOTE — PATIENT INSTRUCTIONS
Wellness Visit for Adults   AMBULATORY CARE:   A wellness visit  is when you see your healthcare provider to get screened for health problems  Your healthcare provider will also give you advice on how to stay healthy  Write down your questions so you remember to ask them  Ask your healthcare provider how often you should have a wellness visit  What happens at a wellness visit:  Your healthcare provider will ask about your health, and your family history of health problems  This includes high blood pressure, heart disease, and cancer  He or she will ask if you have symptoms that concern you, if you smoke, and about your mood  You may also be asked about your intake of medicines, supplements, food, and alcohol  Any of the following may be done:  • Your weight  will be checked  Your height may also be checked so your body mass index (BMI) can be calculated  Your BMI shows if you are at a healthy weight  • Your blood pressure  and heart rate will be checked  Your temperature may also be checked  • Blood and urine tests  may be done  Blood tests may be done to check your cholesterol levels  Abnormal cholesterol levels increase your risk for heart disease and stroke  You may also need a blood or urine test to check for diabetes if you are at increased risk  Urine tests may be done to look for signs of an infection or kidney disease  • A physical exam  includes checking your heartbeat and lungs with a stethoscope  Your healthcare provider may also check your skin to look for sun damage  • Screening tests  may be recommended  A screening test is done to check for diseases that may not cause symptoms  The screening tests you may need depend on your age, gender, family history, and lifestyle habits  For example, colorectal screening may be recommended if you are 48years old or older  Screening tests you need if you are a woman:   • A Pap smear  is used to screen for cervical cancer   Pap smears are usually done every 3 to 5 years depending on your age  You may need them more often if you have had abnormal Pap smear test results in the past  Ask your healthcare provider how often you should have a Pap smear  • A mammogram  is an x-ray of your breasts to screen for breast cancer  Experts recommend mammograms every 2 years starting at age 48 years  You may need a mammogram at age 52 years or younger if you have an increased risk for breast cancer  Talk to your healthcare provider about when you should start having mammograms and how often you need them  Vaccines you may need:   • Get an influenza vaccine  every year  The influenza vaccine protects you from the flu  Several types of viruses cause the flu  The viruses change over time, so new vaccines are made each year  • Get a tetanus-diphtheria (Td) booster vaccine  every 10 years  This vaccine protects you against tetanus and diphtheria  Tetanus is a severe infection that may cause painful muscle spasms and lockjaw  Diphtheria is a severe bacterial infection that causes a thick covering in the back of your mouth and throat  • Get a human papillomavirus (HPV) vaccine  if you are female and aged 23 to 32 or male 23 to 24 and never received it  This vaccine protects you from HPV infection  HPV is the most common infection spread by sexual contact  HPV may also cause vaginal, penile, and anal cancers  • Get a pneumococcal vaccine  if you are aged 72 years or older  The pneumococcal vaccine is an injection given to protect you from pneumococcal disease  Pneumococcal disease is an infection caused by pneumococcal bacteria  The infection may cause pneumonia, meningitis, or an ear infection  • Get a shingles vaccine  if you are 60 or older, even if you have had shingles before  The shingles vaccine is an injection to protect you from the varicella-zoster virus  This is the same virus that causes chickenpox   Shingles is a painful rash that develops in people who had chickenpox or have been exposed to the virus  How to eat healthy:  My Plate is a model for planning healthy meals  It shows the types and amounts of foods that should go on your plate  Fruits and vegetables make up about half of your plate, and grains and protein make up the other half  A serving of dairy is included on the side of your plate  The amount of calories and serving sizes you need depends on your age, gender, weight, and height  Examples of healthy foods are listed below:  • Eat a variety of vegetables  such as dark green, red, and orange vegetables  You can also include canned vegetables low in sodium (salt) and frozen vegetables without added butter or sauces  • Eat a variety of fresh fruits , canned fruit in 100% juice, frozen fruit, and dried fruit  • Include whole grains  At least half of the grains you eat should be whole grains  Examples include whole-wheat bread, wheat pasta, brown rice, and whole-grain cereals such as oatmeal     • Eat a variety of protein foods such as seafood (fish and shellfish), lean meat, and poultry without skin (turkey and chicken)  Examples of lean meats include pork leg, shoulder, or tenderloin, and beef round, sirloin, tenderloin, and extra lean ground beef  Other protein foods include eggs and egg substitutes, beans, peas, soy products, nuts, and seeds  • Choose low-fat dairy products such as skim or 1% milk or low-fat yogurt, cheese, and cottage cheese  • Limit unhealthy fats  such as butter, hard margarine, and shortening  Exercise:  Exercise at least 30 minutes per day on most days of the week  Some examples of exercise include walking, biking, dancing, and swimming  You can also fit in more physical activity by taking the stairs instead of the elevator or parking farther away from stores  Include muscle strengthening activities 2 days each week  Regular exercise provides many health benefits   It helps you manage your weight, and decreases your risk for type 2 diabetes, heart disease, stroke, and high blood pressure  Exercise can also help improve your mood  Ask your healthcare provider about the best exercise plan for you  General health and safety guidelines:   • Do not smoke  Nicotine and other chemicals in cigarettes and cigars can cause lung damage  Ask your healthcare provider for information if you currently smoke and need help to quit  E-cigarettes or smokeless tobacco still contain nicotine  Talk to your healthcare provider before you use these products  • Limit alcohol  A drink of alcohol is 12 ounces of beer, 5 ounces of wine, or 1½ ounces of liquor  • Lose weight, if needed  Being overweight increases your risk of certain health conditions  These include heart disease, high blood pressure, type 2 diabetes, and certain types of cancer  • Protect your skin  Do not sunbathe or use tanning beds  Use sunscreen with a SPF 15 or higher  Apply sunscreen at least 15 minutes before you go outside  Reapply sunscreen every 2 hours  Wear protective clothing, hats, and sunglasses when you are outside  • Drive safely  Always wear your seatbelt  Make sure everyone in your car wears a seatbelt  A seatbelt can save your life if you are in an accident  Do not use your cell phone when you are driving  This could distract you and cause an accident  Pull over if you need to make a call or send a text message  • Practice safe sex  Use latex condoms if are sexually active and have more than one partner  Your healthcare provider may recommend screening tests for sexually transmitted infections (STIs)  • Wear helmets, lifejackets, and protective gear  Always wear a helmet when you ride a bike or motorcycle, go skiing, or play sports that could cause a head injury  Wear protective equipment when you play sports  Wear a lifejacket when you are on a boat or doing water sports      © Copyright [employer] 2022 Information is for End User's use only and may not be sold, redistributed or otherwise used for commercial purposes  The above information is an  only  It is not intended as medical advice for individual conditions or treatments  Talk to your doctor, nurse or pharmacist before following any medical regimen to see if it is safe and effective for you  Wellness Visit for Adults   AMBULATORY CARE:   A wellness visit  is when you see your healthcare provider to get screened for health problems  Your healthcare provider will also give you advice on how to stay healthy  Write down your questions so you remember to ask them  Ask your healthcare provider how often you should have a wellness visit  What happens at a wellness visit:  Your healthcare provider will ask about your health, and your family history of health problems  This includes high blood pressure, heart disease, and cancer  He or she will ask if you have symptoms that concern you, if you smoke, and about your mood  You may also be asked about your intake of medicines, supplements, food, and alcohol  Any of the following may be done:  • Your weight  will be checked  Your height may also be checked so your body mass index (BMI) can be calculated  Your BMI shows if you are at a healthy weight  • Your blood pressure  and heart rate will be checked  Your temperature may also be checked  • Blood and urine tests  may be done  Blood tests may be done to check your cholesterol levels  Abnormal cholesterol levels increase your risk for heart disease and stroke  You may also need a blood or urine test to check for diabetes if you are at increased risk  Urine tests may be done to look for signs of an infection or kidney disease  • A physical exam  includes checking your heartbeat and lungs with a stethoscope  Your healthcare provider may also check your skin to look for sun damage  • Screening tests  may be recommended   A screening test is done to check for diseases that may not cause symptoms  The screening tests you may need depend on your age, gender, family history, and lifestyle habits  For example, colorectal screening may be recommended if you are 48years old or older  Screening tests you need if you are a woman:   • A Pap smear  is used to screen for cervical cancer  Pap smears are usually done every 3 to 5 years depending on your age  You may need them more often if you have had abnormal Pap smear test results in the past  Ask your healthcare provider how often you should have a Pap smear  • A mammogram  is an x-ray of your breasts to screen for breast cancer  Experts recommend mammograms every 2 years starting at age 48 years  You may need a mammogram at age 52 years or younger if you have an increased risk for breast cancer  Talk to your healthcare provider about when you should start having mammograms and how often you need them  Vaccines you may need:   • Get an influenza vaccine  every year  The influenza vaccine protects you from the flu  Several types of viruses cause the flu  The viruses change over time, so new vaccines are made each year  • Get a tetanus-diphtheria (Td) booster vaccine  every 10 years  This vaccine protects you against tetanus and diphtheria  Tetanus is a severe infection that may cause painful muscle spasms and lockjaw  Diphtheria is a severe bacterial infection that causes a thick covering in the back of your mouth and throat  • Get a human papillomavirus (HPV) vaccine  if you are female and aged 23 to 32 or male 23 to 24 and never received it  This vaccine protects you from HPV infection  HPV is the most common infection spread by sexual contact  HPV may also cause vaginal, penile, and anal cancers  • Get a pneumococcal vaccine  if you are aged 72 years or older  The pneumococcal vaccine is an injection given to protect you from pneumococcal disease   Pneumococcal disease is an infection caused by pneumococcal bacteria  The infection may cause pneumonia, meningitis, or an ear infection  • Get a shingles vaccine  if you are 60 or older, even if you have had shingles before  The shingles vaccine is an injection to protect you from the varicella-zoster virus  This is the same virus that causes chickenpox  Shingles is a painful rash that develops in people who had chickenpox or have been exposed to the virus  How to eat healthy:  My Plate is a model for planning healthy meals  It shows the types and amounts of foods that should go on your plate  Fruits and vegetables make up about half of your plate, and grains and protein make up the other half  A serving of dairy is included on the side of your plate  The amount of calories and serving sizes you need depends on your age, gender, weight, and height  Examples of healthy foods are listed below:  • Eat a variety of vegetables  such as dark green, red, and orange vegetables  You can also include canned vegetables low in sodium (salt) and frozen vegetables without added butter or sauces  • Eat a variety of fresh fruits , canned fruit in 100% juice, frozen fruit, and dried fruit  • Include whole grains  At least half of the grains you eat should be whole grains  Examples include whole-wheat bread, wheat pasta, brown rice, and whole-grain cereals such as oatmeal     • Eat a variety of protein foods such as seafood (fish and shellfish), lean meat, and poultry without skin (turkey and chicken)  Examples of lean meats include pork leg, shoulder, or tenderloin, and beef round, sirloin, tenderloin, and extra lean ground beef  Other protein foods include eggs and egg substitutes, beans, peas, soy products, nuts, and seeds  • Choose low-fat dairy products such as skim or 1% milk or low-fat yogurt, cheese, and cottage cheese  • Limit unhealthy fats  such as butter, hard margarine, and shortening         Exercise:  Exercise at least 30 minutes per day on most days of the week  Some examples of exercise include walking, biking, dancing, and swimming  You can also fit in more physical activity by taking the stairs instead of the elevator or parking farther away from stores  Include muscle strengthening activities 2 days each week  Regular exercise provides many health benefits  It helps you manage your weight, and decreases your risk for type 2 diabetes, heart disease, stroke, and high blood pressure  Exercise can also help improve your mood  Ask your healthcare provider about the best exercise plan for you  General health and safety guidelines:   • Do not smoke  Nicotine and other chemicals in cigarettes and cigars can cause lung damage  Ask your healthcare provider for information if you currently smoke and need help to quit  E-cigarettes or smokeless tobacco still contain nicotine  Talk to your healthcare provider before you use these products  • Limit alcohol  A drink of alcohol is 12 ounces of beer, 5 ounces of wine, or 1½ ounces of liquor  • Lose weight, if needed  Being overweight increases your risk of certain health conditions  These include heart disease, high blood pressure, type 2 diabetes, and certain types of cancer  • Protect your skin  Do not sunbathe or use tanning beds  Use sunscreen with a SPF 15 or higher  Apply sunscreen at least 15 minutes before you go outside  Reapply sunscreen every 2 hours  Wear protective clothing, hats, and sunglasses when you are outside  • Drive safely  Always wear your seatbelt  Make sure everyone in your car wears a seatbelt  A seatbelt can save your life if you are in an accident  Do not use your cell phone when you are driving  This could distract you and cause an accident  Pull over if you need to make a call or send a text message  • Practice safe sex  Use latex condoms if are sexually active and have more than one partner   Your healthcare provider may recommend screening tests for sexually transmitted infections (STIs)  • Wear helmets, lifejackets, and protective gear  Always wear a helmet when you ride a bike or motorcycle, go skiing, or play sports that could cause a head injury  Wear protective equipment when you play sports  Wear a lifejacket when you are on a boat or doing water sports  © Copyright [employer] 2022 Information is for End User's use only and may not be sold, redistributed or otherwise used for commercial purposes  The above information is an  only  It is not intended as medical advice for individual conditions or treatments  Talk to your doctor, nurse or pharmacist before following any medical regimen to see if it is safe and effective for you
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
## 2023-05-17 NOTE — ASSESSMENT & PLAN NOTE
Lab Results   Component Value Date    HGBA1C 5 9 04/13/2023     Compliant with diabetic diet, continue metformin 1000 mg bid  Has script for ophthalmology for DM eye exam, Dm foot exam completed, albumin/creatinine urine ration collected and sent to lab

## 2023-06-02 ENCOUNTER — APPOINTMENT (OUTPATIENT)
Dept: LAB | Facility: HOSPITAL | Age: 58
End: 2023-06-02
Payer: COMMERCIAL

## 2023-06-02 DIAGNOSIS — Z12.5 SCREENING FOR PROSTATE CANCER: ICD-10-CM

## 2023-06-02 DIAGNOSIS — E11.42 TYPE 2 DIABETES MELLITUS WITH DIABETIC POLYNEUROPATHY, WITHOUT LONG-TERM CURRENT USE OF INSULIN (HCC): ICD-10-CM

## 2023-06-02 DIAGNOSIS — E78.2 MIXED HYPERLIPIDEMIA: ICD-10-CM

## 2023-06-02 DIAGNOSIS — Z00.00 ANNUAL PHYSICAL EXAM: ICD-10-CM

## 2023-06-02 LAB
ALBUMIN SERPL BCP-MCNC: 4.3 G/DL (ref 3.5–5)
ALP SERPL-CCNC: 56 U/L (ref 34–104)
ALT SERPL W P-5'-P-CCNC: 23 U/L (ref 7–52)
ANION GAP SERPL CALCULATED.3IONS-SCNC: 6 MMOL/L (ref 4–13)
AST SERPL W P-5'-P-CCNC: 28 U/L (ref 13–39)
BASOPHILS # BLD AUTO: 0.05 THOUSANDS/ÂΜL (ref 0–0.1)
BASOPHILS NFR BLD AUTO: 1 % (ref 0–1)
BILIRUB SERPL-MCNC: 0.43 MG/DL (ref 0.2–1)
BUN SERPL-MCNC: 16 MG/DL (ref 5–25)
CALCIUM SERPL-MCNC: 9.1 MG/DL (ref 8.4–10.2)
CHLORIDE SERPL-SCNC: 106 MMOL/L (ref 96–108)
CHOLEST SERPL-MCNC: 79 MG/DL
CO2 SERPL-SCNC: 27 MMOL/L (ref 21–32)
CREAT SERPL-MCNC: 1.15 MG/DL (ref 0.6–1.3)
EOSINOPHIL # BLD AUTO: 0.04 THOUSAND/ÂΜL (ref 0–0.61)
EOSINOPHIL NFR BLD AUTO: 1 % (ref 0–6)
ERYTHROCYTE [DISTWIDTH] IN BLOOD BY AUTOMATED COUNT: 13.9 % (ref 11.6–15.1)
GFR SERPL CREATININE-BSD FRML MDRD: 70 ML/MIN/1.73SQ M
GLUCOSE P FAST SERPL-MCNC: 94 MG/DL (ref 65–99)
HCT VFR BLD AUTO: 39.6 % (ref 36.5–49.3)
HDLC SERPL-MCNC: 47 MG/DL
HGB BLD-MCNC: 12.9 G/DL (ref 12–17)
IMM GRANULOCYTES # BLD AUTO: 0.03 THOUSAND/UL (ref 0–0.2)
IMM GRANULOCYTES NFR BLD AUTO: 0 % (ref 0–2)
LDLC SERPL CALC-MCNC: 23 MG/DL (ref 0–100)
LYMPHOCYTES # BLD AUTO: 2.48 THOUSANDS/ÂΜL (ref 0.6–4.47)
LYMPHOCYTES NFR BLD AUTO: 31 % (ref 14–44)
MCH RBC QN AUTO: 29.3 PG (ref 26.8–34.3)
MCHC RBC AUTO-ENTMCNC: 32.6 G/DL (ref 31.4–37.4)
MCV RBC AUTO: 90 FL (ref 82–98)
MONOCYTES # BLD AUTO: 0.5 THOUSAND/ÂΜL (ref 0.17–1.22)
MONOCYTES NFR BLD AUTO: 6 % (ref 4–12)
NEUTROPHILS # BLD AUTO: 4.79 THOUSANDS/ÂΜL (ref 1.85–7.62)
NEUTS SEG NFR BLD AUTO: 61 % (ref 43–75)
NONHDLC SERPL-MCNC: 32 MG/DL
NRBC BLD AUTO-RTO: 0 /100 WBCS
PLATELET # BLD AUTO: 181 THOUSANDS/UL (ref 149–390)
PMV BLD AUTO: 11.5 FL (ref 8.9–12.7)
POTASSIUM SERPL-SCNC: 4 MMOL/L (ref 3.5–5.3)
PROT SERPL-MCNC: 6.7 G/DL (ref 6.4–8.4)
PSA SERPL-MCNC: 0.36 NG/ML (ref 0–4)
RBC # BLD AUTO: 4.41 MILLION/UL (ref 3.88–5.62)
SODIUM SERPL-SCNC: 139 MMOL/L (ref 135–147)
TRIGL SERPL-MCNC: 44 MG/DL
WBC # BLD AUTO: 7.89 THOUSAND/UL (ref 4.31–10.16)

## 2023-06-02 PROCEDURE — 80061 LIPID PANEL: CPT

## 2023-06-02 PROCEDURE — 36415 COLL VENOUS BLD VENIPUNCTURE: CPT

## 2023-06-02 PROCEDURE — 85025 COMPLETE CBC W/AUTO DIFF WBC: CPT

## 2023-06-02 PROCEDURE — G0103 PSA SCREENING: HCPCS

## 2023-06-02 PROCEDURE — 80053 COMPREHEN METABOLIC PANEL: CPT

## 2023-06-06 ENCOUNTER — VBI (OUTPATIENT)
Dept: ADMINISTRATIVE | Facility: OTHER | Age: 58
End: 2023-06-06

## 2023-07-05 ENCOUNTER — OFFICE VISIT (OUTPATIENT)
Dept: CARDIOLOGY CLINIC | Facility: CLINIC | Age: 58
End: 2023-07-05
Payer: COMMERCIAL

## 2023-07-05 VITALS
OXYGEN SATURATION: 97 % | HEART RATE: 70 BPM | SYSTOLIC BLOOD PRESSURE: 128 MMHG | HEIGHT: 72 IN | WEIGHT: 218 LBS | DIASTOLIC BLOOD PRESSURE: 74 MMHG | BODY MASS INDEX: 29.53 KG/M2

## 2023-07-05 DIAGNOSIS — E11.42 TYPE 2 DIABETES MELLITUS WITH DIABETIC POLYNEUROPATHY, WITHOUT LONG-TERM CURRENT USE OF INSULIN (HCC): ICD-10-CM

## 2023-07-05 DIAGNOSIS — I25.10 CORONARY ARTERY DISEASE INVOLVING NATIVE CORONARY ARTERY OF NATIVE HEART WITHOUT ANGINA PECTORIS: Primary | ICD-10-CM

## 2023-07-05 DIAGNOSIS — E78.2 MIXED HYPERLIPIDEMIA: ICD-10-CM

## 2023-07-05 PROCEDURE — 99213 OFFICE O/P EST LOW 20 MIN: CPT | Performed by: INTERNAL MEDICINE

## 2023-07-05 RX ORDER — ATORVASTATIN CALCIUM 20 MG/1
20 TABLET, FILM COATED ORAL DAILY
Qty: 90 TABLET | Refills: 3 | Status: SHIPPED | OUTPATIENT
Start: 2023-07-05

## 2023-07-05 RX ORDER — TERBINAFINE HYDROCHLORIDE 250 MG/1
1 TABLET ORAL DAILY
COMMUNITY

## 2023-07-05 RX ORDER — FLUOXETINE HYDROCHLORIDE 20 MG/1
CAPSULE ORAL
COMMUNITY

## 2023-07-05 RX ORDER — GABAPENTIN 600 MG/1
TABLET ORAL
COMMUNITY
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
## 2023-07-05 NOTE — PATIENT INSTRUCTIONS
You were seen today in the Cardiology office for follow up. Please decrease your atorvastatin to 20 mg. A new prescription was sent to your pharmacy. Please have a repeat lipid panel in 6 months prior to your follow up appointment. Thank you for choosing [facility].

## 2023-07-09 PROBLEM — Z87.891 FORMER SMOKER: Status: ACTIVE | Noted: 2023-07-09

## 2023-07-12 DIAGNOSIS — E11.42 TYPE 2 DIABETES MELLITUS WITH DIABETIC POLYNEUROPATHY, WITHOUT LONG-TERM CURRENT USE OF INSULIN (HCC): ICD-10-CM

## 2023-07-12 NOTE — TELEPHONE ENCOUNTER
metFORMIN (GLUCOPHAGE) 1000 MG tablet Take 1 tablet (1,000 mg total) by mouth in the morning and 1 tablet (1,000 mg total) before bedtime.      [facility] [address]     Doesn't have any left

## 2023-11-20 ENCOUNTER — TELEPHONE (OUTPATIENT)
Age: 58
End: 2023-11-20

## 2023-11-21 ENCOUNTER — OFFICE VISIT (OUTPATIENT)
Dept: FAMILY MEDICINE CLINIC | Facility: CLINIC | Age: 58
End: 2023-11-21
Payer: COMMERCIAL

## 2023-11-21 VITALS
HEART RATE: 75 BPM | HEIGHT: 72 IN | DIASTOLIC BLOOD PRESSURE: 76 MMHG | WEIGHT: 205.2 LBS | TEMPERATURE: 98 F | BODY MASS INDEX: 27.79 KG/M2 | SYSTOLIC BLOOD PRESSURE: 128 MMHG | OXYGEN SATURATION: 98 %

## 2023-11-21 DIAGNOSIS — E78.2 MIXED HYPERLIPIDEMIA: ICD-10-CM

## 2023-11-21 DIAGNOSIS — E11.42 TYPE 2 DIABETES MELLITUS WITH DIABETIC POLYNEUROPATHY, WITHOUT LONG-TERM CURRENT USE OF INSULIN (HCC): Primary | ICD-10-CM

## 2023-11-21 LAB
LEFT EYE DIABETIC RETINOPATHY: ABNORMAL
LEFT EYE IMAGE QUALITY: ABNORMAL
LEFT EYE MACULAR EDEMA: ABNORMAL
LEFT EYE OTHER RETINOPATHY: ABNORMAL
RIGHT EYE DIABETIC RETINOPATHY: ABNORMAL
RIGHT EYE IMAGE QUALITY: ABNORMAL
RIGHT EYE MACULAR EDEMA: ABNORMAL
RIGHT EYE OTHER RETINOPATHY: ABNORMAL
SEVERITY (EYE EXAM): ABNORMAL

## 2023-11-21 PROCEDURE — 99214 OFFICE O/P EST MOD 30 MIN: CPT

## 2023-11-21 NOTE — PROGRESS NOTES
Name: Zaina Coker      : 1965      MRN: [id number]  Encounter Provider: ERIC Gayle  Encounter Date: 2023   Encounter department: [facility] MEDICINE    Assessment & Plan     1. Type 2 diabetes mellitus with diabetic polyneuropathy, without long-term current use of insulin (HCC)  Assessment & Plan:    Lab Results   Component Value Date    HGBA1C 5.9 2023     Script provided to check A1C, pt is compliant with metformin 1000 mg bid, continue diabetic diet. DM eye exam done. Orders:  -     Hemoglobin A1C; Future; Expected date: 2023  -     IRIS Diabetic eye exam    2. Mixed hyperlipidemia  Assessment & Plan:  Pt encouraged to complete lipid panel, foll with cardiology atorvastatin was decreased to 20 mg qd. Subjective      Pt presents for interval  visit DM 2 - Pt has been taking metformin 1000 mg bid sates has recently not been able to continue with diabetic diet due to financial strains however exercises daily. HLD - Pt no longer taking atorvastatin 40 last saw cardiology 2023 and dose was decreased to 20 mg qd, pt regularly exercises and foll low cholesterol diet. Encouraged to complete lipid panel ordered. Review of Systems   Constitutional:  Negative for appetite change, diaphoresis, fatigue and fever. HENT:  Negative for trouble swallowing. Respiratory:  Negative for shortness of breath. Cardiovascular:  Negative for chest pain and leg swelling. Gastrointestinal:  Negative for abdominal pain, constipation and vomiting. Endocrine: Negative for polydipsia, polyphagia and polyuria. Genitourinary:  Negative for decreased urine volume and frequency. Musculoskeletal:  Negative for joint swelling. Skin:  Negative for color change, rash and wound. Neurological:  Negative for dizziness, light-headedness and headaches. Psychiatric/Behavioral:  Negative for agitation.         Current Outpatient Medications on File Prior to Visit   Medication Sig    aspirin 81 mg chewable tablet Chew 1 tablet (81 mg total) daily    atorvastatin (LIPITOR) 20 mg tablet Take 1 tablet (20 mg total) by mouth daily    metFORMIN (GLUCOPHAGE) 1000 MG tablet Take 1 tablet (1,000 mg total) by mouth 2 (two) times a day    nitroglycerin (NITROSTAT) 0.4 mg SL tablet Place 1 tablet (0.4 mg total) under the tongue every 5 (five) minutes as needed for chest pain    [DISCONTINUED] FLUoxetine (PROzac) 20 mg capsule  (Patient not taking: Reported on 7/5/2023)    [DISCONTINUED] gabapentin (NEURONTIN) 600 MG tablet  (Patient not taking: Reported on 7/5/2023)    [DISCONTINUED] terbinafine (LamISIL) 250 mg tablet Take 1 tablet by mouth daily (Patient not taking: Reported on 7/5/2023)       Objective     /76 (BP Location: Right arm, Patient Position: Sitting, Cuff Size: Adult)   Pulse 75   Temp 98 °F (36.7 °C) (Tympanic)   Ht 6' (1.829 m)   Wt 93.1 kg (205 lb 3.2 oz)   SpO2 98%   BMI 27.83 kg/m²     Physical Exam  Vitals and nursing note reviewed. Constitutional:       General: He is not in acute distress. Appearance: Normal appearance. He is normal weight. He is not ill-appearing or toxic-appearing. HENT:      Head: Normocephalic and atraumatic. Right Ear: Tympanic membrane, ear canal and external ear normal. There is no impacted cerumen. Left Ear: Tympanic membrane, ear canal and external ear normal. There is no impacted cerumen. Nose: Nose normal. No congestion or rhinorrhea. Mouth/Throat:      Mouth: Mucous membranes are moist.      Pharynx: No oropharyngeal exudate or posterior oropharyngeal erythema. Eyes:      General: No scleral icterus. Right eye: No discharge. Left eye: No discharge. Extraocular Movements: Extraocular movements intact. Conjunctiva/sclera: Conjunctivae normal.      Pupils: Pupils are equal, round, and reactive to light. Neck:      Vascular: No carotid bruit. Cardiovascular:      Rate and Rhythm: Normal rate and regular rhythm. Pulses: Normal pulses. Heart sounds: Normal heart sounds. No murmur heard. Pulmonary:      Effort: Pulmonary effort is normal. No respiratory distress. Breath sounds: Normal breath sounds. No stridor. No wheezing or rhonchi. Chest:      Chest wall: No tenderness. Abdominal:      General: Bowel sounds are normal. There is no distension. Palpations: Abdomen is soft. There is no mass. Tenderness: There is no abdominal tenderness. There is no right CVA tenderness, left CVA tenderness, guarding or rebound. Hernia: No hernia is present. Musculoskeletal:         General: No swelling or tenderness. Normal range of motion. Cervical back: Normal range of motion. No rigidity or tenderness. Right lower leg: No edema. Left lower leg: No edema. Lymphadenopathy:      Cervical: No cervical adenopathy. Skin:     General: Skin is warm. Capillary Refill: Capillary refill takes less than 2 seconds. Coloration: Skin is not jaundiced. Findings: No bruising, erythema or rash. Neurological:      General: No focal deficit present. Mental Status: He is alert and oriented to person, place, and time. Cranial Nerves: No cranial nerve deficit. Sensory: No sensory deficit. Motor: No weakness. Coordination: Coordination normal.      Gait: Gait normal.   Psychiatric:         Mood and Affect: Mood normal.         Behavior: Behavior normal.         Thought Content:  Thought content normal.       [address]

## 2023-11-21 NOTE — ASSESSMENT & PLAN NOTE
Lab Results   Component Value Date    HGBA1C 5.9 04/13/2023     Script provided to check A1C, pt is compliant with metformin 1000 mg bid, continue diabetic diet. DM eye exam done.

## 2023-11-30 NOTE — ASSESSMENT & PLAN NOTE
Pt encouraged to complete lipid panel, foll with cardiology atorvastatin was decreased to 20 mg qd. 30-Nov-2023

## 2024-01-19 ENCOUNTER — TELEPHONE (OUTPATIENT)
Dept: CARDIOLOGY CLINIC | Facility: CLINIC | Age: 59
End: 2024-01-19

## 2024-01-19 DIAGNOSIS — I21.02 ACUTE ST ELEVATION MYOCARDIAL INFARCTION (STEMI) INVOLVING LEFT ANTERIOR DESCENDING (LAD) CORONARY ARTERY (HCC): ICD-10-CM

## 2024-01-19 RX ORDER — NITROGLYCERIN 0.4 MG/1
0.4 TABLET SUBLINGUAL
Qty: 25 TABLET | Refills: 0 | Status: SHIPPED | OUTPATIENT
Start: 2024-01-19

## 2024-01-19 NOTE — TELEPHONE ENCOUNTER
Pt called stating he is under a lot of stress and experiencing cheat pain and asking for a new bottle of Nitro.   The chest pain subsided by the time I returned his call.   I sent a new script to the pharmacy and suggested making an appt or reporting to the ED if CP returns.   He verbalized understanding

## 2024-01-23 ENCOUNTER — HOSPITAL ENCOUNTER (EMERGENCY)
Facility: HOSPITAL | Age: 59
End: 2024-01-24
Attending: EMERGENCY MEDICINE
Payer: COMMERCIAL

## 2024-01-23 VITALS
OXYGEN SATURATION: 98 % | SYSTOLIC BLOOD PRESSURE: 151 MMHG | HEART RATE: 74 BPM | TEMPERATURE: 98 F | DIASTOLIC BLOOD PRESSURE: 80 MMHG | RESPIRATION RATE: 18 BRPM

## 2024-01-23 DIAGNOSIS — F22 DELUSION (HCC): ICD-10-CM

## 2024-01-23 DIAGNOSIS — F22 PARANOIA (HCC): Primary | ICD-10-CM

## 2024-01-23 LAB
ALBUMIN SERPL BCP-MCNC: 5 G/DL (ref 3.5–5)
ALP SERPL-CCNC: 64 U/L (ref 34–104)
ALT SERPL W P-5'-P-CCNC: 29 U/L (ref 7–52)
AMPHETAMINES SERPL QL SCN: NEGATIVE
ANION GAP SERPL CALCULATED.3IONS-SCNC: 11 MMOL/L
AST SERPL W P-5'-P-CCNC: 39 U/L (ref 13–39)
BACTERIA UR QL AUTO: ABNORMAL /HPF
BARBITURATES UR QL: NEGATIVE
BASOPHILS # BLD MANUAL: 0 THOUSAND/UL (ref 0–0.1)
BASOPHILS NFR MAR MANUAL: 0 % (ref 0–1)
BENZODIAZ UR QL: NEGATIVE
BILIRUB SERPL-MCNC: 0.64 MG/DL (ref 0.2–1)
BILIRUB UR QL STRIP: ABNORMAL
BUN SERPL-MCNC: 23 MG/DL (ref 5–25)
CALCIUM SERPL-MCNC: 10 MG/DL (ref 8.4–10.2)
CHLORIDE SERPL-SCNC: 104 MMOL/L (ref 96–108)
CLARITY UR: CLEAR
CO2 SERPL-SCNC: 26 MMOL/L (ref 21–32)
COCAINE UR QL: NEGATIVE
COLOR UR: YELLOW
CREAT SERPL-MCNC: 1.39 MG/DL (ref 0.6–1.3)
EOSINOPHIL # BLD MANUAL: 0 THOUSAND/UL (ref 0–0.4)
EOSINOPHIL NFR BLD MANUAL: 0 % (ref 0–6)
ERYTHROCYTE [DISTWIDTH] IN BLOOD BY AUTOMATED COUNT: 13.4 % (ref 11.6–15.1)
ETHANOL EXG-MCNC: NORMAL MG/DL
ETHANOL SERPL-MCNC: <10 MG/DL
GFR SERPL CREATININE-BSD FRML MDRD: 55 ML/MIN/1.73SQ M
GLUCOSE SERPL-MCNC: 118 MG/DL (ref 65–140)
GLUCOSE UR STRIP-MCNC: NEGATIVE MG/DL
HCT VFR BLD AUTO: 44.8 % (ref 36.5–49.3)
HGB BLD-MCNC: 14.8 G/DL (ref 12–17)
HGB UR QL STRIP.AUTO: NEGATIVE
HYALINE CASTS #/AREA URNS LPF: ABNORMAL /LPF
KETONES UR STRIP-MCNC: NEGATIVE MG/DL
LEUKOCYTE ESTERASE UR QL STRIP: NEGATIVE
LYMPHOCYTES # BLD AUTO: 2.34 THOUSAND/UL (ref 0.6–4.47)
LYMPHOCYTES # BLD AUTO: 22 % (ref 14–44)
MCH RBC QN AUTO: 29.5 PG (ref 26.8–34.3)
MCHC RBC AUTO-ENTMCNC: 33 G/DL (ref 31.4–37.4)
MCV RBC AUTO: 89 FL (ref 82–98)
METHADONE UR QL: NEGATIVE
MONOCYTES # BLD AUTO: 0.37 THOUSAND/UL (ref 0–1.22)
MONOCYTES NFR BLD: 4 % (ref 4–12)
MYELOCYTES NFR BLD MANUAL: 2 % (ref 0–1)
NEUTROPHILS # BLD MANUAL: 6.44 THOUSAND/UL (ref 1.85–7.62)
NEUTS SEG NFR BLD AUTO: 69 % (ref 43–75)
NITRITE UR QL STRIP: NEGATIVE
NON-SQ EPI CELLS URNS QL MICRO: ABNORMAL /HPF
OPIATES UR QL SCN: NEGATIVE
OXYCODONE+OXYMORPHONE UR QL SCN: NEGATIVE
PCP UR QL: NEGATIVE
PH UR STRIP.AUTO: 6 [PH]
PLATELET # BLD AUTO: 199 THOUSANDS/UL (ref 149–390)
PLATELET BLD QL SMEAR: ADEQUATE
PMV BLD AUTO: 11.2 FL (ref 8.9–12.7)
POTASSIUM SERPL-SCNC: 4.6 MMOL/L (ref 3.5–5.3)
PROT SERPL-MCNC: 8 G/DL (ref 6.4–8.4)
PROT UR STRIP-MCNC: ABNORMAL MG/DL
RBC # BLD AUTO: 5.02 MILLION/UL (ref 3.88–5.62)
RBC #/AREA URNS AUTO: ABNORMAL /HPF
RBC MORPH BLD: NORMAL
SODIUM SERPL-SCNC: 141 MMOL/L (ref 135–147)
SP GR UR STRIP.AUTO: >=1.03 (ref 1–1.03)
THC UR QL: POSITIVE
TSH SERPL DL<=0.05 MIU/L-ACNC: 3.8 UIU/ML (ref 0.45–4.5)
UROBILINOGEN UR QL STRIP.AUTO: 0.2 E.U./DL
VARIANT LYMPHS # BLD AUTO: 3 %
WBC # BLD AUTO: 9.34 THOUSAND/UL (ref 4.31–10.16)
WBC #/AREA URNS AUTO: ABNORMAL /HPF

## 2024-01-23 PROCEDURE — 85007 BL SMEAR W/DIFF WBC COUNT: CPT | Performed by: EMERGENCY MEDICINE

## 2024-01-23 PROCEDURE — 99285 EMERGENCY DEPT VISIT HI MDM: CPT | Performed by: EMERGENCY MEDICINE

## 2024-01-23 PROCEDURE — 85027 COMPLETE CBC AUTOMATED: CPT | Performed by: EMERGENCY MEDICINE

## 2024-01-23 PROCEDURE — 81003 URINALYSIS AUTO W/O SCOPE: CPT | Performed by: EMERGENCY MEDICINE

## 2024-01-23 PROCEDURE — 80307 DRUG TEST PRSMV CHEM ANLYZR: CPT | Performed by: EMERGENCY MEDICINE

## 2024-01-23 PROCEDURE — 81001 URINALYSIS AUTO W/SCOPE: CPT | Performed by: EMERGENCY MEDICINE

## 2024-01-23 PROCEDURE — 99285 EMERGENCY DEPT VISIT HI MDM: CPT

## 2024-01-23 PROCEDURE — 80053 COMPREHEN METABOLIC PANEL: CPT | Performed by: EMERGENCY MEDICINE

## 2024-01-23 PROCEDURE — 82075 ASSAY OF BREATH ETHANOL: CPT | Performed by: EMERGENCY MEDICINE

## 2024-01-23 PROCEDURE — 82077 ASSAY SPEC XCP UR&BREATH IA: CPT | Performed by: EMERGENCY MEDICINE

## 2024-01-23 PROCEDURE — 84443 ASSAY THYROID STIM HORMONE: CPT | Performed by: EMERGENCY MEDICINE

## 2024-01-23 PROCEDURE — 36415 COLL VENOUS BLD VENIPUNCTURE: CPT | Performed by: EMERGENCY MEDICINE

## 2024-01-23 RX ORDER — ATORVASTATIN CALCIUM 10 MG/1
10 TABLET, FILM COATED ORAL
Status: DISCONTINUED | OUTPATIENT
Start: 2024-01-23 | End: 2024-01-23

## 2024-01-23 RX ORDER — ATORVASTATIN CALCIUM 20 MG/1
20 TABLET, FILM COATED ORAL
Status: DISCONTINUED | OUTPATIENT
Start: 2024-01-23 | End: 2024-01-24 | Stop reason: HOSPADM

## 2024-01-23 NOTE — ED NOTES
Patient currently refusing finger food tray and medications at this time.     Shirin Zhu, RN  01/23/24 1457
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
## 2024-01-23 NOTE — Clinical Note
Jah Siegel was seen and treated in our emergency department on 1/23/2024.                Diagnosis: Private    Jah  .    He may return on this date:     Patient was evaluated in the emergency department.  Patient is being psychiatrically committed either involuntarily or voluntarily.     If you have any questions or concerns, please don't hesitate to call.      Samy Mcconnell, DO    ______________________________           _______________          _______________  Hospital Representative                              Date                                Time

## 2024-01-24 NOTE — ED NOTES
Chart reviewed. Transport was completed in [facility] by ED Charge Rn - [facility]@0930.    [facility] updated of ETA, insurance details. S/w Kartik.  No RN report is necessary prior to transfer unless clinical changes or Medication is given.       [facility] updated [facility] of disposition and transfer to [facility] today.  Spoke with Zonia Marin, she will notify [facility] of the same.

## 2024-01-24 NOTE — ED NOTES
Bed search initiated to the following facilities:    [place]: spoke with Joana, beds available. Faxed chart for review.  [facility]: per admissions, no beds available.  [facility]: Spoke with Андрей, beds available for 1/24. Faxed chart for review.  [facility]: spoke with Henrietta, beds available. Faxed chart for review.  [facility]: spoke w/ Ana Maria, beds available. Faxed chart for review.    - [facility] also has 302 for review.

## 2024-01-24 NOTE — EMTALA/ACUTE CARE TRANSFER
[facility] EMERGENCY DEPARTMENT  [address] [place] [id number]  Dept: [phone]      EMTALA TRANSFER CONSENT    NAME Jah GARZA 1965                              MRN [id number]    I have been informed of my rights regarding examination, treatment, and transfer   by Dr. Dipesh Garcia MD    Benefits: Specialized equipment and/or services available at the receiving facility (Include comment)________________________    Risks: Potential for delay in receiving treatment, Potential deterioration of medical condition, Increased discomfort during transfer, Possible worsening of condition or death during transfer      Consent for Transfer:  I acknowledge that my medical condition has been evaluated and explained to me by the emergency department physician or other qualified medical person and/or my attending physician, who has recommended that I be transferred to the service of  Accepting Physician: Dr. Theresa Cabrales at Accepting Facility Name, City & State : [place], [place]. The above potential benefits of such transfer, the potential risks associated with such transfer, and the probable risks of not being transferred have been explained to me, and I fully understand them.  The doctor has explained that, in my case, the benefits of transfer outweigh the risks.  I agree to be transferred.    I authorize the performance of emergency medical procedures and treatments upon me in both transit and upon arrival at the receiving facility.  Additionally, I authorize the release of any and all medical records to the receiving facility and request they be transported with me, if possible.  I understand that the safest mode of transportation during a medical emergency is an ambulance and that the Hospital advocates the use of this mode of transport. Risks of traveling to the receiving facility by car, including absence of medical control, life  sustaining equipment, such as oxygen, and medical personnel has been explained to me and I fully understand them.    (JIMMY CORRECT BOX BELOW)  [  ]  I consent to the stated transfer and to be transported by ambulance/helicopter.  [  ]  I consent to the stated transfer, but refuse transportation by ambulance and accept full responsibility for my transportation by car.  I understand the risks of non-ambulance transfers and I exonerate the Hospital and its staff from any deterioration in my condition that results from this refusal.    X___________________________________________    DATE  24  TIME________  Signature of patient or legally responsible individual signing on patient behalf           RELATIONSHIP TO PATIENT_________________________          Provider Certification    NAME Jah Siegel                                         1965                              MRN [id number]    A medical screening exam was performed on the above named patient.  Based on the examination:    Condition Necessitating Transfer The primary encounter diagnosis was Paranoia (HCC). A diagnosis of Delusion (HCC) was also pertinent to this visit.    Patient Condition: The patient has been stabilized such that within reasonable medical probability, no material deterioration of the patient condition or the condition of the unborn child(dmitriy) is likely to result from the transfer    Reason for Transfer: Level of Care needed not available at this facility    Transfer Requirements: Facility [place], [place]   Space available and qualified personnel available for treatment as acknowledged by    Agreed to accept transfer and to provide appropriate medical treatment as acknowledged by       Dr. Theresa Cabrales  Appropriate medical records of the examination and treatment of the patient are provided at the time of transfer   STAFF INITIAL WHEN COMPLETED _______  Transfer will be performed by qualified personnel from    and  appropriate transfer equipment as required, including the use of necessary and appropriate life support measures.    Provider Certification: I have examined the patient and explained the following risks and benefits of being transferred/refusing transfer to the patient/family:  General risk, such as traffic hazards, adverse weather conditions, rough terrain or turbulence, possible failure of equipment (including vehicle or aircraft), or consequences of actions of persons outside the control of the transport personnel, Unanticipated needs of medical equipment and personnel during transport, Risk of worsening condition, The possibility of a transport vehicle being unavailable, The patient is stable for psychiatric transfer because they are medically stable, and is protected from harming him/herself or others during transport      Based on these reasonable risks and benefits to the patient and/or the unborn child(dmitriy), and based upon the information available at the time of the patient’s examination, I certify that the medical benefits reasonably to be expected from the provision of appropriate medical treatments at another medical facility outweigh the increasing risks, if any, to the individual’s medical condition, and in the case of labor to the unborn child, from effecting the transfer.    X____________________________________________ DATE 01/24/24        TIME_______      ORIGINAL - SEND TO MEDICAL RECORDS   COPY - SEND WITH PATIENT DURING TRANSFER

## 2024-01-24 NOTE — ED NOTES
"Pt Awake. Speech is pressured and pt is facing the wall talking to himself. Pt requests to see a cardiologist stating \" My hand is swollen and I have 2 stents in my heart. I want to make sure my heart is ok.\" Inspection of the hand shows localized swelling between thumb and index finger. Full movement of hand and fingers. Minor limitations of movement to the left thumb. Provider notified. Pt agreeable to seeing provider for the hand prior to laying back down to rest.      Mayra Victoria RN  01/24/24 4763    "

## 2024-01-24 NOTE — ED NOTES
's Office to be notified of patient's discharge from ED or BHU.      [facility], Zonia Marin, to be notified when patient is placed.

## 2024-01-24 NOTE — ED NOTES
Pt ambulated to rest room. Completed oral care independently.      Mayra Victoria RN  01/24/24 3889

## 2024-01-24 NOTE — ED NOTES
Charge nurse advised that [facility] no longer transport at 930.  [facility] contacted [facility]. S/w Robert.  Transport changed to [facility]@0900.

## 2024-01-24 NOTE — ED NOTES
Patient is accepted at [facility]  Patient is accepted by Dr. Theresa Ying.    Transportation is arranged with [facility].     Transportation is scheduled for: pending  Patient may go to the floor at anytime.

## 2024-01-24 NOTE — ED NOTES
[facility] checked at this time and up and requested transport to [facility] put in at this time      Giovanni Millard RN  01/24/24 0755

## 2024-01-24 NOTE — ED NOTES
Insurance Authorization for admission:   Phone call placed to Patricio  Phone number: [phone]     Spoke to Nohemi Tovar    5 days approved.  Level of care: 302, IP Psych  Review on **. Pending placement  Authorization # **.    Facility to call      Found on PROMISe:  [facility]  ID# [id number]

## 2024-01-24 NOTE — ED NOTES
"Pt alone in room with sitter positioned in doorway. Pt facing wall away from sitter speaking with pressured speech in a continuous conversation. Difficult to determine if pt. Is \"Soapboxing\" or speaking to a hallucinated audience. Content of speech is political, tangential, and flight of ideas surrounding paranoia of government control of the electricity supply and personal persecution by the government. Pt redirectable when provider approached for examination of swollen hand but continued on to rant.       Mayra Victoria RN  01/24/24 0327    "

## 2024-02-18 NOTE — PROGRESS NOTES
Cardiology   Acute  Office Visit Note  Jah Siegel   58 y.o.   male   MRN: [id number]  [facility]  [address] [address]  [address]  [place] [address] [phone]    PCP: ERIC Martin  Cardiologist:   Dr. KASH Chanel                Summary of recommendations  Heart healthy diet. Educational information provided  Follow up will be scheduled with Dr Chanel        Assessment/plan  CAD; S/P AWMI, S/P PCI with 2 overlapping HALI prLAD 1/2020  on aspirin, high-intensity statin, beta-blocker  EKG today:NSR 70 bpm.  PACs, in a begiminy fashion  /72  Hyperlipidemia, on atorvastatin 40 mg daily.  Previously after his stent he was off for a period of time on his own accord, and then restarted  Lipid panel 6/2/23: calc LDL 23, non HDL 32, at goal   Latest Reference Range & Units 01/07/20 04:33 06/02/23 09:07   Cholesterol See Comment mg/dL 125 79   Triglycerides See Comment mg/dL 39 44   HDL >=40 mg/dL 56 47   Non-HDL Cholesterol mg/dl  32   LDL Calculated 0 - 100 mg/dL 61 23   Type 2 diabetes mellitus.  4/13/23: HgA1c 5.9, at goal. On insulin and metformin   Latest Reference Range & Units 03/11/21 10:24 06/16/21 14:12 05/12/22 09:25 04/13/23 09:42   Hemoglobin A1C 6.5  5.4 5.9 5.9 5.9     Marijuana use daily. Used since 12 yo  Tobacco abuse.  Quit 4/2021 after a 10-pack-year history.  Cardiac testing  TE 1/6/2020.normal LV size and function, possible mild anteroseptal hypokinesis, LVH, normal RV size and function, PASP 35-40 mm Hg.   cardiac catheterization 1/6/20  Left main: Normal.  LAD: The vessel was normal sized. There was a 90% proximal stenosis. This was the culprit for the patient's anterior STEMI. The diagonal branches were free of obstructive disease.  Circumflex: The vessel was normal sized and gave rise to two OM brancvhes. There were no significant lesions.  RCA: The vessel was normal sized and dominant, giving rise to the PDA and a posterolateral  branch. No atherosclerosis was seen  -->Single vessel disease, with a 90% proximal LAD stenosis representing the culprit for the patient's anterior STEMI.  Successful IVUS-guided PCI, proximal LAD, with placement of two overlapping drug-eluting stents.  Mildly-moderately elevated LVEDP  TTE 10/27/21. EF 65%.  Borderline LVH.  Diastolic function normal for age.  Mild LAE.  Trace AI.  Trace MR.  Mild-to-moderate TR.  PA P 20 5 mm Hg                     HPI  Jah Siegel is a 55 yo male with diabetes, tobacco abuse and marijuana use.  He presented to [facility] as a pre-hospital MI alert on 1/6/20.  That morning he went to the gym and upon returning home he developed 10/10 midsternal substernal chest pain and abdominal pain with radiation to his both his arms, back and abdomen with associated dyspnea.  His EKG on arrival showed anterior ST elevation with reciprocal ST changes in the inferior leads.  He was taken emergently for coronary angiography.  This demonstrated a 90% proximal LAD stenosis.  He underwent PCI and deployment of 2 overlapping drug-eluting stents with a good result.  An echocardiogram demonstrated preserved LV function.  He was placed on dual antiplatelet therapy with aspirin and Brilinta. He was placed on a high-intensity statin.  He was advised complete tobacco cessation.  Reports he only smokes cigars every other day to help increase the affects of marijuana that he smokes regularly.  He is agreeable to stopping smoking cigars but desires to continue marijuana use.  His echocardiogram showed preserved LV function possible mild anterior septal hypokinesis and left ventricular hypertrophy.  RV size was normal.    8/30/21  General cardiology follow-up.    This is his 1st office visit since January 2020 when he presented with an anterior MI and received overlapping2 drug-eluting stents to his  Proximal LAD.  He has transportation issues.  He has no car.  He tries to protect himself  "given the COVID-19 pandemic.  He does not like to use public transportation with the bus, etc. .  Reports where he lives most people are not masking.  He did receive the  PfizerCOVID-19 vaccine himself, May 19 and Yulissa 10.  Today, he received a ride from [employer].  Unfortunately, they were late.  He need to call alternative transportation.  Our visit was abbreviated, given this.  He is on disability, since around 2007.  He works out regularly, at home.  He does weights, and has a lot of lifting equipment.  He does ride the stationary bike, \"for fun\".  Sometimes this can be for 10 minutes or 30 minutes or 2 hours.  With his usual activity, he denies chest pain, shortness of breath, palpitations lightheadedness or dizziness. He currently has a broken toe on his left foot    He continues to smoke marijuana, daily.  He tells me he has chronic lower extremity neuropathy from his diabetes and this helps.  He smokes 1 cigar a day.  He denies any cigarette use.    He is compliant with his medications.  He has had refills, per his PCP.    For now, he will remain on Brilinta 60 mg b.i.d. along with his aspirin.    Will get an updated echocardiogram.  He will return in a short interval, to meet an office cardiologist for the 1st time.  He has a full set of labs requested, by his PCP which he anticipates getting very soon.  His EKG today in the office showed sinus rhythm 68 beats per minute with normal intervals. Blood pressure by me 132/90 in the left arm. He tells me it was elevated because he was angry about his transportation issues.  Will continue to monitor.  He does not have equipment at home    Interval history  Never followed up in the office  Has not seen a cardiologist in the office    8/22/22  Overdue follow-up  He saw his PCP last, 5/12/22.  Full labs have been ordered  He is no longer taking atorvastatin.  He is not taking the metoprolol.  He said that we stopped them.  I re-educated him, that he needs to follow up in " the office regularly in order to get medications refilled, and that we did not stop them  Current medications include ticagrelor 60 mg q.12 and metformin.  He was given a short prescription of ticagrelor until re-evaluated in the office  He is not taking the metoprolol, the atorvastatin nor aspirin  He is on disability from [employer] related to his diabetes he tells me.  He does not work.  He has neuropathy and issues with his eyes  He quit smoking tobacco 13 months ago.  He was commended.  He tells me he was smoking unfiltered cigarettes with a very high nicotine level  He continues to smoke marijuana on a daily basis and has since he was 13 years old.  He was educated that this could cause a cardiomyopathy.  He exercises regularly.  He says every day he does a 1000 pushups and 100 pull-ups. He also has a treadmill but does not use that often.    He denies chest pain, shortness of breath. lightheadedness or dizziness.  I recommended regular follow-up in adherence to medical therapy  He tells me he will get his labs in the near future.  He will follow with a cardiologist at the [facility]    Interval history  11/30/22; 7/5/23 OV Dr Chanel  At the last OV, per his note:   Coronary artery disease involving native coronary artery of native heart without angina pectoris: Anterior STEMI - 01/06/2020 pre-hospital MI alert. 90% proximal LAD stenosis.  He underwent PCI/HALI x 2 overlapping. Good expansion and apposition by IVUS. Remaining vessels without obstructive disease. He completed >12 months DAPT with aspirin and ticagrelor. Subsequently discontinued aspirin and continued ticagrelor. Former smoker.  -Continue aspirin 81 mg daily  -Self discontinued beta blocker earlier than 3 years post MI. Currently without anginal complaints.      Type 2 diabetes mellitus with diabetic polyneuropathy, without long-term current use of insulin (HCC): Last hemoglobin A1c 5.9%. continue metformin.     Mixed hyperlipidemia: Very  good lipid control and physical activity at goal. Lipid panel with TC 79, TGs 44, HDL 47, LDL 23. Wants to reduce amount of medications but advised that he should remain on statin therapy. We can attempt atorvastatin 20 mg and repeat lipid panel prior to follow up.  -     atorvastatin (LIPITOR) 20 mg tablet; Take 1 tablet (20 mg total) by mouth daily  -     Lipid Panel with Direct LDL reflex; Future         1/23/24 ED visit. Psychiatric issue  Aggression,agitation and homicidal ideas   -->Acc by Police  Being evicted from his home  UA: + THC  DCd to [facility] unit after medically cleared, on a 302  Dx: paranoia and delusions      2/19/24  Acute OV  PMH: CAD;history anterior STEMI s/p HALI x 2 LAD (1/2020), HTN, HL,DM2, recent admission for behavioral health issue as above. Daily THC use    He was concerned about his stents given that he was tased January 23, above, and the police incident.reports he was manhandled by 6 police officers      Arrived by Justin    138/72  EKG NSR 70. PACS, in a begiminy fashion  ROS: Intermittent numbness of the left arm, some posterior neck pain.  He denies chest pain shortness of breath lightheadedness or dizziness.  Since being discharged from [facility] February 2, he has been exercising daily per his usual.  He reports completing over 500 push-ups a day, and 120 pull-ups a day.  With his activity, again he denies any exertional symptoms.  Reports he has had some neuropathy over time, of the upper and lower extremities.    He participates in regular activity without any symptoms.  His blood pressure is satisfactory.  His EKG is within normal limits.  With respect to his concern of her stents, I reassured him.  He is adherent to his medications, as directed.  I recommend close follow-up, in 4 months with his usual cardiologist      Assessment  Diagnoses and all orders for this visit:    Coronary artery disease involving native coronary artery of native heart without angina  pectoris  -     POCT ECG    Type 2 diabetes mellitus with diabetic polyneuropathy, without long-term current use of insulin (HCC)    Former smoker    Mixed hyperlipidemia    Marijuana use            Past Medical History:   Diagnosis Date    Acute ST segment elevation myocardial infarction (HCC)     Hyperlipidemia     Myocardial infarction (HCC)     Neuropathy     Nocturia     Type II diabetes mellitus (HCC)     Vitamin D deficiency        Review of Systems   Constitutional: Negative for chills.   Cardiovascular:  Negative for chest pain, claudication, cyanosis, dyspnea on exertion, irregular heartbeat, leg swelling, near-syncope, orthopnea, palpitations, paroxysmal nocturnal dyspnea and syncope.   Respiratory:  Negative for cough and shortness of breath.    Musculoskeletal:         He has had some posterior neck discomfort   Gastrointestinal:  Negative for heartburn and nausea.   Neurological:  Negative for dizziness, focal weakness, headaches, light-headedness and weakness.        Intermittent neuropathy upper extremities, lower extremities.   All other systems reviewed and are negative.      No Known Allergies  .    Current Outpatient Medications:     aspirin 81 mg chewable tablet, Chew 1 tablet (81 mg total) daily, Disp: 90 tablet, Rfl: 1    atorvastatin (LIPITOR) 20 mg tablet, Take 1 tablet (20 mg total) by mouth daily, Disp: 90 tablet, Rfl: 3    metFORMIN (GLUCOPHAGE) 1000 MG tablet, Take 1 tablet (1,000 mg total) by mouth 2 (two) times a day, Disp: 180 tablet, Rfl: 3    nitroglycerin (NITROSTAT) 0.4 mg SL tablet, Place 1 tablet (0.4 mg total) under the tongue every 5 (five) minutes as needed for chest pain, Disp: 25 tablet, Rfl: 0        Social History     Socioeconomic History    Marital status: Single     Spouse name: Not on file    Number of children: Not on file    Years of education: Not on file    Highest education level: Not on file   Occupational History    Not on file   Tobacco Use    Smoking status:  "Former     Current packs/day: 0.00     Average packs/day: 1 pack/day for 10.0 years (10.0 ttl pk-yrs)     Types: Cigarettes, Cigars     Start date: 2011     Quit date: 2021     Years since quittin.8    Smokeless tobacco: Never   Vaping Use    Vaping status: Never Used   Substance and Sexual Activity    Alcohol use: Not Currently    Drug use: Yes     Types: Marijuana     Comment: \"heavily\"    Sexual activity: Yes     Partners: Female   Other Topics Concern    Not on file   Social History Narrative    Not working. States that he is on SSI for his diabetes??     Social Determinants of Health     Financial Resource Strain: Not on file   Food Insecurity: Not on file   Transportation Needs: Not on file   Physical Activity: Not on file   Stress: Not on file   Social Connections: Not on file   Intimate Partner Violence: Not on file   Housing Stability: Not on file       Family History   Problem Relation Age of Onset    Diabetes Mother     Pancreatic cancer Mother     Alcohol abuse Father     No Known Problems Sister     No Known Problems Brother     Mental illness Daughter     No Known Problems Brother     No Known Problems Sister     No Known Problems Sister        Physical Exam  Vitals and nursing note reviewed.   Constitutional:       General: He is not in acute distress.  HENT:      Head: Normocephalic and atraumatic.   Eyes:      Conjunctiva/sclera: Conjunctivae normal.   Cardiovascular:      Rate and Rhythm: Normal rate and regular rhythm.      Pulses: Intact distal pulses.      Heart sounds: Normal heart sounds.   Pulmonary:      Effort: Pulmonary effort is normal.      Breath sounds: Normal breath sounds.   Abdominal:      General: Bowel sounds are normal.      Palpations: Abdomen is soft.   Musculoskeletal:         General: Normal range of motion.      Cervical back: Normal range of motion and neck supple.   Skin:     General: Skin is warm and dry.   Neurological:      Mental Status: He is alert and " "oriented to person, place, and time.         Vitals: Blood pressure 138/72, pulse 70, height 6' (1.829 m), weight 92.8 kg (204 lb 9.6 oz), SpO2 97%.   Wt Readings from Last 3 Encounters:   24 92.8 kg (204 lb 9.6 oz)   23 93.1 kg (205 lb 3.2 oz)   23 98.9 kg (218 lb)         Labs & Results:  Lab Results   Component Value Date    WBC 9.34 2024    HGB 14.8 2024    HCT 44.8 2024    MCV 89 2024     2024     No results found for: \"BNP\"  No components found for: \"CHEM\"  Troponin I   Date Value Ref Range Status   2020 0.12 (H) <=0.04 ng/mL Final     Comment:       [employer] Chemistry analyzer 99% cutoff is > 0.04 ng/mL in network labs     o cTnI 99% cutoff is useful only when applied to patients in the clinical setting of myocardial ischemia   o cTnI 99% cutoff should be interpreted in the context of clinical history, ECG findings and possibly cardiac imaging to establish correct diagnosis.   o cTnI 99% cutoff may be suggestive but clearly not indicative of a coronary event without the clinical setting of myocardial ischemia.    Results indicate test should be repeated on new specimen collected within 4-6 hours of the original     Results for orders placed during the hospital encounter of 20    Echo complete with contrast if indicated    [address] [address] [phone]    Transthoracic Echocardiogram  2D, M-mode, Doppler, and Color Doppler    Study date:  2020    Patient: TYLER LEGER  MR number: [id number]  Account number: [id number]  : 1965  Age: 54 years  Gender: Male  Status: Inpatient  Location: Bedside  Height: 72 in  Weight: 223.5 lb  BP: 145/ 92 mmHg    Indications: MI    Diagnoses: I21.3 - ST elevation (STEMI) myocardial infarction of unspecified site    Sonographer:  Irena Villeda RDCS  Interpreting Physician:  Vianney Blackwell MD  Referring Physician:  Vianney Blackwell MD  Group:  [facility]" [facility]    SUMMARY    LEFT VENTRICLE:  Systolic function was normal.  There were no definite regional wall motion abnormalities. Possible mild anteroseptal hypokinesis.  Wall thickness was increased.  Hypertrophy was noted.  Left ventricular diastolic function parameters were normal.    TRICUSPID VALVE:  Estimated peak PA pressure was 35 - 40 mmHg.    HISTORY: PRIOR HISTORY: DM, Smoker    PROCEDURE: The procedure was performed at the bedside. This was a routine study. The transthoracic approach was used. The study included complete 2D imaging, M-mode, complete spectral Doppler, and color Doppler. The heart rate was 67 bpm,  at the start of the study. Images were obtained from the parasternal, apical, subcostal, and suprasternal notch acoustic windows. Image quality was adequate.    LEFT VENTRICLE: Size was normal. Systolic function was normal. There were no definite regional wall motion abnormalities. Possible mild anteroseptal hypokinesis. Wall thickness was increased. Hypertrophy was noted. DOPPLER: The ratio of  early ventricular filling to atrial contraction velocities was within the normal range. Left ventricular diastolic function parameters were normal.    RIGHT VENTRICLE: The size was normal. Systolic function was normal.    LEFT ATRIUM: The atrium was mildly dilated.    RIGHT ATRIUM: The atrium was mildly dilated.    MITRAL VALVE: Valve structure was normal. There was normal leaflet separation. DOPPLER: There was no evidence for stenosis. There was trace regurgitation.    AORTIC VALVE: The valve was probably trileaflet. Leaflets exhibited normal thickness and normal cuspal separation. DOPPLER: There was no evidence for stenosis. There was no significant regurgitation.    TRICUSPID VALVE: The valve structure was normal. There was normal leaflet separation. DOPPLER: There was no evidence for stenosis. There was mild regurgitation. Estimated peak PA pressure was 35 - 40 mmHg.    PULMONIC  VALVE: Leaflets exhibited normal thickness, no calcification, and normal cuspal separation. DOPPLER: The transpulmonic velocity was within the normal range. There was trace regurgitation.    PERICARDIUM: There was no pericardial effusion. The pericardium was normal in appearance.    AORTA: The root exhibited normal size.    SYSTEM MEASUREMENT TABLES    2D  %FS: 36.46 %  Ao Diam: 2.8 cm  EDV(Teich): 126.07 ml  EF(Teich): 65.86 %  ESV(Teich): 43.03 ml  IVSd: 1.26 cm  LA Area: 18.92 cm2  LA Diam: 3.24 cm  LVEDV MOD A4C: 105.93 ml  LVEF MOD A4C: 62.01 %  LVESV MOD A4C: 40.24 ml  LVIDd: 5.14 cm  LVIDs: 3.27 cm  LVLd A4C: 9.15 cm  LVLs A4C: 7.25 cm  LVPWd: 1 cm  RA Area: 20.84 cm2  RVIDd: 3.43 cm  SV MOD A4C: 65.68 ml  SV(Teich): 83.04 ml    CW  TR MaxP.16 mmHg  TR Vmax: 2.92 m/s    PW  E': 0.09 m/s  E/E': 11.08  MV A Jg: 0.81 m/s  MV Dec [place]: 5.31 m/s2  MV DecT: 180.64 ms  MV E Jg: 0.96 m/s  MV E/A Ratio: 1.19  MV PHT: 52.39 ms  MVA By PHT: 4.2 cm2    [employer] Accredited Echocardiography Laboratory    Prepared and electronically signed by    Vianney Blackwell MD  Signed 2020 15:56:52    No results found for this or any previous visit.      This note was completed in part utilizing [employer] direct voice recognition software.   Grammatical errors, random word insertion, spelling mistakes, and incomplete sentences may be an occasional consequence of the system secondary to software limitations, ambient noise and hardware issues. At the time of dictation, efforts were made to edit, clarify and /or correct errors.  Please read the chart carefully and recognize, using context, where substitutions have occurred.  If you have any questions or concerns about the context, text or information contained within the body of this dictation, please contact myself, the provider, for further clarification

## 2024-02-19 ENCOUNTER — OFFICE VISIT (OUTPATIENT)
Dept: CARDIOLOGY CLINIC | Facility: CLINIC | Age: 59
End: 2024-02-19
Payer: COMMERCIAL

## 2024-02-19 VITALS
HEART RATE: 70 BPM | OXYGEN SATURATION: 97 % | DIASTOLIC BLOOD PRESSURE: 72 MMHG | SYSTOLIC BLOOD PRESSURE: 138 MMHG | HEIGHT: 72 IN | BODY MASS INDEX: 27.71 KG/M2 | WEIGHT: 204.6 LBS

## 2024-02-19 DIAGNOSIS — E11.42 TYPE 2 DIABETES MELLITUS WITH DIABETIC POLYNEUROPATHY, WITHOUT LONG-TERM CURRENT USE OF INSULIN (HCC): ICD-10-CM

## 2024-02-19 DIAGNOSIS — I25.10 CORONARY ARTERY DISEASE INVOLVING NATIVE CORONARY ARTERY OF NATIVE HEART WITHOUT ANGINA PECTORIS: Primary | ICD-10-CM

## 2024-02-19 DIAGNOSIS — Z87.891 FORMER SMOKER: ICD-10-CM

## 2024-02-19 DIAGNOSIS — E78.2 MIXED HYPERLIPIDEMIA: ICD-10-CM

## 2024-02-19 DIAGNOSIS — F12.90 MARIJUANA USE: ICD-10-CM

## 2024-02-19 PROCEDURE — 99214 OFFICE O/P EST MOD 30 MIN: CPT | Performed by: NURSE PRACTITIONER

## 2024-02-19 PROCEDURE — 93000 ELECTROCARDIOGRAM COMPLETE: CPT | Performed by: NURSE PRACTITIONER

## 2024-02-19 NOTE — LETTER
February 19, 2024     ERIC Martin  [address]   [address]  [place] [address]    Patient: Jah Siegel   YOB: 1965   Date of Visit: 2/19/2024       Dear Dr. Garcia:    Thank you for referring Jah Siegel to me for evaluation. Below are my notes for this consultation.    If you have questions, please do not hesitate to call me. I look forward to following your patient along with you.         Sincerely,        ERIC Ross        CC: MD Nicole Morejon CRNP  2/19/2024  8:37 AM  Sign when Signing Visit  Cardiology   Acute  Office Visit Note  Jah Siegel   58 y.o.   male   MRN: [id number]  [facility] [place]  [address]  [address]  [place] [id number] [phone]    PCP: ERIC Martin  Cardiologist:   Dr. KASH Chanel                Summary of recommendations  Heart healthy diet. Educational information provided  Follow up will be scheduled with Dr Chanel        Assessment/plan  CAD; S/P AWMI, S/P PCI with 2 overlapping HALI prLAD 1/2020  on aspirin, high-intensity statin, beta-blocker  EKG today:NSR 70 bpm.  PACs, in a begiminy fashion  /72  Hyperlipidemia, on atorvastatin 40 mg daily.  Previously after his stent he was off for a period of time on his own accord, and then restarted  Lipid panel 6/2/23: calc LDL 23, non HDL 32, at goal   Latest Reference Range & Units 01/07/20 04:33 06/02/23 09:07   Cholesterol See Comment mg/dL 125 79   Triglycerides See Comment mg/dL 39 44   HDL >=40 mg/dL 56 47   Non-HDL Cholesterol mg/dl  32   LDL Calculated 0 - 100 mg/dL 61 23   Type 2 diabetes mellitus.  4/13/23: HgA1c 5.9, at goal. On insulin and metformin   Latest Reference Range & Units 03/11/21 10:24 06/16/21 14:12 05/12/22 09:25 04/13/23 09:42   Hemoglobin A1C 6.5  5.4 5.9 5.9 5.9     Marijuana use daily. Used since 12 yo  Tobacco abuse.  Quit 4/2021 after a 10-pack-year  history.  Cardiac testing  TE 1/6/2020.normal LV size and function, possible mild anteroseptal hypokinesis, LVH, normal RV size and function, PASP 35-40 mm Hg.   cardiac catheterization 1/6/20  Left main: Normal.  LAD: The vessel was normal sized. There was a 90% proximal stenosis. This was the culprit for the patient's anterior STEMI. The diagonal branches were free of obstructive disease.  Circumflex: The vessel was normal sized and gave rise to two OM brancvhes. There were no significant lesions.  RCA: The vessel was normal sized and dominant, giving rise to the PDA and a posterolateral branch. No atherosclerosis was seen  -->Single vessel disease, with a 90% proximal LAD stenosis representing the culprit for the patient's anterior STEMI.  Successful IVUS-guided PCI, proximal LAD, with placement of two overlapping drug-eluting stents.  Mildly-moderately elevated LVEDP  TTE 10/27/21. EF 65%.  Borderline LVH.  Diastolic function normal for age.  Mild LAE.  Trace AI.  Trace MR.  Mild-to-moderate TR.  PA P 20 5 mm Hg                     HPI  Jah Siegel is a 53 yo male with diabetes, tobacco abuse and marijuana use.  He presented to [facility] as a pre-hospital MI alert on 1/6/20.  That morning he went to the gym and upon returning home he developed 10/10 midsternal substernal chest pain and abdominal pain with radiation to his both his arms, back and abdomen with associated dyspnea.  His EKG on arrival showed anterior ST elevation with reciprocal ST changes in the inferior leads.  He was taken emergently for coronary angiography.  This demonstrated a 90% proximal LAD stenosis.  He underwent PCI and deployment of 2 overlapping drug-eluting stents with a good result.  An echocardiogram demonstrated preserved LV function.  He was placed on dual antiplatelet therapy with aspirin and Brilinta. He was placed on a high-intensity statin.  He was advised complete tobacco cessation.  Reports he only  "smokes cigars every other day to help increase the affects of marijuana that he smokes regularly.  He is agreeable to stopping smoking cigars but desires to continue marijuana use.  His echocardiogram showed preserved LV function possible mild anterior septal hypokinesis and left ventricular hypertrophy.  RV size was normal.    8/30/21  General cardiology follow-up.    This is his 1st office visit since January 2020 when he presented with an anterior MI and received overlapping2 drug-eluting stents to his  Proximal LAD.  He has transportation issues.  He has no car.  He tries to protect himself given the COVID-19 pandemic.  He does not like to use public transportation with the bus, etc. .  Reports where he lives most people are not masking.  He did receive the  PfizerCOVID-19 vaccine himself, May 19 and Yulissa 10.  Today, he received a ride from [employer].  Unfortunately, they were late.  He need to call alternative transportation.  Our visit was abbreviated, given this.  He is on disability, since around 2007.  He works out regularly, at home.  He does weights, and has a lot of lifting equipment.  He does ride the stationary bike, \"for fun\".  Sometimes this can be for 10 minutes or 30 minutes or 2 hours.  With his usual activity, he denies chest pain, shortness of breath, palpitations lightheadedness or dizziness. He currently has a broken toe on his left foot    He continues to smoke marijuana, daily.  He tells me he has chronic lower extremity neuropathy from his diabetes and this helps.  He smokes 1 cigar a day.  He denies any cigarette use.    He is compliant with his medications.  He has had refills, per his PCP.    For now, he will remain on Brilinta 60 mg b.i.d. along with his aspirin.    Will get an updated echocardiogram.  He will return in a short interval, to meet an office cardiologist for the 1st time.  He has a full set of labs requested, by his PCP which he anticipates getting very soon.  His EKG today in " the office showed sinus rhythm 68 beats per minute with normal intervals. Blood pressure by me 132/90 in the left arm. He tells me it was elevated because he was angry about his transportation issues.  Will continue to monitor.  He does not have equipment at home    Interval history  Never followed up in the office  Has not seen a cardiologist in the office    8/22/22  Overdue follow-up  He saw his PCP last, 5/12/22.  Full labs have been ordered  He is no longer taking atorvastatin.  He is not taking the metoprolol.  He said that we stopped them.  I re-educated him, that he needs to follow up in the office regularly in order to get medications refilled, and that we did not stop them  Current medications include ticagrelor 60 mg q.12 and metformin.  He was given a short prescription of ticagrelor until re-evaluated in the office  He is not taking the metoprolol, the atorvastatin nor aspirin  He is on disability from [employer] related to his diabetes he tells me.  He does not work.  He has neuropathy and issues with his eyes  He quit smoking tobacco 13 months ago.  He was commended.  He tells me he was smoking unfiltered cigarettes with a very high nicotine level  He continues to smoke marijuana on a daily basis and has since he was 13 years old.  He was educated that this could cause a cardiomyopathy.  He exercises regularly.  He says every day he does a 1000 pushups and 100 pull-ups. He also has a treadmill but does not use that often.    He denies chest pain, shortness of breath. lightheadedness or dizziness.  I recommended regular follow-up in adherence to medical therapy  He tells me he will get his labs in the near future.  He will follow with a cardiologist at the [facility]    Interval history  11/30/22; 7/5/23 OV Dr Chanel  At the last OV, per his note:   Coronary artery disease involving native coronary artery of native heart without angina pectoris: Anterior STEMI - 01/06/2020 pre-hospital MI  alert. 90% proximal LAD stenosis.  He underwent PCI/HALI x 2 overlapping. Good expansion and apposition by IVUS. Remaining vessels without obstructive disease. He completed >12 months DAPT with aspirin and ticagrelor. Subsequently discontinued aspirin and continued ticagrelor. Former smoker.  -Continue aspirin 81 mg daily  -Self discontinued beta blocker earlier than 3 years post MI. Currently without anginal complaints.      Type 2 diabetes mellitus with diabetic polyneuropathy, without long-term current use of insulin (HCC): Last hemoglobin A1c 5.9%. continue metformin.     Mixed hyperlipidemia: Very good lipid control and physical activity at goal. Lipid panel with TC 79, TGs 44, HDL 47, LDL 23. Wants to reduce amount of medications but advised that he should remain on statin therapy. We can attempt atorvastatin 20 mg and repeat lipid panel prior to follow up.  -     atorvastatin (LIPITOR) 20 mg tablet; Take 1 tablet (20 mg total) by mouth daily  -     Lipid Panel with Direct LDL reflex; Future         1/23/24 ED visit. Psychiatric issue  Aggression,agitation and homicidal ideas   -->Acc by Police  Being evicted from his home  UA: + THC  DCd to [facility] unit after medically cleared, on a 302  Dx: paranoia and delusions      2/19/24  Acute OV  PMH: CAD;history anterior STEMI s/p HALI x 2 LAD (1/2020), HTN, HL,DM2, recent admission for behavioral health issue as above. Daily THC use    He was concerned about his stents given that he was tased January 23, above, and the police incident.reports he was manhandled by 6 police officers      Arrived by Justin    138/72  EKG NSR 70. PACS, in a begiminy fashion  ROS: Intermittent numbness of the left arm, some posterior neck pain.  He denies chest pain shortness of breath lightheadedness or dizziness.  Since being discharged from [facility] February 2, he has been exercising daily per his usual.  He reports completing over 500 push-ups a day, and 120 pull-ups a  day.  With his activity, again he denies any exertional symptoms.  Reports he has had some neuropathy over time, of the upper and lower extremities.    He participates in regular activity without any symptoms.  His blood pressure is satisfactory.  His EKG is within normal limits.  With respect to his concern of her stents, I reassured him.  He is adherent to his medications, as directed.  I recommend close follow-up, in 4 months with his usual cardiologist      Assessment  Diagnoses and all orders for this visit:    Coronary artery disease involving native coronary artery of native heart without angina pectoris  -     POCT ECG    Type 2 diabetes mellitus with diabetic polyneuropathy, without long-term current use of insulin ([facility])    Former smoker    Mixed hyperlipidemia    Marijuana use            Past Medical History:   Diagnosis Date   • Acute ST segment elevation myocardial infarction ([facility])    • Hyperlipidemia    • Myocardial infarction ([facility])    • Neuropathy    • Nocturia    • Type II diabetes mellitus ([facility])    • Vitamin D deficiency        Review of Systems   Constitutional: Negative for chills.   Cardiovascular:  Negative for chest pain, claudication, cyanosis, dyspnea on exertion, irregular heartbeat, leg swelling, near-syncope, orthopnea, palpitations, paroxysmal nocturnal dyspnea and syncope.   Respiratory:  Negative for cough and shortness of breath.    Musculoskeletal:         He has had some posterior neck discomfort   Gastrointestinal:  Negative for heartburn and nausea.   Neurological:  Negative for dizziness, focal weakness, headaches, light-headedness and weakness.        Intermittent neuropathy upper extremities, lower extremities.   All other systems reviewed and are negative.      No Known Allergies  .    Current Outpatient Medications:   •  aspirin 81 mg chewable tablet, Chew 1 tablet (81 mg total) daily, Disp: 90 tablet, Rfl: 1  •  atorvastatin (LIPITOR) 20 mg tablet, Take 1 tablet (20 mg total)  "by mouth daily, Disp: 90 tablet, Rfl: 3  •  metFORMIN (GLUCOPHAGE) 1000 MG tablet, Take 1 tablet (1,000 mg total) by mouth 2 (two) times a day, Disp: 180 tablet, Rfl: 3  •  nitroglycerin (NITROSTAT) 0.4 mg SL tablet, Place 1 tablet (0.4 mg total) under the tongue every 5 (five) minutes as needed for chest pain, Disp: 25 tablet, Rfl: 0        Social History     Socioeconomic History   • Marital status: Single     Spouse name: Not on file   • Number of children: Not on file   • Years of education: Not on file   • Highest education level: Not on file   Occupational History   • Not on file   Tobacco Use   • Smoking status: Former     Current packs/day: 0.00     Average packs/day: 1 pack/day for 10.0 years (10.0 ttl pk-yrs)     Types: Cigarettes, Cigars     Start date: 2011     Quit date: 2021     Years since quittin.8   • Smokeless tobacco: Never   Vaping Use   • Vaping status: Never Used   Substance and Sexual Activity   • Alcohol use: Not Currently   • Drug use: Yes     Types: Marijuana     Comment: \"heavily\"   • Sexual activity: Yes     Partners: Female   Other Topics Concern   • Not on file   Social History Narrative    Not working. States that he is on SSI for his diabetes??     Social Determinants of Health     Financial Resource Strain: Not on file   Food Insecurity: Not on file   Transportation Needs: Not on file   Physical Activity: Not on file   Stress: Not on file   Social Connections: Not on file   Intimate Partner Violence: Not on file   Housing Stability: Not on file       Family History   Problem Relation Age of Onset   • Diabetes Mother    • Pancreatic cancer Mother    • Alcohol abuse Father    • No Known Problems Sister    • No Known Problems Brother    • Mental illness Daughter    • No Known Problems Brother    • No Known Problems Sister    • No Known Problems Sister        Physical Exam  Vitals and nursing note reviewed.   Constitutional:       General: He is not in acute distress.  HENT:    " "  Head: Normocephalic and atraumatic.   Eyes:      Conjunctiva/sclera: Conjunctivae normal.   Cardiovascular:      Rate and Rhythm: Normal rate and regular rhythm.      Pulses: Intact distal pulses.      Heart sounds: Normal heart sounds.   Pulmonary:      Effort: Pulmonary effort is normal.      Breath sounds: Normal breath sounds.   Abdominal:      General: Bowel sounds are normal.      Palpations: Abdomen is soft.   Musculoskeletal:         General: Normal range of motion.      Cervical back: Normal range of motion and neck supple.   Skin:     General: Skin is warm and dry.   Neurological:      Mental Status: He is alert and oriented to person, place, and time.         Vitals: Blood pressure 138/72, pulse 70, height 6' (1.829 m), weight 92.8 kg (204 lb 9.6 oz), SpO2 97%.   Wt Readings from Last 3 Encounters:   02/19/24 92.8 kg (204 lb 9.6 oz)   11/21/23 93.1 kg (205 lb 3.2 oz)   07/05/23 98.9 kg (218 lb)         Labs & Results:  Lab Results   Component Value Date    WBC 9.34 01/23/2024    HGB 14.8 01/23/2024    HCT 44.8 01/23/2024    MCV 89 01/23/2024     01/23/2024     No results found for: \"BNP\"  No components found for: \"CHEM\"  Troponin I   Date Value Ref Range Status   01/06/2020 0.12 (H) <=0.04 ng/mL Final     Comment:       [employer] Chemistry analyzer 99% cutoff is > 0.04 ng/mL in network labs     o cTnI 99% cutoff is useful only when applied to patients in the clinical setting of myocardial ischemia   o cTnI 99% cutoff should be interpreted in the context of clinical history, ECG findings and possibly cardiac imaging to establish correct diagnosis.   o cTnI 99% cutoff may be suggestive but clearly not indicative of a coronary event without the clinical setting of myocardial ischemia.    Results indicate test should be repeated on new specimen collected within 4-6 hours of the original     Results for orders placed during the hospital encounter of 01/06/20    Echo complete with contrast if " [facility]  [address] [place] [address]  [phone]    Transthoracic Echocardiogram  2D, M-mode, Doppler, and Color Doppler    Study date:  2020    Patient: TYLER LEGER  MR number: [id number]  Account number: [id number]  : 1965  Age: 54 years  Gender: Male  Status: Inpatient  Location: Bedside  Height: 72 in  Weight: 223.5 lb  BP: 145/ 92 mmHg    Indications: MI    Diagnoses: I21.3 - ST elevation (STEMI) myocardial infarction of unspecified site    Sonographer:  Irena Villeda RDCS  Interpreting Physician:  Vianney Blackwell MD  Referring Physician:  Vianney Blackwell MD  Group:  [facility]    SUMMARY    LEFT VENTRICLE:  Systolic function was normal.  There were no definite regional wall motion abnormalities. Possible mild anteroseptal hypokinesis.  Wall thickness was increased.  Hypertrophy was noted.  Left ventricular diastolic function parameters were normal.    TRICUSPID VALVE:  Estimated peak PA pressure was 35 - 40 mmHg.    HISTORY: PRIOR HISTORY: DM, Smoker    PROCEDURE: The procedure was performed at the bedside. This was a routine study. The transthoracic approach was used. The study included complete 2D imaging, M-mode, complete spectral Doppler, and color Doppler. The heart rate was 67 bpm,  at the start of the study. Images were obtained from the parasternal, apical, subcostal, and suprasternal notch acoustic windows. Image quality was adequate.    LEFT VENTRICLE: Size was normal. Systolic function was normal. There were no definite regional wall motion abnormalities. Possible mild anteroseptal hypokinesis. Wall thickness was increased. Hypertrophy was noted. DOPPLER: The ratio of  early ventricular filling to atrial contraction velocities was within the normal range. Left ventricular diastolic function parameters were normal.    RIGHT VENTRICLE: The size was normal. Systolic function was normal.    LEFT ATRIUM: The atrium was  mildly dilated.    RIGHT ATRIUM: The atrium was mildly dilated.    MITRAL VALVE: Valve structure was normal. There was normal leaflet separation. DOPPLER: There was no evidence for stenosis. There was trace regurgitation.    AORTIC VALVE: The valve was probably trileaflet. Leaflets exhibited normal thickness and normal cuspal separation. DOPPLER: There was no evidence for stenosis. There was no significant regurgitation.    TRICUSPID VALVE: The valve structure was normal. There was normal leaflet separation. DOPPLER: There was no evidence for stenosis. There was mild regurgitation. Estimated peak PA pressure was 35 - 40 mmHg.    PULMONIC VALVE: Leaflets exhibited normal thickness, no calcification, and normal cuspal separation. DOPPLER: The transpulmonic velocity was within the normal range. There was trace regurgitation.    PERICARDIUM: There was no pericardial effusion. The pericardium was normal in appearance.    AORTA: The root exhibited normal size.    SYSTEM MEASUREMENT TABLES    2D  %FS: 36.46 %  Ao Diam: 2.8 cm  EDV(Teich): 126.07 ml  EF(Teich): 65.86 %  ESV(Teich): 43.03 ml  IVSd: 1.26 cm  LA Area: 18.92 cm2  LA Diam: 3.24 cm  LVEDV MOD A4C: 105.93 ml  LVEF MOD A4C: 62.01 %  LVESV MOD A4C: 40.24 ml  LVIDd: 5.14 cm  LVIDs: 3.27 cm  LVLd A4C: 9.15 cm  LVLs A4C: 7.25 cm  LVPWd: 1 cm  RA Area: 20.84 cm2  RVIDd: 3.43 cm  SV MOD A4C: 65.68 ml  SV(Teich): 83.04 ml    CW  TR MaxP.16 mmHg  TR Vmax: 2.92 m/s    PW  E': 0.09 m/s  E/E': 11.08  MV A Jg: 0.81 m/s  MV Dec Morrill: 5.31 m/s2  MV DecT: 180.64 ms  MV E Jg: 0.96 m/s  MV E/A Ratio: 1.19  MV PHT: 52.39 ms  MVA By PHT: 4.2 cm2    [employer] Commission Accredited Echocardiography Laboratory    Prepared and electronically signed by    Vianney Blackwell MD  Signed 2020 15:56:52    No results found for this or any previous visit.      This note was completed in part utilizing [employer] direct voice recognition software.   Grammatical errors, random  word insertion, spelling mistakes, and incomplete sentences may be an occasional consequence of the system secondary to software limitations, ambient noise and hardware issues. At the time of dictation, efforts were made to edit, clarify and /or correct errors.  Please read the chart carefully and recognize, using context, where substitutions have occurred.  If you have any questions or concerns about the context, text or information contained within the body of this dictation, please contact myself, the provider, for further clarification

## 2024-02-19 NOTE — PATIENT INSTRUCTIONS
DASH Eating Plan   WHAT YOU NEED TO KNOW:   The DASH (Dietary Approaches to Stop Hypertension) Eating Plan is designed to help prevent or lower high blood pressure. It can also help to lower LDL (bad) cholesterol and decrease your risk for heart disease. The plan is low in sodium, sugar, unhealthy fats, and total fat. It is high in potassium, calcium, magnesium, and fiber. These nutrients are added when you eat more fruits, vegetables, and whole grains. With the DASH eating plan, you need to eat a certain number of servings from each food group. This will help you get enough of certain nutrients and limit others. The amount of servings you should eat depends on how many calories you need. Your dietitian can help you create meal plans with the right number of servings for each food group.       DISCHARGE INSTRUCTIONS:   What you need to know about sodium:  Your dietitian will tell you how much sodium is safe for you to have each day. People with high blood pressure should have no more than 1,500 to 2,300 mg of sodium in a day. A teaspoon (tsp) of salt has 2,300 mg of sodium. This may seem like a difficult goal, but small changes to the foods you eat can make a big difference. Your healthcare provider or dietitian can help you create a meal plan that follows your sodium limit.  Read food labels.  Food labels can help you choose foods that are low in sodium. The amount of sodium is listed in milligrams (mg). The % Daily Value (DV) column tells you how much of your daily needs are met by 1 serving of the food for each nutrient listed. Choose foods that have less than 5% of the DV of sodium. These foods are considered low in sodium. Foods that have 20% or more of the DV of sodium are considered high in sodium. Avoid foods that have more than 300 mg of sodium in each serving. Choose foods that say low-sodium, reduced-sodium, or no salt added on the food label.         Limit added salt.  Do not salt food at the table if  you add salt when you cook. Use herbs and spices, such as onions, garlic, and salt-free seasonings to add flavor. Try lemon or lime juice or vinegar to add a tart flavor. Use hot peppers or a small amount of hot pepper sauce to add a spicy flavor. Limit foods high in added salt, such as the following:    Seasonings made with salt, such as garlic salt, celery salt, onion salt, seasoned salt, meat tenderizers, and monosodium glutamate (MSG)    Miso soup and canned or dried soup mixes    Regular soy sauce, barbecue sauce, teriyaki sauce, steak sauce, Worcestershire sauce, and most flavored vinegars    Snack foods, such as salted chips, popcorn, pretzels, pork rinds, salted crackers, and salted nuts    Frozen foods, such as dinners, entrees, vegetables with sauces, and breaded meats    Ask about salt substitutes.  Ask your healthcare provider if you may use salt substitutes. Some salt substitutes have ingredients that can be harmful if you have certain health conditions.    Choose foods carefully at restaurants.  Meals from restaurants, especially fast food restaurants, are often high in sodium. Some restaurants have nutrition information that tells you the amount of sodium in their foods. Ask to have your food prepared with less, or no salt.    What you need to know about fats:  Healthy fats include unsaturated fats and omega-3 fatty acids. Unhealthy fats include saturated fats and trans fats.  Include healthy fats, such as the following:      Cooking oils, such as soybean, canola, olive, or sunflower    Fatty fish, such as salmon, tuna, mackerel, or sardines    Flaxseed oil or ground flaxseed    ½ cup of cooked beans, such as black beans, kidney beans, or yun beans    1½ ounces of low-sodium nuts, such as almonds or walnuts    Low-sugar, low-sodium peanut butter    Seeds such as luis seeds or sunflower seeds       Limit or do not have unhealthy fats, such as the following:      Foods that contain fat from animals,  such as fatty meats, whole milk, butter, and cream    Shortening, stick margarine, palm oil, and coconut oil    Full-fat or creamy salad dressing    Creamy soup    Crackers, chips, and baked goods made with margarine or shortening    Foods that are fried in unhealthy fats    Gravy and sauces, such as [place] or cheese sauces    What you need to know about carbohydrates (carbs):  All carbs break down into sugar. Complex carbs contain more fiber than simple carbs. This means complex carbs go into the bloodstream more slowly and cause less of a blood sugar spike. Try to include more complex carbs and fewer simple carbs.  Include complex carbs, such as the followin slice of whole-grain bread    1 ounce of dry cereal that does not contain added sugar    ½ cup of cooked oatmeal    2 ounces of cooked whole-grain pasta    ½ cup of cooked brown rice    Limit or do not have simple carbs, such as the following:      Baked goods, such as doughnuts, pastries, and cookies    Mixes for cornbread and biscuits    White rice and pasta mixes, such as boxed macaroni and cheese    Instant and cold cereals that contain sugar    Jelly, jam, and ice cream that contain sugar    Condiments such as ketchup    Drinks high in sugar, such as soft drinks, lemonade, and fruit juice    What you need to know about vegetables and fruits:  Vegetables and fruits can be fresh, frozen, or canned. If possible, try to choose low-sodium canned options.  Include a variety of vegetables and fruits, such as the followin medium apple, pear, or peach (about ½ cup chopped)    ½ small banana    ½ cup berries, such as blueberries, strawberries, or blackberries    1 cup of raw leafy greens, such as lettuce, spinach, kale, or hilario greens    ½ cup of frozen or canned (no added salt) vegetables, such as green beans    ½ cup of fresh, frozen, or canned fruit (canned in light syrup or fruit juice)    ½ cup of vegetable or fruit juice    Limit or do  not have vegetables and fruits made in the following ways:      Frozen fruit such as cherries that have added sugar    Fruit in cream or butter sauce    Canned vegetables that are high in sodium    Sauerkraut, pickled vegetables, and other foods prepared in brine    Fried vegetables or vegetables in butter or high-fat sauces    What you need to know about protein foods:   Include lean or low-fat protein foods, such as the following:      Poultry (chicken, turkey) with no skin    Fish (especially fatty fish, such as salmon, fresh tuna, or mackerel)    Lean beef and pork (loin, round, extra lean hamburger)    Egg whites and egg substitutes    1 cup of nonfat (skim) or 1% milk    1½ ounces of fat-free or low-fat cheese    6 ounces of nonfat or low-fat yogurt    Limit or do not have high-fat protein foods, such as the following:      Smoked or cured meat, such as corned beef, olson, ham, hot dogs, and sausage    Canned beans and canned meats or spreads, such as potted meats, sardines, anchovies, and imitation seafood    Deli or lunch meats, such as bologna, ham, turkey, and roast beef    High-fat meat (T-bone steak, regular hamburger, and ribs)    Whole eggs and egg yolks    Whole milk, 2% milk, and cream    Regular cheese and processed cheese    Other guidelines to follow:   Maintain a healthy weight.  Your risk for heart disease is higher if you have extra weight. Ask your healthcare provider what a healthy weight is for you. Your provider may suggest that you lose weight. You can lose weight by eating fewer calories and foods that have added sugars and fat. The DASH meal plan can help you do this. Decrease calories by eating smaller portions at each meal and fewer snacks. Ask your provider for more information about how to lose weight.    Exercise regularly.  Regular exercise can help you reach or maintain a healthy weight. Regular exercise can also help decrease your blood pressure and improve your cholesterol  levels. Get 30 minutes or more of moderate exercise each day of the week. To lose weight, get at least 60 minutes of exercise. Talk to your healthcare provider about the best exercise program for you.         Limit alcohol.  Women should limit alcohol to 1 drink a day. Men should limit alcohol to 2 drinks a day. A drink of alcohol is 12 ounces of beer, 5 ounces of wine, or 1½ ounces of liquor.    For more information:   [facility] [place]  [facility]  [address] [address]  [place] [address]  Phone: [phone]  Web Address: http://www.nhlbi.nih.gov/health/infoctr/index.htm    © Copyright [employer] 2023 Information is for End User's use only and may not be sold, redistributed or otherwise used for commercial purposes.  The above information is an  only. It is not intended as medical advice for individual conditions or treatments. Talk to your doctor, nurse or pharmacist before following any medical regimen to see if it is safe and effective for you.

## 2024-02-21 ENCOUNTER — TELEPHONE (OUTPATIENT)
Age: 59
End: 2024-02-21

## 2024-02-23 ENCOUNTER — TELEPHONE (OUTPATIENT)
Dept: FAMILY MEDICINE CLINIC | Facility: CLINIC | Age: 59
End: 2024-02-23

## 2024-02-23 ENCOUNTER — OFFICE VISIT (OUTPATIENT)
Dept: FAMILY MEDICINE CLINIC | Facility: CLINIC | Age: 59
End: 2024-02-23
Payer: COMMERCIAL

## 2024-02-23 VITALS
DIASTOLIC BLOOD PRESSURE: 80 MMHG | HEART RATE: 76 BPM | WEIGHT: 207 LBS | TEMPERATURE: 97.7 F | BODY MASS INDEX: 28.07 KG/M2 | SYSTOLIC BLOOD PRESSURE: 150 MMHG | OXYGEN SATURATION: 97 %

## 2024-02-23 DIAGNOSIS — R03.0 ELEVATED BLOOD PRESSURE READING: ICD-10-CM

## 2024-02-23 DIAGNOSIS — L03.116 CELLULITIS OF LEFT LOWER EXTREMITY: Primary | ICD-10-CM

## 2024-02-23 PROCEDURE — 99214 OFFICE O/P EST MOD 30 MIN: CPT

## 2024-02-23 RX ORDER — CEPHALEXIN 500 MG/1
500 CAPSULE ORAL 3 TIMES DAILY
Qty: 21 CAPSULE | Refills: 0 | Status: SHIPPED | OUTPATIENT
Start: 2024-02-23 | End: 2024-03-01
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
## 2024-02-23 NOTE — ASSESSMENT & PLAN NOTE
Pt without dx of HTN states elevated poss secondary to agitation and stress. Recommend monitor at home and f/u for consistently elevated readings.

## 2024-05-10 ENCOUNTER — HOSPITAL ENCOUNTER (EMERGENCY)
Facility: HOSPITAL | Age: 59
Discharge: HOME | End: 2024-05-10
Attending: EMERGENCY MEDICINE
Payer: COMMERCIAL

## 2024-05-10 VITALS
TEMPERATURE: 98.4 F | HEART RATE: 76 BPM | OXYGEN SATURATION: 99 % | SYSTOLIC BLOOD PRESSURE: 146 MMHG | DIASTOLIC BLOOD PRESSURE: 71 MMHG | RESPIRATION RATE: 18 BRPM

## 2024-05-10 DIAGNOSIS — S91.012A LACERATION OF LEFT ANKLE, INITIAL ENCOUNTER: Primary | ICD-10-CM

## 2024-05-10 PROCEDURE — 99285 EMERGENCY DEPT VISIT HI MDM: CPT | Performed by: EMERGENCY MEDICINE

## 2024-05-10 PROCEDURE — 90715 TDAP VACCINE 7 YRS/> IM: CPT | Performed by: EMERGENCY MEDICINE

## 2024-05-10 PROCEDURE — 12004 RPR S/N/AX/GEN/TRK7.6-12.5CM: CPT | Performed by: EMERGENCY MEDICINE

## 2024-05-10 PROCEDURE — 99283 EMERGENCY DEPT VISIT LOW MDM: CPT

## 2024-05-10 PROCEDURE — 90471 IMMUNIZATION ADMIN: CPT

## 2024-05-10 RX ORDER — GINSENG 100 MG
1 CAPSULE ORAL ONCE
Status: COMPLETED | OUTPATIENT
Start: 2024-05-10 | End: 2024-05-10

## 2024-05-10 RX ORDER — CEPHALEXIN 250 MG/1
500 CAPSULE ORAL ONCE
Status: COMPLETED | OUTPATIENT
Start: 2024-05-10 | End: 2024-05-10

## 2024-05-10 RX ORDER — CEPHALEXIN 500 MG/1
500 CAPSULE ORAL EVERY 8 HOURS SCHEDULED
Qty: 15 CAPSULE | Refills: 0 | Status: SHIPPED | OUTPATIENT
Start: 2024-05-10 | End: 2024-05-15

## 2024-05-10 RX ADMIN — CEPHALEXIN 500 MG: 250 CAPSULE ORAL at 19:12

## 2024-05-10 RX ADMIN — TETANUS TOXOID, REDUCED DIPHTHERIA TOXOID AND ACELLULAR PERTUSSIS VACCINE, ADSORBED 0.5 ML: 5; 2.5; 8; 8; 2.5 SUSPENSION INTRAMUSCULAR at 19:10

## 2024-05-10 RX ADMIN — BACITRACIN ZINC 1 SMALL APPLICATION: 500 OINTMENT TOPICAL at 20:06
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
## 2024-05-11 NOTE — ED NOTES
Stiches bandaged with non-adhesive bandage, bacitracin, gauze wrap. Boot placed on foot with instructions. Pt verbalizes understanding.     Mayra Victoria RN  05/10/24 2015

## 2024-05-11 NOTE — DISCHARGE INSTRUCTIONS
Follow-up with your primary care provider come back to the emergency department in 10 days for removal of the sutures in your ankle.  14 sutures were placed.    Come back to the emergency department immediately for new or worsening symptoms including but not limited to fever, redness, pus discharge.    Take the antibiotics as prescribed for the next 5 days.

## 2024-05-14 DIAGNOSIS — I21.02 ACUTE ST ELEVATION MYOCARDIAL INFARCTION (STEMI) INVOLVING LEFT ANTERIOR DESCENDING (LAD) CORONARY ARTERY (HCC): ICD-10-CM

## 2024-05-15 RX ORDER — ASPIRIN 81 MG/1
81 TABLET, CHEWABLE ORAL DAILY
Qty: 90 TABLET | Refills: 1 | Status: SHIPPED | OUTPATIENT
Start: 2024-05-15

## 2024-07-01 DIAGNOSIS — I25.10 CORONARY ARTERY DISEASE INVOLVING NATIVE CORONARY ARTERY OF NATIVE HEART WITHOUT ANGINA PECTORIS: ICD-10-CM

## 2024-07-01 DIAGNOSIS — E11.42 TYPE 2 DIABETES MELLITUS WITH DIABETIC POLYNEUROPATHY, WITHOUT LONG-TERM CURRENT USE OF INSULIN (HCC): ICD-10-CM

## 2024-07-01 RX ORDER — ATORVASTATIN CALCIUM 20 MG/1
20 TABLET, FILM COATED ORAL DAILY
Qty: 30 TABLET | Refills: 0 | Status: SHIPPED | OUTPATIENT
Start: 2024-07-01

## 2024-07-26 ENCOUNTER — NURSE TRIAGE (OUTPATIENT)
Age: 59
End: 2024-07-26
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
## 2024-08-14 ENCOUNTER — HOSPITAL ENCOUNTER (EMERGENCY)
Facility: HOSPITAL | Age: 59
Discharge: HOME | End: 2024-08-14
Attending: EMERGENCY MEDICINE
Payer: COMMERCIAL

## 2024-08-14 ENCOUNTER — APPOINTMENT (EMERGENCY)
Dept: RADIOLOGY | Facility: HOSPITAL | Age: 59
End: 2024-08-14
Payer: COMMERCIAL

## 2024-08-14 VITALS
TEMPERATURE: 97.8 F | OXYGEN SATURATION: 100 % | RESPIRATION RATE: 16 BRPM | SYSTOLIC BLOOD PRESSURE: 144 MMHG | HEART RATE: 74 BPM | DIASTOLIC BLOOD PRESSURE: 80 MMHG

## 2024-08-14 DIAGNOSIS — S62.314A: Primary | ICD-10-CM

## 2024-08-14 PROCEDURE — 29125 APPL SHORT ARM SPLINT STATIC: CPT | Performed by: EMERGENCY MEDICINE

## 2024-08-14 PROCEDURE — 99284 EMERGENCY DEPT VISIT MOD MDM: CPT | Performed by: EMERGENCY MEDICINE

## 2024-08-14 PROCEDURE — 73130 X-RAY EXAM OF HAND: CPT

## 2024-08-14 PROCEDURE — 99283 EMERGENCY DEPT VISIT LOW MDM: CPT
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
## 2024-08-14 NOTE — DISCHARGE INSTRUCTIONS
Follow-up with Dr. Perdomo with hand surgery soon as possible.    Do not get your splint wet.    Unwrap your splint and wrap less tightly if you get numbness in fingers, cold digits, uncontrolled pain.    Come back for new or worsening symptoms.

## 2024-08-20 ENCOUNTER — OFFICE VISIT (OUTPATIENT)
Dept: OBGYN CLINIC | Facility: CLINIC | Age: 59
End: 2024-08-20
Payer: COMMERCIAL

## 2024-08-20 ENCOUNTER — PREP FOR PROCEDURE (OUTPATIENT)
Dept: OBGYN CLINIC | Facility: CLINIC | Age: 59
End: 2024-08-20

## 2024-08-20 VITALS
HEIGHT: 72 IN | BODY MASS INDEX: 28.07 KG/M2 | SYSTOLIC BLOOD PRESSURE: 126 MMHG | HEART RATE: 52 BPM | DIASTOLIC BLOOD PRESSURE: 81 MMHG

## 2024-08-20 DIAGNOSIS — S63.054A DISLOCATION OF CARPOMETACARPAL JOINT OF RIGHT HAND, INITIAL ENCOUNTER: Primary | ICD-10-CM

## 2024-08-20 DIAGNOSIS — Z01.818 PREOP TESTING: ICD-10-CM

## 2024-08-20 PROCEDURE — 99204 OFFICE O/P NEW MOD 45 MIN: CPT | Performed by: STUDENT IN AN ORGANIZED HEALTH CARE EDUCATION/TRAINING PROGRAM

## 2024-08-20 RX ORDER — CHLORHEXIDINE GLUCONATE ORAL RINSE 1.2 MG/ML
15 SOLUTION DENTAL ONCE
Status: CANCELLED | OUTPATIENT
Start: 2024-08-20 | End: 2024-08-20

## 2024-08-20 NOTE — PRE-PROCEDURE INSTRUCTIONS
Pre-Surgery Instructions:   Medication Instructions    atorvastatin (LIPITOR) 20 mg tablet Take day of surgery.    metFORMIN (GLUCOPHAGE) 1000 MG tablet Hold day of surgery.    nitroglycerin (NITROSTAT) 0.4 mg SL tablet PRN   Medication instructions for day surgery reviewed. Please use only a sip of water to take your instructed medications. Avoid all over the counter vitamins, supplements and NSAIDS for one week prior to surgery per anesthesia guidelines. Tylenol is ok to take as needed.     You will receive a call one business day prior to surgery with an arrival time and hospital directions. If your surgery is scheduled on a Monday, the hospital will be calling you on the Friday prior to your surgery. If you have not heard from anyone by 8pm, please call the hospital supervisor through the hospital  at [phone]. ([place] [phone] or [place] [phone]).    Do not eat or drink anything after midnight the night before your surgery, including candy, mints, lifesavers, or chewing gum. Do not drink alcohol 24hrs before your surgery. Try not to smoke at least 24hrs before your surgery.       Follow the pre surgery showering instructions as listed in the “My Surgical Experience Booklet” or otherwise provided by your surgeon's office. Do not use a blade to shave the surgical area 1 week before surgery. It is okay to use a clean electric clippers up to 24 hours before surgery. Do not apply any lotions, creams, including makeup, cologne, deodorant, or perfumes after showering on the day of your surgery. Do not use dry shampoo, hair spray, hair gel, or any type of hair products.     No contact lenses, eye make-up, or artificial eyelashes. Remove nail polish, including gel polish, and any artificial, gel, or acrylic nails if possible. Remove all jewelry including rings and body piercing jewelry.     Wear causal clothing that is easy to take on and off. Consider your type of surgery.    Keep any valuables,  jewelry, piercings at home. Please bring any specially ordered equipment (sling, braces) if indicated.    Arrange for a responsible person to drive you to and from the hospital on the day of your surgery. Please confirm the visitor policy for the day of your procedure when you receive your phone call with an arrival time.     Call the surgeon's office with any new illnesses, exposures, or additional questions prior to surgery.    Please reference your “My Surgical Experience Booklet” for additional information to prepare for your upcoming surgery.
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
## 2024-08-21 ENCOUNTER — OFFICE VISIT (OUTPATIENT)
Dept: CARDIOLOGY CLINIC | Facility: CLINIC | Age: 59
End: 2024-08-21
Payer: COMMERCIAL

## 2024-08-21 VITALS
DIASTOLIC BLOOD PRESSURE: 90 MMHG | HEIGHT: 72 IN | WEIGHT: 196 LBS | BODY MASS INDEX: 26.55 KG/M2 | SYSTOLIC BLOOD PRESSURE: 135 MMHG | HEART RATE: 55 BPM | OXYGEN SATURATION: 99 %

## 2024-08-21 DIAGNOSIS — E78.2 MIXED HYPERLIPIDEMIA: ICD-10-CM

## 2024-08-21 DIAGNOSIS — E11.42 TYPE 2 DIABETES MELLITUS WITH DIABETIC POLYNEUROPATHY, WITHOUT LONG-TERM CURRENT USE OF INSULIN (HCC): ICD-10-CM

## 2024-08-21 DIAGNOSIS — I25.10 CORONARY ARTERY DISEASE INVOLVING NATIVE CORONARY ARTERY OF NATIVE HEART WITHOUT ANGINA PECTORIS: Primary | ICD-10-CM

## 2024-08-21 DIAGNOSIS — I25.10 CORONARY ARTERY DISEASE INVOLVING NATIVE CORONARY ARTERY OF NATIVE HEART WITHOUT ANGINA PECTORIS: ICD-10-CM

## 2024-08-21 DIAGNOSIS — Z95.5 STATUS POST INSERTION OF DRUG ELUTING CORONARY ARTERY STENT: ICD-10-CM

## 2024-08-21 DIAGNOSIS — Z01.818 PRE-OP EXAM: Primary | ICD-10-CM

## 2024-08-21 DIAGNOSIS — R07.9 CHEST PAIN, UNSPECIFIED TYPE: ICD-10-CM

## 2024-08-21 DIAGNOSIS — Z87.891 FORMER SMOKER: ICD-10-CM

## 2024-08-21 DIAGNOSIS — I25.2 HISTORY OF ACUTE ANTERIOR WALL MI: ICD-10-CM

## 2024-08-21 DIAGNOSIS — F12.90 MARIJUANA USE: ICD-10-CM

## 2024-08-21 PROCEDURE — 99214 OFFICE O/P EST MOD 30 MIN: CPT | Performed by: NURSE PRACTITIONER

## 2024-08-21 PROCEDURE — 93000 ELECTROCARDIOGRAM COMPLETE: CPT | Performed by: NURSE PRACTITIONER

## 2024-08-21 RX ORDER — ASPIRIN 81 MG/1
81 TABLET, CHEWABLE ORAL DAILY
Qty: 90 TABLET | Refills: 1 | Status: SHIPPED | OUTPATIENT
Start: 2024-08-21

## 2024-08-21 RX ORDER — METOPROLOL SUCCINATE 25 MG/1
12.5 TABLET, EXTENDED RELEASE ORAL DAILY
Start: 2024-08-21

## 2024-08-21 RX ORDER — METOPROLOL SUCCINATE 25 MG/1
25 TABLET, EXTENDED RELEASE ORAL DAILY
Qty: 30 TABLET | Refills: 3 | Status: SHIPPED | OUTPATIENT
Start: 2024-08-21 | End: 2024-08-21 | Stop reason: SDUPTHER
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
## 2024-08-22 ENCOUNTER — HOSPITAL ENCOUNTER (OUTPATIENT)
Dept: NON INVASIVE DIAGNOSTICS | Facility: CLINIC | Age: 59
Discharge: HOME | End: 2024-08-22
Payer: COMMERCIAL

## 2024-08-22 ENCOUNTER — TELEPHONE (OUTPATIENT)
Age: 59
End: 2024-08-22

## 2024-08-22 ENCOUNTER — HOSPITAL ENCOUNTER (OUTPATIENT)
Dept: NON INVASIVE DIAGNOSTICS | Facility: HOSPITAL | Age: 59
Discharge: HOME | End: 2024-08-22
Payer: COMMERCIAL

## 2024-08-22 DIAGNOSIS — I25.10 CORONARY ARTERY DISEASE INVOLVING NATIVE CORONARY ARTERY OF NATIVE HEART WITHOUT ANGINA PECTORIS: ICD-10-CM

## 2024-08-22 DIAGNOSIS — Z01.818 PRE-OPERATIVE CLEARANCE: ICD-10-CM

## 2024-08-22 DIAGNOSIS — Z95.5 STATUS POST INSERTION OF DRUG ELUTING CORONARY ARTERY STENT: ICD-10-CM

## 2024-08-22 DIAGNOSIS — E78.2 MIXED HYPERLIPIDEMIA: ICD-10-CM

## 2024-08-22 LAB
MAX HR PERCENT: 88 %
MAX HR: 144 BPM
RATE PRESSURE PRODUCT: NORMAL
SL CV STRESS RECOVERY BP: NORMAL MMHG
SL CV STRESS RECOVERY HR: 87 BPM
SL CV STRESS RECOVERY O2 SAT: 97 %
SL CV STRESS STAGE REACHED: 4
STRESS ANGINA INDEX: 0
STRESS BASELINE BP: NORMAL MMHG
STRESS BASELINE HR: 59 BPM
STRESS DUKE TREADMILL SCORE: 10
STRESS O2 SAT REST: 99 %
STRESS PEAK HR: 91 BPM
STRESS POST ESTIMATED WORKLOAD: 13.4 METS
STRESS POST EXERCISE DUR MIN: 10 MIN
STRESS POST EXERCISE DUR SEC: 0 SEC
STRESS POST O2 SAT PEAK: 97 %
STRESS POST PEAK BP: 170 MMHG
STRESS ST DEPRESSION: 0 MM

## 2024-08-22 PROCEDURE — 93017 CV STRESS TEST TRACING ONLY: CPT

## 2024-08-22 NOTE — TELEPHONE ENCOUNTER
Caller: Patient    Doctor: Eugenia    Reason for call: Patient is having stress test today. CT/labs 8/23. Questioned if that everything he needs for sx?    Call back#: [phone]

## 2024-08-23 ENCOUNTER — HOSPITAL ENCOUNTER (OUTPATIENT)
Dept: CT IMAGING | Facility: HOSPITAL | Age: 59
End: 2024-08-23
Payer: COMMERCIAL

## 2024-08-23 ENCOUNTER — APPOINTMENT (OUTPATIENT)
Dept: LAB | Facility: HOSPITAL | Age: 59
End: 2024-08-23
Payer: COMMERCIAL

## 2024-08-23 ENCOUNTER — TELEPHONE (OUTPATIENT)
Dept: OBGYN CLINIC | Facility: CLINIC | Age: 59
End: 2024-08-23

## 2024-08-23 DIAGNOSIS — S63.054A DISLOCATION OF CARPOMETACARPAL JOINT OF RIGHT HAND, INITIAL ENCOUNTER: ICD-10-CM

## 2024-08-23 DIAGNOSIS — Z01.818 PREOP TESTING: ICD-10-CM

## 2024-08-23 LAB
ANION GAP SERPL CALCULATED.3IONS-SCNC: 9 MMOL/L (ref 4–13)
BASOPHILS # BLD MANUAL: 0 THOUSAND/UL (ref 0–0.1)
BASOPHILS NFR MAR MANUAL: 0 % (ref 0–1)
BUN SERPL-MCNC: 25 MG/DL (ref 5–25)
CALCIUM SERPL-MCNC: 9.4 MG/DL (ref 8.4–10.2)
CHEST PAIN STATEMENT: NORMAL
CHLORIDE SERPL-SCNC: 107 MMOL/L (ref 96–108)
CO2 SERPL-SCNC: 25 MMOL/L (ref 21–32)
CREAT SERPL-MCNC: 1.12 MG/DL (ref 0.6–1.3)
EOSINOPHIL # BLD MANUAL: 0.09 THOUSAND/UL (ref 0–0.4)
EOSINOPHIL NFR BLD MANUAL: 1 % (ref 0–6)
ERYTHROCYTE [DISTWIDTH] IN BLOOD BY AUTOMATED COUNT: 13.4 % (ref 11.6–15.1)
GFR SERPL CREATININE-BSD FRML MDRD: 72 ML/MIN/1.73SQ M
GLUCOSE SERPL-MCNC: 92 MG/DL (ref 65–140)
HCT VFR BLD AUTO: 40.8 % (ref 36.5–49.3)
HGB BLD-MCNC: 13.4 G/DL (ref 12–17)
LYMPHOCYTES # BLD AUTO: 3.1 THOUSAND/UL (ref 0.6–4.47)
LYMPHOCYTES # BLD AUTO: 36 % (ref 14–44)
MAX DIASTOLIC BP: 76 MMHG
MAX PREDICTED HEART RATE: 163 BPM
MCH RBC QN AUTO: 29.5 PG (ref 26.8–34.3)
MCHC RBC AUTO-ENTMCNC: 32.8 G/DL (ref 31.4–37.4)
MCV RBC AUTO: 90 FL (ref 82–98)
MONOCYTES # BLD AUTO: 0.43 THOUSAND/UL (ref 0–1.22)
MONOCYTES NFR BLD: 5 % (ref 4–12)
NEUTROPHILS # BLD MANUAL: 5 THOUSAND/UL (ref 1.85–7.62)
NEUTS SEG NFR BLD AUTO: 58 % (ref 43–75)
PLATELET # BLD AUTO: 197 THOUSANDS/UL (ref 149–390)
PLATELET BLD QL SMEAR: ADEQUATE
PMV BLD AUTO: 11 FL (ref 8.9–12.7)
POTASSIUM SERPL-SCNC: 4.1 MMOL/L (ref 3.5–5.3)
PROTOCOL NAME: NORMAL
RBC # BLD AUTO: 4.54 MILLION/UL (ref 3.88–5.62)
RBC MORPH BLD: NORMAL
REASON FOR TERMINATION: NORMAL
SODIUM SERPL-SCNC: 141 MMOL/L (ref 135–147)
STRESS POST EXERCISE DUR MIN: 10 MIN
STRESS POST EXERCISE DUR SEC: 0 SEC
STRESS POST PEAK HR: 144 BPM
STRESS POST PEAK SYSTOLIC BP: 170 MMHG
TARGET HR FORMULA: NORMAL
TEST INDICATION: NORMAL
WBC # BLD AUTO: 8.62 THOUSAND/UL (ref 4.31–10.16)

## 2024-08-23 PROCEDURE — 85007 BL SMEAR W/DIFF WBC COUNT: CPT

## 2024-08-23 PROCEDURE — 73200 CT UPPER EXTREMITY W/O DYE: CPT

## 2024-08-23 PROCEDURE — 36415 COLL VENOUS BLD VENIPUNCTURE: CPT

## 2024-08-23 PROCEDURE — 80048 BASIC METABOLIC PNL TOTAL CA: CPT

## 2024-08-23 PROCEDURE — 85027 COMPLETE CBC AUTOMATED: CPT

## 2024-08-23 NOTE — TELEPHONE ENCOUNTER
PEREZ scheduled for surgery date 8/28/2024 - ok'd per Belgica Martin.  Pickup scheduled for 12:15 pm, I called and spoke with patient, he is aware of the pickup time and stated he will be ready.

## 2024-08-23 NOTE — TELEPHONE ENCOUNTER
Spoke with him, he is going to get his PATs & CT today, it's not far from his house so he's walking. As far Weds he needs a ride to the hospital, which we're arranging, however he said he is trying to figure out a ride home. He said he will call if he can get a ride home! I told him we need to know ASAP

## 2024-08-27 ENCOUNTER — VBI (OUTPATIENT)
Dept: ADMINISTRATIVE | Facility: OTHER | Age: 59
End: 2024-08-27

## 2024-08-27 NOTE — TELEPHONE ENCOUNTER
08/27/24 7:44 AM     Chart reviewed for CRC: Colonoscopy ; nothing is submitted to the patient's insurance at this time.     Isabel Kohli   [facility]

## 2024-08-28 ENCOUNTER — ANESTHESIA EVENT (OUTPATIENT)
Dept: PERIOP | Facility: HOSPITAL | Age: 59
End: 2024-08-28
Payer: COMMERCIAL

## 2024-08-28 ENCOUNTER — APPOINTMENT (OUTPATIENT)
Dept: RADIOLOGY | Facility: HOSPITAL | Age: 59
End: 2024-08-28
Payer: COMMERCIAL

## 2024-08-28 ENCOUNTER — HOSPITAL ENCOUNTER (OUTPATIENT)
Facility: HOSPITAL | Age: 59
Setting detail: OUTPATIENT SURGERY
Discharge: HOME | End: 2024-08-28
Attending: STUDENT IN AN ORGANIZED HEALTH CARE EDUCATION/TRAINING PROGRAM | Admitting: STUDENT IN AN ORGANIZED HEALTH CARE EDUCATION/TRAINING PROGRAM
Payer: COMMERCIAL

## 2024-08-28 ENCOUNTER — ANESTHESIA (OUTPATIENT)
Dept: PERIOP | Facility: HOSPITAL | Age: 59
End: 2024-08-28
Payer: COMMERCIAL

## 2024-08-28 VITALS
HEART RATE: 56 BPM | RESPIRATION RATE: 18 BRPM | SYSTOLIC BLOOD PRESSURE: 125 MMHG | WEIGHT: 220 LBS | TEMPERATURE: 97.5 F | BODY MASS INDEX: 29.84 KG/M2 | DIASTOLIC BLOOD PRESSURE: 67 MMHG | OXYGEN SATURATION: 93 %

## 2024-08-28 DIAGNOSIS — Z87.81 S/P ORIF (OPEN REDUCTION INTERNAL FIXATION) FRACTURE: Primary | ICD-10-CM

## 2024-08-28 DIAGNOSIS — Z98.890 S/P ORIF (OPEN REDUCTION INTERNAL FIXATION) FRACTURE: Primary | ICD-10-CM

## 2024-08-28 LAB
GLUCOSE SERPL-MCNC: 77 MG/DL (ref 65–140)
GLUCOSE SERPL-MCNC: 94 MG/DL (ref 65–140)

## 2024-08-28 PROCEDURE — 25645 OPTX CRPL FX OTH/THN SCPH EA: CPT | Performed by: PHYSICIAN ASSISTANT

## 2024-08-28 PROCEDURE — 73130 X-RAY EXAM OF HAND: CPT

## 2024-08-28 PROCEDURE — NC001 PR NO CHARGE: Performed by: PHYSICIAN ASSISTANT

## 2024-08-28 PROCEDURE — 26686 TREAT HAND DISLOCATION: CPT | Performed by: PHYSICIAN ASSISTANT

## 2024-08-28 PROCEDURE — 82948 REAGENT STRIP/BLOOD GLUCOSE: CPT

## 2024-08-28 PROCEDURE — 26615 TREAT METACARPAL FRACTURE: CPT | Performed by: PHYSICIAN ASSISTANT

## 2024-08-28 PROCEDURE — C1713 ANCHOR/SCREW BN/BN,TIS/BN: HCPCS | Performed by: STUDENT IN AN ORGANIZED HEALTH CARE EDUCATION/TRAINING PROGRAM

## 2024-08-28 PROCEDURE — 26686 TREAT HAND DISLOCATION: CPT | Performed by: STUDENT IN AN ORGANIZED HEALTH CARE EDUCATION/TRAINING PROGRAM

## 2024-08-28 PROCEDURE — 26615 TREAT METACARPAL FRACTURE: CPT | Performed by: STUDENT IN AN ORGANIZED HEALTH CARE EDUCATION/TRAINING PROGRAM

## 2024-08-28 PROCEDURE — 25645 OPTX CRPL FX OTH/THN SCPH EA: CPT | Performed by: STUDENT IN AN ORGANIZED HEALTH CARE EDUCATION/TRAINING PROGRAM

## 2024-08-28 DEVICE — C-WIRE PAK DOUBLE ENDED ORTHOPAEDIC WIRE, SPADE, .062" (1.57 MM)
Type: IMPLANTABLE DEVICE | Site: HAND | Status: FUNCTIONAL
Brand: C-WIRE

## 2024-08-28 RX ORDER — OXYCODONE HYDROCHLORIDE 5 MG/1
5 TABLET ORAL EVERY 6 HOURS PRN
Status: DISCONTINUED | OUTPATIENT
Start: 2024-08-28 | End: 2024-08-28 | Stop reason: HOSPADM

## 2024-08-28 RX ORDER — FENTANYL CITRATE/PF 50 MCG/ML
50 SYRINGE (ML) INJECTION
Status: DISCONTINUED | OUTPATIENT
Start: 2024-08-28 | End: 2024-08-28 | Stop reason: HOSPADM

## 2024-08-28 RX ORDER — PROPOFOL 10 MG/ML
INJECTION, EMULSION INTRAVENOUS CONTINUOUS PRN
Status: DISCONTINUED | OUTPATIENT
Start: 2024-08-28 | End: 2024-08-28

## 2024-08-28 RX ORDER — PROPOFOL 10 MG/ML
INJECTION, EMULSION INTRAVENOUS AS NEEDED
Status: DISCONTINUED | OUTPATIENT
Start: 2024-08-28 | End: 2024-08-28

## 2024-08-28 RX ORDER — HYDROMORPHONE HCL IN WATER/PF 6 MG/30 ML
0.2 PATIENT CONTROLLED ANALGESIA SYRINGE INTRAVENOUS
Status: DISCONTINUED | OUTPATIENT
Start: 2024-08-28 | End: 2024-08-28 | Stop reason: HOSPADM

## 2024-08-28 RX ORDER — ONDANSETRON 2 MG/ML
4 INJECTION INTRAMUSCULAR; INTRAVENOUS ONCE AS NEEDED
Status: DISCONTINUED | OUTPATIENT
Start: 2024-08-28 | End: 2024-08-28 | Stop reason: HOSPADM

## 2024-08-28 RX ORDER — DIPHENHYDRAMINE HYDROCHLORIDE 50 MG/ML
12.5 INJECTION INTRAMUSCULAR; INTRAVENOUS ONCE AS NEEDED
Status: DISCONTINUED | OUTPATIENT
Start: 2024-08-28 | End: 2024-08-28 | Stop reason: HOSPADM

## 2024-08-28 RX ORDER — FENTANYL CITRATE 50 UG/ML
INJECTION, SOLUTION INTRAMUSCULAR; INTRAVENOUS AS NEEDED
Status: DISCONTINUED | OUTPATIENT
Start: 2024-08-28 | End: 2024-08-28

## 2024-08-28 RX ORDER — LIDOCAINE HYDROCHLORIDE 10 MG/ML
INJECTION, SOLUTION EPIDURAL; INFILTRATION; INTRACAUDAL; PERINEURAL AS NEEDED
Status: DISCONTINUED | OUTPATIENT
Start: 2024-08-28 | End: 2024-08-28

## 2024-08-28 RX ORDER — OXYCODONE HYDROCHLORIDE 5 MG/1
5 TABLET ORAL EVERY 6 HOURS PRN
Qty: 15 TABLET | Refills: 0 | Status: SHIPPED | OUTPATIENT
Start: 2024-08-28

## 2024-08-28 RX ORDER — PROMETHAZINE HYDROCHLORIDE 25 MG/ML
12.5 INJECTION, SOLUTION INTRAMUSCULAR; INTRAVENOUS
Status: DISCONTINUED | OUTPATIENT
Start: 2024-08-28 | End: 2024-08-28 | Stop reason: HOSPADM

## 2024-08-28 RX ORDER — CHLORHEXIDINE GLUCONATE ORAL RINSE 1.2 MG/ML
15 SOLUTION DENTAL ONCE
Status: DISCONTINUED | OUTPATIENT
Start: 2024-08-28 | End: 2024-08-28 | Stop reason: HOSPADM

## 2024-08-28 RX ORDER — FENTANYL CITRATE/PF 50 MCG/ML
25 SYRINGE (ML) INJECTION
Status: DISCONTINUED | OUTPATIENT
Start: 2024-08-28 | End: 2024-08-28 | Stop reason: HOSPADM

## 2024-08-28 RX ORDER — HYDROMORPHONE HCL/PF 1 MG/ML
0.5 SYRINGE (ML) INJECTION
Status: DISCONTINUED | OUTPATIENT
Start: 2024-08-28 | End: 2024-08-28 | Stop reason: HOSPADM

## 2024-08-28 RX ORDER — KETAMINE HCL IN NACL, ISO-OSM 100MG/10ML
SYRINGE (ML) INJECTION AS NEEDED
Status: DISCONTINUED | OUTPATIENT
Start: 2024-08-28 | End: 2024-08-28

## 2024-08-28 RX ORDER — GLYCOPYRROLATE 0.2 MG/ML
INJECTION INTRAMUSCULAR; INTRAVENOUS AS NEEDED
Status: DISCONTINUED | OUTPATIENT
Start: 2024-08-28 | End: 2024-08-28

## 2024-08-28 RX ORDER — LIDOCAINE HYDROCHLORIDE 20 MG/ML
INJECTION, SOLUTION EPIDURAL; INFILTRATION; INTRACAUDAL; PERINEURAL AS NEEDED
Status: DISCONTINUED | OUTPATIENT
Start: 2024-08-28 | End: 2024-08-28

## 2024-08-28 RX ORDER — MAGNESIUM HYDROXIDE 1200 MG/15ML
LIQUID ORAL AS NEEDED
Status: DISCONTINUED | OUTPATIENT
Start: 2024-08-28 | End: 2024-08-28 | Stop reason: HOSPADM

## 2024-08-28 RX ORDER — ROPIVACAINE HYDROCHLORIDE 5 MG/ML
INJECTION, SOLUTION EPIDURAL; INFILTRATION; PERINEURAL AS NEEDED
Status: DISCONTINUED | OUTPATIENT
Start: 2024-08-28 | End: 2024-08-28

## 2024-08-28 RX ORDER — ONDANSETRON 2 MG/ML
INJECTION INTRAMUSCULAR; INTRAVENOUS AS NEEDED
Status: DISCONTINUED | OUTPATIENT
Start: 2024-08-28 | End: 2024-08-28

## 2024-08-28 RX ORDER — SODIUM CHLORIDE, SODIUM LACTATE, POTASSIUM CHLORIDE, CALCIUM CHLORIDE 600; 310; 30; 20 MG/100ML; MG/100ML; MG/100ML; MG/100ML
INJECTION, SOLUTION INTRAVENOUS CONTINUOUS PRN
Status: DISCONTINUED | OUTPATIENT
Start: 2024-08-28 | End: 2024-08-28

## 2024-08-28 RX ORDER — MIDAZOLAM HYDROCHLORIDE 2 MG/2ML
INJECTION, SOLUTION INTRAMUSCULAR; INTRAVENOUS AS NEEDED
Status: DISCONTINUED | OUTPATIENT
Start: 2024-08-28 | End: 2024-08-28

## 2024-08-28 RX ORDER — CEFAZOLIN SODIUM 2 G/50ML
2000 SOLUTION INTRAVENOUS ONCE
Status: COMPLETED | OUTPATIENT
Start: 2024-08-28 | End: 2024-08-28

## 2024-08-28 RX ADMIN — Medication 20 MG: at 14:43

## 2024-08-28 RX ADMIN — LIDOCAINE HYDROCHLORIDE 30 MG: 10 INJECTION, SOLUTION EPIDURAL; INFILTRATION; INTRACAUDAL; PERINEURAL at 15:13

## 2024-08-28 RX ADMIN — LIDOCAINE HYDROCHLORIDE 5 MG: 20 INJECTION, SOLUTION EPIDURAL; INFILTRATION; INTRACAUDAL; PERINEURAL at 13:45

## 2024-08-28 RX ADMIN — CEFAZOLIN SODIUM 2000 MG: 2 SOLUTION INTRAVENOUS at 14:33

## 2024-08-28 RX ADMIN — PROPOFOL 80 MCG/KG/MIN: 10 INJECTION, EMULSION INTRAVENOUS at 14:33

## 2024-08-28 RX ADMIN — LIDOCAINE HYDROCHLORIDE 50 MG: 10 INJECTION, SOLUTION EPIDURAL; INFILTRATION; INTRACAUDAL; PERINEURAL at 14:33

## 2024-08-28 RX ADMIN — ONDANSETRON 4 MG: 2 INJECTION INTRAMUSCULAR; INTRAVENOUS at 14:33

## 2024-08-28 RX ADMIN — FENTANYL CITRATE 25 MCG: 50 INJECTION, SOLUTION INTRAMUSCULAR; INTRAVENOUS at 14:35

## 2024-08-28 RX ADMIN — LIDOCAINE HYDROCHLORIDE 5 MG: 20 INJECTION, SOLUTION EPIDURAL; INFILTRATION; INTRACAUDAL; PERINEURAL at 14:33

## 2024-08-28 RX ADMIN — FENTANYL CITRATE 25 MCG: 50 INJECTION, SOLUTION INTRAMUSCULAR; INTRAVENOUS at 14:53

## 2024-08-28 RX ADMIN — SODIUM CHLORIDE, SODIUM LACTATE, POTASSIUM CHLORIDE, CALCIUM CHLORIDE: 600; 310; 30; 20 INJECTION, SOLUTION INTRAVENOUS at 13:44

## 2024-08-28 RX ADMIN — PROPOFOL 40 MG: 10 INJECTION, EMULSION INTRAVENOUS at 14:33

## 2024-08-28 RX ADMIN — GLYCOPYRROLATE 0.2 MCG: 0.2 INJECTION INTRAMUSCULAR; INTRAVENOUS at 14:45

## 2024-08-28 RX ADMIN — ROPIVACAINE HYDROCHLORIDE 15 MG: 5 INJECTION, SOLUTION EPIDURAL; INFILTRATION; PERINEURAL at 13:45

## 2024-08-28 RX ADMIN — FENTANYL CITRATE 25 MCG: 50 INJECTION, SOLUTION INTRAMUSCULAR; INTRAVENOUS at 14:36

## 2024-08-28 RX ADMIN — MIDAZOLAM HYDROCHLORIDE 2 MG: 2 INJECTION, SOLUTION INTRAMUSCULAR; INTRAVENOUS at 14:11

## 2024-08-28 RX ADMIN — FENTANYL CITRATE 25 MCG: 50 INJECTION, SOLUTION INTRAMUSCULAR; INTRAVENOUS at 14:11

## 2024-08-28 NOTE — INTERVAL H&P NOTE
H&P reviewed. After examining the patient I find no changes in the patients condition since the H&P had been written.    Vitals:    08/28/24 1336   BP: 116/59   Pulse: 55   Resp: 20   Temp: (!) 97.4 °F (36.3 °C)   SpO2: 100%

## 2024-08-28 NOTE — ANESTHESIA POSTPROCEDURE EVALUATION
Post-Op Assessment Note    CV Status:  Stable    Pain management: adequate    Multimodal analgesia used between 6 hours prior to anesthesia start to PACU discharge    Mental Status:  Sleepy   Hydration Status:  Stable   PONV Controlled:  None   Airway Patency:  Patent     Post Op Vitals Reviewed: Yes    No anethesia notable event occurred.    Staff: LONA               BP 97/53 (08/28/24 1541)    Temp (!) 97.4 °F (36.3 °C) (08/28/24 1541)    Pulse 76 (08/28/24 1541)   Resp 12 (08/28/24 1541)    SpO2 96 % (08/28/24 1541)

## 2024-08-28 NOTE — ANESTHESIA PROCEDURE NOTES
Peripheral Block    Patient location during procedure: holding area  Start time: 8/28/2024 1:45 PM  Reason for block: primary anesthetic, procedure for pain, at surgeon's request and post-op pain management  Staffing  Performed by: Gelacio Briseno MD  Authorized by: Gelacio Briseno MD    Preanesthetic Checklist  Completed: patient identified, IV checked, site marked, risks and benefits discussed, surgical consent, monitors and equipment checked, pre-op evaluation and timeout performed  Peripheral Block  Patient position: supine  Prep: ChloraPrep  Patient monitoring: frequent blood pressure checks, continuous pulse oximetry and heart rate  Block type: Supraclavicular  Laterality: right  Injection technique: single-shot  Procedures: ultrasound guided, Ultrasound guidance required for the procedure to increase accuracy and safety of medication placement and decrease risk of complications.  Ultrasound permanent image saved    Needle  Needle type: Stimuplex   Needle localization: anatomical landmarks and ultrasound guidance  Assessment  Injection assessment: incremental injection, frequent aspiration, injected with ease, negative aspiration, negative for heart rate change, no paresthesia on injection, no symptoms of intraneural/intravenous injection and needle tip visualized at all times  Paresthesia pain: immediately resolved  Post-procedure:  site cleaned and adhesive bandage applied  patient tolerated the procedure well with no immediate complications  Additional Notes  Single needle pass, needle visualized throughout, minimal resistance on injection, appropriate perineural spread          
ambulatory

## 2024-08-28 NOTE — DISCHARGE INSTR - AVS FIRST PAGE
Post Operative Instructions    You have had surgery on your arm today, please read and follow the information below:  Elevate your hand above your elbow during the next 24-48 hours to help with swelling.  Place your hand and arm over your head with motion at your shoulder three times a day.  Do not apply any cream/ointment/oil to your incisions including antibiotics (I.e.Neosporin)  Do not use peroxide to clean your wound   Do not soak your hands in standing water (dishwater, tubs, Jacuzzi's, pools, etc.) until given permission (typically 2-3 weeks after injury)    Call the office if you notice any:  Increased numbness or tingling of your hand or fingers that is not relieved with elevation.  Increasing pain that is not controlled with medication.  Difficulty chewing, breathing, swallowing.  Chest pains or shortness of breath.  Fever over 101.4 degrees.    Bandage: Do NOT remove bandage until follow-up appointment.    Motion: Move fingers into a fist 5 times a day, DO NOT move any splinted fingers.    Weight bearing status: The operated extremity should be non-weight bearing until further notice.    Ice: Ice for 10 minutes every hour as needed for swelling x 24 hours.    Sling: Please use your sling while your arm is numb from the block. When your arm is FULLY awake again, you no longer need this and may use your sling as needed for comfort. While using the sling, make sure to move your shoulder throughout the day to prevent stiffness here.     Pain medication:   Oxycodone 1 tab every 6 hours AS NEEDED for pain  *   You may take Tylenol (acetaminophen) in addition to your pain medication. This is over the counter. Please follow the instructions on the bottle. BE AWARE - your pain medication (hydrocodone/oxycodone) may already include acetaminophen in it. If it does, you must include this in your daily amount and make sure not take more than 3000 mg per day.   *   You may take an antiinflammatory medication (i.e.  ibuprofen/naproxen) in addition to your pain medication. These are found over the counter, but higher prescription doses are available if needed. Please follow the dosing instructions on the bottle and it is recommended you take these with food. DO NOT take these medications if you are on a blood thinner, have history of gastrointestinal bleeding, chronic kidney disease, or if you have ever been told by a physician not to. You CAN take this with Tylenol (acetaminophen), but should only take ONE antiinflammatory at a time.    Antibiotics:  None     Therapy: No therapy needed at this time.    Follow-up Appointment: 10-14 days.        Please call the office if you have any questions or concerns regarding your post-operative care.

## 2024-08-28 NOTE — ANESTHESIA PREPROCEDURE EVALUATION
Procedure:  CLOSED REDUCTION PERCUTANEOUS PINNING VERSUS OPEN REDUCTION W/ INTERNAL FIXATION (ORIF) RIGHT 4TH/5TH CARPOMETACARPAL DISLOCATIONS AND ALL OTHER INDICATED PROCEDURES (Right: Hand)    Relevant Problems   ANESTHESIA (within normal limits)      CARDIO   (+) Coronary artery disease involving native coronary artery of native heart without angina pectoris   (+) Mixed hyperlipidemia      ENDO   (+) Type 2 diabetes mellitus with diabetic polyneuropathy, without long-term current use of insulin (HCC)        Physical Exam    Airway    Mallampati score: II  TM Distance: >3 FB  Neck ROM: full     Dental       Cardiovascular  Rate: normal    Pulmonary  Pulmonary exam normal     Other Findings  Per pt denies anything remaining that is loose or removeable      Anesthesia Plan  ASA Score- 3     Anesthesia Type- IV sedation with anesthesia with ASA Monitors.         Additional Monitors:     Airway Plan:     Comment: Per patient, appropriately NPO, denies active CP/SOB/wheezing/symptoms related to heartburn/nausea/vomiting  .       Plan Factors-Exercise tolerance (METS): >4 METS.    Chart reviewed.    Patient summary reviewed.    Patient is not a current smoker.              Induction- intravenous.    Postoperative Plan- Plan for postoperative opioid use.         Informed Consent- Anesthetic plan and risks discussed with patient.  I personally reviewed this patient with the CRNA. Discussed and agreed on the Anesthesia Plan with the CRNA..

## 2024-08-28 NOTE — OP NOTE
OPERATIVE REPORT  PATIENT NAME: Jah Siegel    :  1965  MRN: [id number]  Pt Location: [facility] OR ROOM 05    SURGERY DATE: 2024     Surgeons and Role:     * Zack Bello MD - Primary     * Sondra Hammonds PA-C - Assisting    Physician Assistant need: A physician assistant was required during the procedure for retraction, tissue handling, dissection, and suturing. No qualified resident was available.    Pre-Op Diagnosis Codes:   RIGHT FOURTH METACARPAL SHAFT FRACTURE  RIGHT HAMATE BODY FRACTURE  RIGHT FOURTH CARPOMETACARPAL DISLOCATION  RIGHT FIFTH CARPOMETACARPAL DISLOCATION    Post-Op Diagnosis Codes:  RIGHT FOURTH METACARPAL SHAFT FRACTURE  RIGHT HAMATE BODY FRACTURE  RIGHT FOURTH CARPOMETACARPAL DISLOCATION  RIGHT FIFTH CARPOMETACARPAL DISLOCATION    Procedure(s) (LRB):  OPEN REDUCTION INTERNAL FIXATION RIGHT FOURTH METACARPAL SHAFT FRACTURE  OPEN REDUCTION INTERNAL FIXATION RIGHT HAMATE BODY FRACTURE  OPEN REDUCTION INTERNAL FIXATION RIGHT FOURTH CARPOMETACARPAL DISLOCATION  OPEN REDUCTION INTERNAL FIXATION RIGHT FIFTH CARPOMETACARPAL DISLOCATION    Specimen(s):  * No specimens in log *    Estimated Blood Loss:   Minimal    Drains:  None    Implants:  Implant Name Type Inv. Item Serial No.  Lot No. LRB No. Used Action   LOG [id number] - [employer] MODULAR MINI FRAG LCP SYSTEM - 1           LOG [id number] - [employer] MODULAR HAND SYSTEM TITANIUM IMPLANTS AND INSTRUMENTS - 1           C-WIRE BLACK .062 - [id number]  C-WIRE BLACK .062  [employer] [id number] Right 1 Implanted   C-WIRE BLACK .062 - [id number]  C-WIRE BLACK .062  [employer] [id number] Right 1 Implanted   C-WIRE BLACK .062 - [id number]  C-WIRE BLACK .062  [employer] [id number] Right 1 Implanted       Anesthesia Type:   IV Sedation with Anesthesia    Operative Indications:  Patient is a 58 y.o. male evaluated in clinic with Right fourth and fifth carpometacarpal dislocations and fourth metacarpal base and hamate fractures.  Radiographs were reviewed with patient. We discussed treatment options with patient including conservative management and surgical management. Discussed nonoperative management with immobilization.  We discussed operative management with closed reduction percutaneous pinning versus open reduction internal fixation.  Given CMC dislocation on radiographs, we recommended operative management. Patient elected for surgical management.  Informed consent was obtained.     Operative Findings:  Right fourth and fifth carpometacarpal dislocations and fourth metacarpal base and hamate fractures that were treated with open reduction internal fixation.     Complications:   None     Procedure and Technique:  Patient was identified in the preoperative holding area.  Surgical sites were marked with patient participation.  Regional block was performed by anesthesia colleagues.  Patient was taken back to the operating theater.  Anesthesia was induced.  Extremity was prepped and draped in typical fashion.  Formal time-out was performed confirming site, patient, and procedure. All present were in agreement.  Tourniquet was inflated.    Fracture reduction om closed manner was attempted, however, was unsuccessful.  Decision was made to proceed with open reduction internal fixation.  A 3 cm longitudinal incision was made over the fourth and fifth CMC joints.  Full-thickness skin flaps were elevated.  There was a cutaneous nerve noted over the ulnar side of the incision that crossed radially distally.  This nerve was protected during the entire procedure.  The fourth and fifth digital extensor tendons were identified and protected during the entire procedure.  A joker elevator was placed into the dislocation and used to elevate soft tissues for appropriate reduction. Reduction was performed under fluoroscopy.  Reduction was successful with a combination of traction and dorsal to volar applied pressure over the fourth and fifth metacarpal  bases.  Three 0.062 K wires were utilized to hold reduction. First K wire was placed transversely across the fifth, fourth, third and into the second metacarpal base.  Second K wire was placed through the fifth metacarpal base into the hamate and capitate.  Third K wire was placed from the fifth metacarpal base to the fourth metacarpal base and capitate. Final radiographs were performed and confirmed appropriate alignment.  Digital alignment was inspected and there was appropriate rotational and angular alignment.  Periosteum over fourth and fifth CMC joints was closed using 4-0 Vicryl in figure-of-eight fashion.  Deep dermal layer was closed with 4-0 Vicryl.  Running 4-0 Monocryl close the skin.  Exofin glue was applied.    Wounds were dressed with Xeroform, 4x4s, cast padding, ulnar gutter splint, and Ace bandage.  Tourniquet was deflated.  Patient was taken to PACU in stable condition.    Postoperative Plan:  We will see the patient at around the 2-week wanda.  We will leave pins in until 6 weeks pending radiographic healing.      Patient Disposition:  PACU         SIGNATURE: Zack Bello MD  DATE: August 28, 2024  TIME: 3:43 PM

## 2024-08-28 NOTE — PERIOPERATIVE NURSING NOTE
"Patient stated his friend was outside of the hospital ready to take pt home. This RN accompanied patient outside to friend's car upon discharge from [facility], the patient did not in fact have a friend picking him up, it was an [employer] . This RN explained to pt that after anesthesia, no [employer] may be taken and a responsible ride home is required. The patient was previously educated on this matter prior to the surgical procedure and agreed to have a reliable care giver take him home after the procedure. While attempting to reeducate the pt and explaining that it is unsafe to leave the hospital at this time. Patient states, \"Man, fuck this, I'm walking home!\" and proceeds to walk off into the parking lot. This RN attempted to advise patient to stay but patient refused. Hospital supervisor made aware.   "

## 2024-08-29 ENCOUNTER — APPOINTMENT (EMERGENCY)
Dept: RADIOLOGY | Facility: HOSPITAL | Age: 59
End: 2024-08-29
Payer: COMMERCIAL

## 2024-08-29 ENCOUNTER — HOSPITAL ENCOUNTER (EMERGENCY)
Facility: HOSPITAL | Age: 59
Discharge: HOME | End: 2024-08-29
Attending: EMERGENCY MEDICINE
Payer: COMMERCIAL

## 2024-08-29 VITALS
OXYGEN SATURATION: 98 % | SYSTOLIC BLOOD PRESSURE: 128 MMHG | DIASTOLIC BLOOD PRESSURE: 61 MMHG | HEART RATE: 72 BPM | RESPIRATION RATE: 16 BRPM | BODY MASS INDEX: 26.7 KG/M2 | TEMPERATURE: 98.9 F | WEIGHT: 196.87 LBS

## 2024-08-29 DIAGNOSIS — M79.641 RIGHT HAND PAIN: ICD-10-CM

## 2024-08-29 DIAGNOSIS — Z98.890 HISTORY OF OPEN REDUCTION AND INTERNAL FIXATION (ORIF) PROCEDURE: ICD-10-CM

## 2024-08-29 DIAGNOSIS — S69.91XA HAND INJURY, RIGHT, INITIAL ENCOUNTER: Primary | ICD-10-CM

## 2024-08-29 PROCEDURE — 29125 APPL SHORT ARM SPLINT STATIC: CPT | Performed by: EMERGENCY MEDICINE

## 2024-08-29 PROCEDURE — 73130 X-RAY EXAM OF HAND: CPT

## 2024-08-29 PROCEDURE — 99284 EMERGENCY DEPT VISIT MOD MDM: CPT | Performed by: EMERGENCY MEDICINE

## 2024-08-29 PROCEDURE — 99284 EMERGENCY DEPT VISIT MOD MDM: CPT

## 2024-08-29 RX ORDER — MORPHINE SULFATE 4 MG/ML
4 INJECTION, SOLUTION INTRAMUSCULAR; INTRAVENOUS ONCE
Status: DISCONTINUED | OUTPATIENT
Start: 2024-08-29 | End: 2024-08-29
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
## 2024-08-29 NOTE — DISCHARGE INSTRUCTIONS
You were evaluated in the emergency department for: right hand injury. You can access your current and pending results through [employer] [employer]. A radiologist will take a second look at your X-Rays, if you had any, and you will be contacted with any new findings.     - You should follow-up with your primary care provider, as soon as possible, for re-evaluation.  - You have been referred to orthopedics    Please, return to the emergency department if you experience new or worsening symptoms, fever, chest pain, shortness of breath, difficulty breathing, dizziness, abdominal pain, persistent nausea/vomiting, syncope or passing out, blood in your urine or stool, coughing up blood, leg swelling/pain, urinary retention, bowel or bladder incontinence, numbness between your legs.

## 2024-08-30 ENCOUNTER — TELEPHONE (OUTPATIENT)
Dept: OBGYN CLINIC | Facility: CLINIC | Age: 59
End: 2024-08-30

## 2024-08-30 NOTE — ED ATTENDING ATTESTATION
8/29/2024  IFilemon MD, saw and evaluated the patient. I have discussed the patient with the resident/non-physician practitioner and agree with the resident's/non-physician practitioner's findings, Plan of Care, and MDM as documented in the resident's/non-physician practitioner's note, except where noted. All available labs and Radiology studies were reviewed.  I was present for key portions of any procedure(s) performed by the resident/non-physician practitioner and I was immediately available to provide assistance.       At this point I agree with the current assessment done in the Emergency Department.  I have conducted an independent evaluation of this patient a history and physical is as follows:see h and p above     ED Course  ED Course as of 08/30/24 1439   Thu Aug 29, 2024   0113 R HAND XRAY - COMPARED TO PREVIOUS XRAYS- HARDWARE INTACT- NO NEW  FX/DISLOCATION    0118 ER MD NOTE- PT SEEN AND THOROUGHLY EVALUATED BY ER MD- CASE D/W ER RESIDENT --  58 YR MALE S/P R ORIF OF R 4TH/5TH MCP FX/DISLOCATION TODAY -- D/C ED WITH SPLINT  - TONIGHT ACCIDENTLY HIT  BACK OF RIGHT HAND ON STOVE C/O INCREASING PAIN IN AREA- AVSS-  PULSE OX 98 % ON RA- INTERPRETATION IS NORMAL- NO INTERVENTION- RUE- NT AT ELBOW- WRIST- DORSUM OF R HAND -- 3 PC PINS SEEN  WITH SURROUNDING EDEMA/MILD TENDERNESS-- NO SIGNS O F INFECTION - NORMAL RUE DISTAL PULSE-SENSATION/CAP REFILL          Critical Care Time  Procedures

## 2024-09-03 ENCOUNTER — TELEPHONE (OUTPATIENT)
Age: 59
End: 2024-09-03

## 2024-09-05 ENCOUNTER — OFFICE VISIT (OUTPATIENT)
Dept: OBGYN CLINIC | Facility: CLINIC | Age: 59
End: 2024-09-05

## 2024-09-05 ENCOUNTER — APPOINTMENT (OUTPATIENT)
Dept: RADIOLOGY | Facility: AMBULARY SURGERY CENTER | Age: 59
End: 2024-09-05
Attending: STUDENT IN AN ORGANIZED HEALTH CARE EDUCATION/TRAINING PROGRAM
Payer: COMMERCIAL

## 2024-09-05 VITALS — WEIGHT: 196.87 LBS | HEIGHT: 72 IN | BODY MASS INDEX: 26.67 KG/M2

## 2024-09-05 DIAGNOSIS — S69.91XA HAND INJURY, RIGHT, INITIAL ENCOUNTER: ICD-10-CM

## 2024-09-05 DIAGNOSIS — M79.641 RIGHT HAND PAIN: ICD-10-CM

## 2024-09-05 DIAGNOSIS — Z98.890 HISTORY OF OPEN REDUCTION AND INTERNAL FIXATION (ORIF) PROCEDURE: ICD-10-CM

## 2024-09-05 PROCEDURE — 99024 POSTOP FOLLOW-UP VISIT: CPT | Performed by: STUDENT IN AN ORGANIZED HEALTH CARE EDUCATION/TRAINING PROGRAM

## 2024-09-05 PROCEDURE — 73130 X-RAY EXAM OF HAND: CPT

## 2024-09-05 NOTE — PROGRESS NOTES
Assessment/Plan:  Patient ID: 58 y.o. male   Surgery: Open Reduction Internal Fixation Right Fourth Metacarpal Shaft Fracture, Open Reduction Internal Fixation Right Hamate Body Fracture, Open Reduction Internal Fixation Right Fourth Carpometacarpal Dislocation, Open Reduction Internal Fixation Right Fifth Carpometacarpal Dislocation - Right  Date of Surgery: 8/28/2024    XRs reviewed with pt today.   We reviewed the importance of protecting this area and potential risks of infection or hardware failure if we do not.  We placed an order for splint to be made by therapy in hopes that this will fit him better and he'll be more likely to keep this on.  Patient refused to be placed into a splint temporarily until he can get to therapy.   We did discuss limiting use of this hand as well to protect the surgical pins.   Pt states he is being evicted and has to move everything in his house and we urged him to not use this hand for lifting and find help.     Follow Up:  5 weeks    To Do Next Visit:  X-rays of the  right  hand with 10 degree supinated lateral view      CHIEF COMPLAINT:  Chief Complaint   Patient presents with    Right Hand - Post-op         SUBJECTIVE:  Patient is a 58 y.o. year old male who presents for follow up after Open Reduction Internal Fixation Right Fourth Metacarpal Shaft Fracture, Open Reduction Internal Fixation Right Hamate Body Fracture, Open Reduction Internal Fixation Right Fourth Carpometacarpal Dislocation, Open Reduction Internal Fixation Right Fifth Carpometacarpal Dislocation - Right. Today patient states he felt better after he removed his postop splint. He states he did this on the day of the surgery. However, he feels like he keeps hitting his pins further into his hand. He also was seen in the ED the day after surgery after he slammed his hand into a counter. XRs at that time thankfully showed no acute abnormalities.     Occupation: Retired     PHYSICAL EXAMINATION:  General: Well  developed and well nourished, alert and oriented x 3, appears comfortable  Psychiatric: Normal    MUSCULOSKELETAL EXAMINATION:  Incision: Clean, dry, intact  Surgery Site: normal, no evidence of infection   Range of Motion: As expected  Neurovascular status: Neuro intact, good cap refill         STUDIES REVIEWED:  Xrays of the right hand were reviewed and independently interpreted in PACS by Dr. Bello and demonstrate maintained alignment of 4th/5th CMC joints with hardware intact.         PROCEDURES PERFORMED:  Procedures  No Procedures performed today    Scribe Attestation      I,:  Sondra Hammonds PA-C am acting as a scribe while in the presence of the attending physician.:       I,:  Zack Bello MD personally performed the services described in this documentation    as scribed in my presence.:

## 2024-09-05 NOTE — LETTER
September 5, 2024     Patient: Jah Siegel  YOB: 1965  Date of Visit: 9/5/2024      To Whom it May Concern:    Jah Siegel is under my professional care. Jah was seen in my office on 9/5/2024. Jah should refrain from any lifting with the right hand.     If you have any questions or concerns, please don't hesitate to call.         Sincerely,          Zack Bello MD        CC: No Recipients

## 2024-09-11 ENCOUNTER — TELEPHONE (OUTPATIENT)
Age: 59
End: 2024-09-11

## 2024-09-11 DIAGNOSIS — Z78.9 NEEDS ASSISTANCE WITH COMMUNITY RESOURCES: Primary | ICD-10-CM

## 2024-09-11 DIAGNOSIS — Z59.00 HOMELESSNESS: ICD-10-CM

## 2024-09-11 SDOH — ECONOMIC STABILITY - HOUSING INSECURITY: HOMELESSNESS UNSPECIFIED: Z59.00

## 2024-09-11 NOTE — TELEPHONE ENCOUNTER
Pt called to reschedule his physical because Justin  had the wrong address and did not pick him up. (Pt reports that as of the end of this week he will  be homeless and was really looking forward to PCP looking at a possible diabetic ulcer he has developed on his foot before he is put out of his home. Needs justin ride rearranged for his physical on 9/18. Reports he will use the same address on file and just wait outside of the home.

## 2024-09-18 NOTE — TELEPHONE ENCOUNTER
Social work referral  placed to help with resources and homelessness. Advise pt must seek medical care at nearest ER for diabetic ulcer.
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
## 2024-09-19 ENCOUNTER — PATIENT OUTREACH (OUTPATIENT)
Dept: CASE MANAGEMENT | Facility: OTHER | Age: 59
End: 2024-09-19
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
## 2024-09-20 ENCOUNTER — TELEPHONE (OUTPATIENT)
Dept: OBGYN CLINIC | Facility: HOSPITAL | Age: 59
End: 2024-09-20
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
## 2024-09-26 ENCOUNTER — TELEPHONE (OUTPATIENT)
Dept: OBGYN CLINIC | Facility: CLINIC | Age: 59
End: 2024-09-26

## 2024-09-26 NOTE — TELEPHONE ENCOUNTER
----- Message from Omkar FLORES sent at 2024 12:19 PM EDT -----  Patients Name: Jah Siegel  : 1965  Phone Number: [phone]  Appointment Date: 24  Appointment time: 8:30 am   Address: [address] [place] [address], [place]  Drop off Facility/Office Name: Orthopedics  Drop off  Address: [facility]. [address] [place] [address]  Cost Center:   Special notes/directions for   Note for scheduling team

## 2024-09-26 NOTE — TELEPHONE ENCOUNTER
Spoke to Pt. Pt provided a  address of [address] [place]. Told Pt will confirm p/u time for 8:30 am appt.

## 2024-09-26 NOTE — TELEPHONE ENCOUNTER
Caller: Patient     Doctor: Eugenia    Reason for call: Patient states he is homeless and unable to make it to his appointments. Patient would like to discuss the amount of pain he is in     Call back#: [phone]

## 2024-09-27 ENCOUNTER — OFFICE VISIT (OUTPATIENT)
Dept: OBGYN CLINIC | Facility: CLINIC | Age: 59
End: 2024-09-27

## 2024-09-27 ENCOUNTER — HOSPITAL ENCOUNTER (OUTPATIENT)
Dept: RADIOLOGY | Facility: HOSPITAL | Age: 59
End: 2024-09-27
Attending: STUDENT IN AN ORGANIZED HEALTH CARE EDUCATION/TRAINING PROGRAM
Payer: COMMERCIAL

## 2024-09-27 VITALS — WEIGHT: 200 LBS | HEIGHT: 72 IN | BODY MASS INDEX: 27.09 KG/M2

## 2024-09-27 DIAGNOSIS — Z98.890 HISTORY OF OPEN REDUCTION AND INTERNAL FIXATION (ORIF) PROCEDURE: ICD-10-CM

## 2024-09-27 DIAGNOSIS — M79.641 RIGHT HAND PAIN: Primary | ICD-10-CM

## 2024-09-27 DIAGNOSIS — M79.641 RIGHT HAND PAIN: ICD-10-CM

## 2024-09-27 PROCEDURE — 73130 X-RAY EXAM OF HAND: CPT

## 2024-09-27 PROCEDURE — 99024 POSTOP FOLLOW-UP VISIT: CPT | Performed by: STUDENT IN AN ORGANIZED HEALTH CARE EDUCATION/TRAINING PROGRAM

## 2024-09-27 RX ORDER — SULFAMETHOXAZOLE/TRIMETHOPRIM 800-160 MG
1 TABLET ORAL EVERY 12 HOURS SCHEDULED
Qty: 14 TABLET | Refills: 0 | Status: SHIPPED | OUTPATIENT
Start: 2024-09-27 | End: 2024-10-04
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
## 2024-09-27 NOTE — PATIENT INSTRUCTIONS
[facility]!  [employer] [facility] [employer]  [email or web address]  [phone]                      Tendon Glides  Keep finger straight;  support with other hand or a tabletop. Bend tip of finger only, then straighten completely.  Repeat______ times, perform _____ sets. Repeat ____ times a day.    Bend middle knuckle; be sure to keep tip of finger  STRAIGHT.  Then straighten whole finger again.   Repeat______ times, perform _____ sets. Repeat ____ times a day.    (3) Keep Finger (s) next to finger you are performing exercise on straight. Flex finger down to palm as far as you can, then straighten all the way.       Repeat______ times, perform _____ sets. Repeat ____ times a day.        Tendon Glides: Composite    Fingers Straight                          à                Hook Fist                           Fingers Straight                         à                       Fist                           Fingers Straight                        à                    Tabletop                        à           Straight fist (Fingernails visible)                                                                                                                                                                                                    Repeat______ times,  perform _____ sets. Repeat ____ times a day.

## 2024-09-30 ENCOUNTER — TELEPHONE (OUTPATIENT)
Dept: OBGYN CLINIC | Facility: CLINIC | Age: 59
End: 2024-09-30

## 2024-09-30 NOTE — TELEPHONE ENCOUNTER
LMOM for patient to call back regarding LYFT ride for Friday, 10/4/2024 appt with Dr. Bello.  Will he be at the [address] [place] [address] in [place] address or the [facility] address in [place]?  Call back number given.

## 2024-10-01 NOTE — TELEPHONE ENCOUNTER
Caller: Patient    Doctor: Eugenia    Reason for call: Patient provided updated insurance. [address] [facility] [place] [address]    Call back#: [phone]

## 2024-10-02 ENCOUNTER — TELEPHONE (OUTPATIENT)
Dept: OBGYN CLINIC | Facility: CLINIC | Age: 59
End: 2024-10-02

## 2024-10-02 NOTE — TELEPHONE ENCOUNTER
----- Message from Jc KRISHNAMURTHY sent at 10/1/2024  3:09 PM EDT -----  0845 AM 10/04/24 ESTIMATED LYFT/UBER PICKUP      THANK YOU  ----- Message -----  From: Charley Sweeney  Sent: 10/1/2024   3:03 PM EDT  To: Patient [employer]; #    Patients Name: Jah Siegel  : 1965  Phone Number: [phone]  Appointment Date: 10/4/2024  Appointment time: 10;15 am   Address: [address] [place] [address] [place] [address]  Drop off Facility/Office Name: [facility]  Drop off  Address: [address] [place] [address]  [id number]  Cost Center: [phone]  Special notes/directions for   Note for scheduling team  
LMOM to notify pt of 8:45 am SHARITA pickup for Dr. Bello appointment on 10/4/24.  
Warm

## 2024-10-04 ENCOUNTER — OFFICE VISIT (OUTPATIENT)
Dept: OBGYN CLINIC | Facility: CLINIC | Age: 59
End: 2024-10-04

## 2024-10-04 VITALS — BODY MASS INDEX: 27.09 KG/M2 | WEIGHT: 200 LBS | HEIGHT: 72 IN

## 2024-10-04 DIAGNOSIS — S63.054A DISLOCATION OF CARPOMETACARPAL JOINT OF RIGHT HAND, INITIAL ENCOUNTER: ICD-10-CM

## 2024-10-04 DIAGNOSIS — Z98.890 HISTORY OF OPEN REDUCTION AND INTERNAL FIXATION (ORIF) PROCEDURE: Primary | ICD-10-CM

## 2024-10-04 PROCEDURE — 99024 POSTOP FOLLOW-UP VISIT: CPT | Performed by: STUDENT IN AN ORGANIZED HEALTH CARE EDUCATION/TRAINING PROGRAM

## 2024-10-04 NOTE — PROGRESS NOTES
Assessment/Plan:  Patient ID: 58 y.o. male   Surgery: Open Reduction Internal Fixation Right Fourth Metacarpal Shaft Fracture, Open Reduction Internal Fixation Right Hamate Body Fracture, Open Reduction Internal Fixation Right Fourth Carpometacarpal Dislocation, Open Reduction Internal Fixation Right Fifth Carpometacarpal Dislocation - Right  Date of Surgery: 8/28/2024    Prescription for hand therapy was provided. In the patient's after visit summary, a referral to free virtual [facility] was provided. Patient confirmed he has access to the internet.  Patient's wounds are healed without signs of drainage. No antibiotics required at this time.  He may perform activities as tolerated.      Follow Up:  6 weeks    To Do Next Visit:  Re-evaluation of current issue      CHIEF COMPLAINT:  Chief Complaint   Patient presents with    Right Hand - Post-op         SUBJECTIVE:  Patient is a 58 y.o. year old male who presents for follow up 5 weeks s/p Open Reduction Internal Fixation Right Fourth Metacarpal Shaft Fracture, Open Reduction Internal Fixation Right Hamate Body Fracture, Open Reduction Internal Fixation Right Fourth Carpometacarpal Dislocation, Open Reduction Internal Fixation Right Fifth Carpometacarpal Dislocation - Right, DOS 08/28/2024.    Unfortunately, the patient was unable to  his Bactrim prescription. Patient reports his drainage stopped 2 days after evaluation. He is currently homeless living in the [facility]. He reports stiffness into his wrist and thumb that shoots to his elbow.       PHYSICAL EXAMINATION:  General: Well developed and well nourished, alert and oriented x 3, appears comfortable  Psychiatric: Normal    MUSCULOSKELETAL EXAMINATION:  Incision: Clean, dry, intact.  Surgery Site:  No drainage. No erythremia.   Range of Motion: full digit extension. 3 cm small finger to palm distance.  Neurovascular status: Neuro intact, good cap refill  Mild swelling to hand, expected  for postoperative    STUDIES REVIEWED:  No new radiographs      PROCEDURES PERFORMED:  Procedures  No Procedures performed today    Scribe Attestation      I,:  Kati Painter am acting as a scribe while in the presence of the attending physician.:       I,:  Zack Bello MD personally performed the services described in this documentation    as scribed in my presence.:

## 2024-10-04 NOTE — PATIENT INSTRUCTIONS
[facility]!  [facility] [employer] [employer]  [email or web address]  [phone]

## 2024-10-17 ENCOUNTER — TELEPHONE (OUTPATIENT)
Dept: OBGYN CLINIC | Facility: MEDICAL CENTER | Age: 59
End: 2024-10-17

## 2024-10-17 DIAGNOSIS — I25.10 CORONARY ARTERY DISEASE INVOLVING NATIVE CORONARY ARTERY OF NATIVE HEART WITHOUT ANGINA PECTORIS: ICD-10-CM

## 2024-10-17 RX ORDER — ATORVASTATIN CALCIUM 20 MG/1
20 TABLET, FILM COATED ORAL DAILY
Qty: 30 TABLET | Refills: 5 | Status: SHIPPED | OUTPATIENT
Start: 2024-10-17

## 2024-10-17 NOTE — TELEPHONE ENCOUNTER
Caller: MR Siegel     Doctor: Dr Bello     Reason for call: The patient was in to see you on 10/4 and he feels he re-injured his hand by have to move out of his apartment by himself. He is having pain.       Please call to advise. Thank you         Call back#: [phone] Re-

## 2024-10-17 NOTE — TELEPHONE ENCOUNTER
Reason for call:   [x] Refill   [] Prior Auth  [] Other:     Office:   [x] PCP/Provider -   [] Specialty/Provider -     Medication: Lipitor    Dose/Frequency: 20 mg    Quantity: 30    Pharmacy:   [facility] #[id number] - [place] [place] [id number]  Phone: [phone]  Fax: [phone]  PADMINI #: [id number]       Does the patient have enough for 3 days?   [] Yes   [x] No - Send as HP to POD

## 2024-10-17 NOTE — TELEPHONE ENCOUNTER
Called and spoke w/pt and he was evicted from his apartement yesterday and needed to move heavy furniture like dressers, couch, etc from 2 floors down 3 levels and now has pain  and numbness in right pinky and pain in right hand. Denies swelling and no bruising. Incision is there ok. He knows he was not supposed to use it but had not choice as he was told they would throw his belongings away. Pt is currently in [place].  Has not had his insulin in 2 days. Is calling his PCP. Offered to make appt w/Dr Bello but wants his advise. Not looking for pain meds. Hasn't been able to do PT. Reports has high pain tolerance. Please review and advise. Next appt 11/15/24-reviewed w/pt.     8/28/24 OPEN REDUCTION INTERNAL FIXATION RIGHT FOURTH METACARPAL SHAFT FRACTURE, OPEN REDUCTION INTERNAL FIXATION RIGHT HAMATE BODY FRACTURE, OPEN REDUCTION INTERNAL FIXATION RIGHT FOURTH CARPOMETACARPAL DISLOCATION, OPEN REDUCTION INTERNAL FIXATION RIGHT FIFTH CARPOMETACARPAL DISLOCATION (Right: Hand)

## 2024-10-25 ENCOUNTER — HOSPITAL ENCOUNTER (EMERGENCY)
Facility: HOSPITAL | Age: 59
Discharge: HOME | End: 2024-10-26
Attending: EMERGENCY MEDICINE
Payer: COMMERCIAL

## 2024-10-25 ENCOUNTER — APPOINTMENT (EMERGENCY)
Dept: RADIOLOGY | Facility: HOSPITAL | Age: 59
End: 2024-10-25
Payer: COMMERCIAL

## 2024-10-25 DIAGNOSIS — R07.9 CHEST PAIN, UNSPECIFIED TYPE: Primary | ICD-10-CM

## 2024-10-25 LAB
BASOPHILS # BLD AUTO: 0.04 THOUSANDS/ΜL (ref 0–0.1)
BASOPHILS NFR BLD AUTO: 0 % (ref 0–1)
EOSINOPHIL # BLD AUTO: 0.11 THOUSAND/ΜL (ref 0–0.61)
EOSINOPHIL NFR BLD AUTO: 1 % (ref 0–6)
ERYTHROCYTE [DISTWIDTH] IN BLOOD BY AUTOMATED COUNT: 13.8 % (ref 11.6–15.1)
HCT VFR BLD AUTO: 40.7 % (ref 36.5–49.3)
HGB BLD-MCNC: 13.2 G/DL (ref 12–17)
IMM GRANULOCYTES # BLD AUTO: 0.06 THOUSAND/UL (ref 0–0.2)
IMM GRANULOCYTES NFR BLD AUTO: 1 % (ref 0–2)
LYMPHOCYTES # BLD AUTO: 3.5 THOUSANDS/ΜL (ref 0.6–4.47)
LYMPHOCYTES NFR BLD AUTO: 38 % (ref 14–44)
MCH RBC QN AUTO: 29.1 PG (ref 26.8–34.3)
MCHC RBC AUTO-ENTMCNC: 32.4 G/DL (ref 31.4–37.4)
MCV RBC AUTO: 90 FL (ref 82–98)
MONOCYTES # BLD AUTO: 0.63 THOUSAND/ΜL (ref 0.17–1.22)
MONOCYTES NFR BLD AUTO: 7 % (ref 4–12)
NEUTROPHILS # BLD AUTO: 4.92 THOUSANDS/ΜL (ref 1.85–7.62)
NEUTS SEG NFR BLD AUTO: 53 % (ref 43–75)
NRBC BLD AUTO-RTO: 0 /100 WBCS
PLATELET # BLD AUTO: 151 THOUSANDS/UL (ref 149–390)
PMV BLD AUTO: 11.8 FL (ref 8.9–12.7)
RBC # BLD AUTO: 4.54 MILLION/UL (ref 3.88–5.62)
WBC # BLD AUTO: 9.26 THOUSAND/UL (ref 4.31–10.16)

## 2024-10-25 PROCEDURE — 85025 COMPLETE CBC W/AUTO DIFF WBC: CPT | Performed by: EMERGENCY MEDICINE

## 2024-10-25 PROCEDURE — 36415 COLL VENOUS BLD VENIPUNCTURE: CPT | Performed by: EMERGENCY MEDICINE

## 2024-10-25 PROCEDURE — 99285 EMERGENCY DEPT VISIT HI MDM: CPT | Performed by: EMERGENCY MEDICINE

## 2024-10-25 PROCEDURE — 93005 ELECTROCARDIOGRAM TRACING: CPT

## 2024-10-25 PROCEDURE — 99285 EMERGENCY DEPT VISIT HI MDM: CPT

## 2024-10-25 PROCEDURE — 84484 ASSAY OF TROPONIN QUANT: CPT | Performed by: EMERGENCY MEDICINE

## 2024-10-25 PROCEDURE — 80048 BASIC METABOLIC PNL TOTAL CA: CPT | Performed by: EMERGENCY MEDICINE

## 2024-10-25 PROCEDURE — 71045 X-RAY EXAM CHEST 1 VIEW: CPT

## 2024-10-25 RX ORDER — ASPIRIN 81 MG/1
243 TABLET, CHEWABLE ORAL ONCE
Status: COMPLETED | OUTPATIENT
Start: 2024-10-25 | End: 2024-10-25

## 2024-10-25 RX ORDER — ACETAMINOPHEN 325 MG/1
650 TABLET ORAL ONCE
Status: COMPLETED | OUTPATIENT
Start: 2024-10-25 | End: 2024-10-25

## 2024-10-25 RX ADMIN — ACETAMINOPHEN 650 MG: 325 TABLET, FILM COATED ORAL at 23:42

## 2024-10-25 RX ADMIN — ASPIRIN 81 MG 243 MG: 81 TABLET ORAL at 23:42

## 2024-10-26 VITALS
SYSTOLIC BLOOD PRESSURE: 135 MMHG | OXYGEN SATURATION: 97 % | RESPIRATION RATE: 14 BRPM | DIASTOLIC BLOOD PRESSURE: 61 MMHG | TEMPERATURE: 98.5 F | HEART RATE: 61 BPM

## 2024-10-26 LAB
2HR DELTA HS TROPONIN: <-2 NG/L
ANION GAP SERPL CALCULATED.3IONS-SCNC: 7 MMOL/L (ref 4–13)
ATRIAL RATE: 66 BPM
ATRIAL RATE: 68 BPM
BUN SERPL-MCNC: 17 MG/DL (ref 5–25)
CALCIUM SERPL-MCNC: 9.3 MG/DL (ref 8.4–10.2)
CARDIAC TROPONIN I PNL SERPL HS: 4 NG/L
CARDIAC TROPONIN I PNL SERPL HS: <2 NG/L
CHLORIDE SERPL-SCNC: 105 MMOL/L (ref 96–108)
CO2 SERPL-SCNC: 28 MMOL/L (ref 21–32)
CREAT SERPL-MCNC: 1.1 MG/DL (ref 0.6–1.3)
GFR SERPL CREATININE-BSD FRML MDRD: 74 ML/MIN/1.73SQ M
GLUCOSE SERPL-MCNC: 110 MG/DL (ref 65–140)
P AXIS: 72 DEGREES
P AXIS: 74 DEGREES
POTASSIUM SERPL-SCNC: 3.9 MMOL/L (ref 3.5–5.3)
PR INTERVAL: 136 MS
PR INTERVAL: 136 MS
QRS AXIS: 45 DEGREES
QRS AXIS: 47 DEGREES
QRSD INTERVAL: 74 MS
QRSD INTERVAL: 76 MS
QT INTERVAL: 364 MS
QT INTERVAL: 368 MS
QTC INTERVAL: 381 MS
QTC INTERVAL: 391 MS
SODIUM SERPL-SCNC: 140 MMOL/L (ref 135–147)
T WAVE AXIS: 22 DEGREES
T WAVE AXIS: 29 DEGREES
VENTRICULAR RATE: 66 BPM
VENTRICULAR RATE: 68 BPM

## 2024-10-26 PROCEDURE — 84484 ASSAY OF TROPONIN QUANT: CPT | Performed by: EMERGENCY MEDICINE

## 2024-10-26 PROCEDURE — 36415 COLL VENOUS BLD VENIPUNCTURE: CPT | Performed by: EMERGENCY MEDICINE

## 2024-10-26 PROCEDURE — 93010 ELECTROCARDIOGRAM REPORT: CPT | Performed by: INTERNAL MEDICINE
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
## 2024-10-26 NOTE — ED NOTES
Pt states that he is homeless and has been couch surfing for months. Discharge instructions reviewed with patient, pending ride to friends [place] in AM.      Gogo Davis RN  10/26/24 2600

## 2024-11-20 NOTE — TELEPHONE ENCOUNTER
----- Message from Leslie OMER sent at 11/19/2024  5:36 PM EST -----    ----- Message -----  From: Zack Bello MD  Sent: 11/15/2024   2:55 PM EST  To: Leslie Kay    Please call and offer follow-up.

## 2024-11-27 DIAGNOSIS — E11.42 TYPE 2 DIABETES MELLITUS WITH DIABETIC POLYNEUROPATHY, WITHOUT LONG-TERM CURRENT USE OF INSULIN (HCC): ICD-10-CM

## 2024-11-27 NOTE — TELEPHONE ENCOUNTER
[place] and needs Rx asap as he is out. Please determine if appropriate    Reason for call:   [x] Refill   [] Prior Auth  [] Other:     Office:   [x] PCP/Provider - [facility]   [] Specialty/Provider -     Medication:   metFORMIN (GLUCOPHAGE) 1000 MG tablet - TAKE 1 TABLET BY MOUTH TWICE A DAY     Pharmacy:   [facility]/pharmacy #[id number] [place] [address]     Does the patient have enough for 3 days?   [] Yes   [x] No - Send as HP to POD

## 2024-12-12 ENCOUNTER — VBI (OUTPATIENT)
Dept: ADMINISTRATIVE | Facility: OTHER | Age: 59
End: 2024-12-12

## 2024-12-12 NOTE — TELEPHONE ENCOUNTER
12/12/24 11:32 AM     Chart reviewed for Hemoglobin A1c ; nothing is submitted to the patient's insurance at this time.     Isabel Kohli   [facility]

## 2025-03-28 ENCOUNTER — TELEPHONE (OUTPATIENT)
Age: 60
End: 2025-03-28

## 2025-03-28 NOTE — TELEPHONE ENCOUNTER
Pt called, he needs his medical records sent to the federal government for a lawsuit.  Stated they need to go to [place] at [facility].    Explained that patient needs to provider us with a signed medical release form with the contact info where records are going.  Patient unable to get to office to sign.  I sent patient text to download [employer] to sign medical release form electronically.      FYI

## (undated) DEVICE — EXOFIN PRECISION PEN HIGH VISCOSITY TOPICAL SKIN ADHESIVE: Brand: EXOFIN PRECISION PEN, 1G

## (undated) DEVICE — STRETCH BANDAGE: Brand: CURITY

## (undated) DEVICE — ANTIBACTERIAL UNDYED BRAIDED (POLYGLACTIN 910), SYNTHETIC ABSORBABLE SUTURE: Brand: COATED VICRYL

## (undated) DEVICE — SUT CHROMIC 5-0 P-3 18 IN 687G

## (undated) DEVICE — TUBING SUCTION 5MM X 12 FT

## (undated) DEVICE — PADDING CAST 4 IN  COTTON STRL

## (undated) DEVICE — DRAPE C-ARM X-RAY

## (undated) DEVICE — INTENDED FOR TISSUE SEPARATION, AND OTHER PROCEDURES THAT REQUIRE A SHARP SURGICAL BLADE TO PUNCTURE OR CUT.: Brand: BARD-PARKER ® CARBON RIB-BACK BLADES

## (undated) DEVICE — GLOVE INDICATOR PI UNDERGLOVE SZ 7 BLUE

## (undated) DEVICE — SUT VICRYL 2-0 SH 27 IN UNDYED J417H

## (undated) DEVICE — OCCLUSIVE GAUZE STRIP,3% BISMUTH TRIBROMOPHENATE IN PETROLATUM BLEND: Brand: XEROFORM

## (undated) DEVICE — SUT MONOCRYL 4-0 PS-2 27 IN Y426H

## (undated) DEVICE — SPONGE SCRUB 4 PCT CHLORHEXIDINE

## (undated) DEVICE — CUFF TOURNIQUET 18 X 4 IN QUICK CONNECT DISP 1 BLADDER

## (undated) DEVICE — GLOVE SRG BIOGEL ECLIPSE 7

## (undated) DEVICE — ACE WRAP 3 IN STERILE

## (undated) DEVICE — Device

## (undated) DEVICE — GAUZE SPONGES,16 PLY: Brand: CURITY

## (undated) DEVICE — SPLINT 3 X 15 IN XFAST SET PLASTER

## (undated) DEVICE — DISPOSABLE EQUIPMENT COVER: Brand: SMALL TOWEL DRAPE

## (undated) DEVICE — STERILE BETHLEHEM PLASTIC HAND: Brand: CARDINAL HEALTH

## (undated) DEVICE — 3M™ STERI-STRIP™ BLEND TONE SKIN CLOSURES, B1557, TAN, 1/2 IN X 4 IN (12MM X 100MM), 6 STRIPS/ENVELOPE: Brand: 3M™ STERI-STRIP™